# Patient Record
Sex: MALE | Race: WHITE | NOT HISPANIC OR LATINO | Employment: FULL TIME | URBAN - METROPOLITAN AREA
[De-identification: names, ages, dates, MRNs, and addresses within clinical notes are randomized per-mention and may not be internally consistent; named-entity substitution may affect disease eponyms.]

---

## 2017-06-06 ENCOUNTER — TREATMENT (OUTPATIENT)
Dept: PHYSICAL THERAPY | Facility: CLINIC | Age: 27
End: 2017-06-06

## 2017-06-06 DIAGNOSIS — Z02.1 PRE-EMPLOYMENT EXAMINATION: Primary | ICD-10-CM

## 2017-06-06 PROCEDURE — PDS: Performed by: PHYSICAL THERAPIST

## 2018-02-28 ENCOUNTER — LAB REQUISITION (OUTPATIENT)
Dept: LAB | Facility: OTHER | Age: 28
End: 2018-02-28

## 2018-02-28 DIAGNOSIS — Z00.00 ROUTINE GENERAL MEDICAL EXAMINATION AT A HEALTH CARE FACILITY: ICD-10-CM

## 2018-02-28 LAB
ALBUMIN SERPL-MCNC: 4.3 G/DL (ref 3.5–5.2)
ALP SERPL-CCNC: 46 U/L (ref 39–117)
ALT SERPL W P-5'-P-CCNC: 52 U/L (ref 1–41)
AST SERPL-CCNC: 26 U/L (ref 1–40)
BASOPHILS # BLD AUTO: 0.02 10*3/MM3 (ref 0–0.2)
BASOPHILS NFR BLD AUTO: 0.3 % (ref 0–1.5)
BILIRUB CONJ SERPL-MCNC: <0.2 MG/DL (ref 0–0.3)
BILIRUB SERPL-MCNC: 0.5 MG/DL (ref 0.1–1.2)
BILIRUB UR QL STRIP: NEGATIVE
BUN BLD-MCNC: 10 MG/DL (ref 6–20)
CALCIUM SPEC-SCNC: 9.6 MG/DL (ref 8.6–10.5)
CHLORIDE SERPL-SCNC: 102 MMOL/L (ref 98–107)
CHOLEST SERPL-MCNC: 199 MG/DL (ref 0–200)
CLARITY UR: CLEAR
CO2 SERPL-SCNC: 25.9 MMOL/L (ref 22–29)
COLOR UR: YELLOW
CREAT BLD-MCNC: 0.86 MG/DL (ref 0.76–1.27)
DEPRECATED RDW RBC AUTO: 46.3 FL (ref 37–54)
EOSINOPHIL # BLD AUTO: 0.23 10*3/MM3 (ref 0–0.7)
EOSINOPHIL NFR BLD AUTO: 3.3 % (ref 0.3–6.2)
ERYTHROCYTE [DISTWIDTH] IN BLOOD BY AUTOMATED COUNT: 15 % (ref 11.5–14.5)
GFR SERPL CREATININE-BSD FRML MDRD: 107 ML/MIN/1.73
GGT SERPL-CCNC: 22 U/L (ref 8–61)
GLUCOSE BLD-MCNC: 93 MG/DL (ref 65–99)
GLUCOSE UR STRIP-MCNC: NEGATIVE MG/DL
HCT VFR BLD AUTO: 43.3 % (ref 40.4–52.2)
HDLC SERPL-MCNC: 40 MG/DL (ref 40–60)
HGB BLD-MCNC: 14.6 G/DL (ref 13.7–17.6)
HGB UR QL STRIP.AUTO: NEGATIVE
IMM GRANULOCYTES # BLD: 0 10*3/MM3 (ref 0–0.03)
IMM GRANULOCYTES NFR BLD: 0 % (ref 0–0.5)
IRON 24H UR-MRATE: 66 MCG/DL (ref 59–158)
KETONES UR QL STRIP: NEGATIVE
LDH SERPL-CCNC: 229 U/L (ref 135–225)
LDLC SERPL CALC-MCNC: 131 MG/DL (ref 0–100)
LDLC/HDLC SERPL: 3.29 {RATIO}
LEUKOCYTE ESTERASE UR QL STRIP.AUTO: NEGATIVE
LYMPHOCYTES # BLD AUTO: 2.26 10*3/MM3 (ref 0.9–4.8)
LYMPHOCYTES NFR BLD AUTO: 32.4 % (ref 19.6–45.3)
MCH RBC QN AUTO: 28.8 PG (ref 27–32.7)
MCHC RBC AUTO-ENTMCNC: 33.7 G/DL (ref 32.6–36.4)
MCV RBC AUTO: 85.4 FL (ref 79.8–96.2)
MONOCYTES # BLD AUTO: 0.49 10*3/MM3 (ref 0.2–1.2)
MONOCYTES NFR BLD AUTO: 7 % (ref 5–12)
NEUTROPHILS # BLD AUTO: 3.97 10*3/MM3 (ref 1.9–8.1)
NEUTROPHILS NFR BLD AUTO: 57 % (ref 42.7–76)
NITRITE UR QL STRIP: NEGATIVE
PH UR STRIP.AUTO: 6 [PH] (ref 5–8)
PHOSPHATE SERPL-MCNC: 3.6 MG/DL (ref 2.5–4.5)
PLATELET # BLD AUTO: 210 10*3/MM3 (ref 140–500)
PMV BLD AUTO: 12.3 FL (ref 6–12)
POTASSIUM BLD-SCNC: 4.2 MMOL/L (ref 3.5–5.2)
PROT SERPL-MCNC: 7.8 G/DL (ref 6–8.5)
PROT UR QL STRIP: NEGATIVE
RBC # BLD AUTO: 5.07 10*6/MM3 (ref 4.6–6)
SODIUM BLD-SCNC: 140 MMOL/L (ref 136–145)
SP GR UR STRIP: 1.02 (ref 1–1.03)
TRIGL SERPL-MCNC: 138 MG/DL (ref 0–150)
URATE SERPL-MCNC: 9.1 MG/DL (ref 3.4–7)
UROBILINOGEN UR QL STRIP: NORMAL
VLDLC SERPL-MCNC: 27.6 MG/DL (ref 5–40)
WBC NRBC COR # BLD: 6.97 10*3/MM3 (ref 4.5–10.7)

## 2018-02-28 PROCEDURE — 80061 LIPID PANEL: CPT | Performed by: PHYSICAL MEDICINE & REHABILITATION

## 2018-02-28 PROCEDURE — 80053 COMPREHEN METABOLIC PANEL: CPT | Performed by: PHYSICAL MEDICINE & REHABILITATION

## 2018-02-28 PROCEDURE — 86481 TB AG RESPONSE T-CELL SUSP: CPT | Performed by: PHYSICAL MEDICINE & REHABILITATION

## 2018-02-28 PROCEDURE — 81003 URINALYSIS AUTO W/O SCOPE: CPT | Performed by: PHYSICAL MEDICINE & REHABILITATION

## 2018-02-28 PROCEDURE — 85025 COMPLETE CBC W/AUTO DIFF WBC: CPT | Performed by: PHYSICAL MEDICINE & REHABILITATION

## 2018-03-02 LAB
TSPOT INTERPRETATION: NEGATIVE
TSPOT NIL CONTROL: 0
TSPOT PANEL A: 0
TSPOT PANEL B: 0
TSPOT POS CONTROL: 84

## 2018-06-21 ENCOUNTER — LAB REQUISITION (OUTPATIENT)
Dept: LAB | Facility: OTHER | Age: 28
End: 2018-06-21

## 2018-06-21 DIAGNOSIS — Z00.00 ANNUAL PHYSICAL EXAM: ICD-10-CM

## 2018-06-21 LAB
ALBUMIN SERPL-MCNC: 4.4 G/DL (ref 3.5–5.2)
ALP SERPL-CCNC: 46 U/L (ref 39–117)
ALT SERPL W P-5'-P-CCNC: 44 U/L (ref 1–41)
AST SERPL-CCNC: 26 U/L (ref 1–40)
BASOPHILS # BLD AUTO: 0.02 10*3/MM3 (ref 0–0.2)
BASOPHILS NFR BLD AUTO: 0.3 % (ref 0–1.5)
BILIRUB CONJ SERPL-MCNC: <0.2 MG/DL (ref 0–0.3)
BILIRUB SERPL-MCNC: 0.4 MG/DL (ref 0.1–1.2)
BILIRUB UR QL STRIP: NEGATIVE
BUN BLD-MCNC: 15 MG/DL (ref 6–20)
CALCIUM SPEC-SCNC: 10 MG/DL (ref 8.6–10.5)
CHLORIDE SERPL-SCNC: 98 MMOL/L (ref 98–107)
CHOLEST SERPL-MCNC: 179 MG/DL (ref 0–200)
CLARITY UR: CLEAR
CO2 SERPL-SCNC: 26.3 MMOL/L (ref 22–29)
COLOR UR: YELLOW
CREAT BLD-MCNC: 0.89 MG/DL (ref 0.76–1.27)
DEPRECATED RDW RBC AUTO: 48.5 FL (ref 37–54)
EOSINOPHIL # BLD AUTO: 0.19 10*3/MM3 (ref 0–0.7)
EOSINOPHIL NFR BLD AUTO: 2.9 % (ref 0.3–6.2)
ERYTHROCYTE [DISTWIDTH] IN BLOOD BY AUTOMATED COUNT: 15.2 % (ref 11.5–14.5)
GFR SERPL CREATININE-BSD FRML MDRD: 103 ML/MIN/1.73
GGT SERPL-CCNC: 21 U/L (ref 8–61)
GLUCOSE BLD-MCNC: 97 MG/DL (ref 65–99)
GLUCOSE UR STRIP-MCNC: NEGATIVE MG/DL
HBV SURFACE AB SER RIA-ACNC: NORMAL
HCT VFR BLD AUTO: 41.8 % (ref 40.4–52.2)
HDLC SERPL-MCNC: 34 MG/DL (ref 40–60)
HGB BLD-MCNC: 13.7 G/DL (ref 13.7–17.6)
HGB UR QL STRIP.AUTO: NEGATIVE
IMM GRANULOCYTES # BLD: 0.01 10*3/MM3 (ref 0–0.03)
IMM GRANULOCYTES NFR BLD: 0.2 % (ref 0–0.5)
IRON 24H UR-MRATE: 60 MCG/DL (ref 59–158)
KETONES UR QL STRIP: NEGATIVE
LDH SERPL-CCNC: 267 U/L (ref 135–225)
LDLC SERPL CALC-MCNC: 123 MG/DL (ref 0–100)
LDLC/HDLC SERPL: 3.63 {RATIO}
LEUKOCYTE ESTERASE UR QL STRIP.AUTO: NEGATIVE
LYMPHOCYTES # BLD AUTO: 2.11 10*3/MM3 (ref 0.9–4.8)
LYMPHOCYTES NFR BLD AUTO: 31.9 % (ref 19.6–45.3)
MCH RBC QN AUTO: 28.5 PG (ref 27–32.7)
MCHC RBC AUTO-ENTMCNC: 32.8 G/DL (ref 32.6–36.4)
MCV RBC AUTO: 87.1 FL (ref 79.8–96.2)
MONOCYTES # BLD AUTO: 0.65 10*3/MM3 (ref 0.2–1.2)
MONOCYTES NFR BLD AUTO: 9.8 % (ref 5–12)
NEUTROPHILS # BLD AUTO: 3.64 10*3/MM3 (ref 1.9–8.1)
NEUTROPHILS NFR BLD AUTO: 55.1 % (ref 42.7–76)
NITRITE UR QL STRIP: NEGATIVE
PH UR STRIP.AUTO: 5.5 [PH] (ref 5–8)
PHOSPHATE SERPL-MCNC: 3.8 MG/DL (ref 2.5–4.5)
PLATELET # BLD AUTO: 207 10*3/MM3 (ref 140–500)
PMV BLD AUTO: 12.1 FL (ref 6–12)
POTASSIUM BLD-SCNC: 4.3 MMOL/L (ref 3.5–5.2)
PROT SERPL-MCNC: 7.7 G/DL (ref 6–8.5)
PROT UR QL STRIP: NEGATIVE
RBC # BLD AUTO: 4.8 10*6/MM3 (ref 4.6–6)
SODIUM BLD-SCNC: 138 MMOL/L (ref 136–145)
SP GR UR STRIP: 1.03 (ref 1–1.03)
TRIGL SERPL-MCNC: 108 MG/DL (ref 0–150)
URATE SERPL-MCNC: 8.8 MG/DL (ref 3.4–7)
UROBILINOGEN UR QL STRIP: NORMAL
VLDLC SERPL-MCNC: 21.6 MG/DL (ref 5–40)
WBC NRBC COR # BLD: 6.61 10*3/MM3 (ref 4.5–10.7)

## 2018-06-21 PROCEDURE — 85025 COMPLETE CBC W/AUTO DIFF WBC: CPT | Performed by: PHYSICAL MEDICINE & REHABILITATION

## 2018-06-21 PROCEDURE — 86706 HEP B SURFACE ANTIBODY: CPT | Performed by: PHYSICAL MEDICINE & REHABILITATION

## 2018-06-21 PROCEDURE — 80053 COMPREHEN METABOLIC PANEL: CPT | Performed by: PHYSICAL MEDICINE & REHABILITATION

## 2018-06-21 PROCEDURE — 81003 URINALYSIS AUTO W/O SCOPE: CPT | Performed by: PHYSICAL MEDICINE & REHABILITATION

## 2018-06-21 PROCEDURE — 86481 TB AG RESPONSE T-CELL SUSP: CPT | Performed by: PHYSICAL MEDICINE & REHABILITATION

## 2018-06-21 PROCEDURE — 80061 LIPID PANEL: CPT | Performed by: PHYSICAL MEDICINE & REHABILITATION

## 2018-06-23 LAB
TSPOT INTERPRETATION: NEGATIVE
TSPOT NIL CONTROL INTERPRETATION: NORMAL
TSPOT PANEL A: 0
TSPOT PANEL B: 0
TSPOT POS CONTROL INTERPRETATION: NORMAL

## 2019-03-06 ENCOUNTER — TRANSCRIBE ORDERS (OUTPATIENT)
Dept: CARDIOLOGY | Facility: CLINIC | Age: 29
End: 2019-03-06

## 2019-03-06 ENCOUNTER — LAB REQUISITION (OUTPATIENT)
Dept: LAB | Facility: OTHER | Age: 29
End: 2019-03-06

## 2019-03-06 DIAGNOSIS — Z00.00 PHYSICAL EXAM: Primary | ICD-10-CM

## 2019-03-06 DIAGNOSIS — Z00.00 ROUTINE GENERAL MEDICAL EXAMINATION AT A HEALTH CARE FACILITY: ICD-10-CM

## 2019-03-06 LAB
ALBUMIN SERPL-MCNC: 4.3 G/DL (ref 3.5–5.2)
ALP SERPL-CCNC: 43 U/L (ref 39–117)
ALT SERPL W P-5'-P-CCNC: 69 U/L (ref 1–41)
AST SERPL-CCNC: 27 U/L (ref 1–40)
BASOPHILS # BLD AUTO: 0.03 10*3/MM3 (ref 0–0.2)
BASOPHILS NFR BLD AUTO: 0.5 % (ref 0–1.5)
BILIRUB CONJ SERPL-MCNC: <0.2 MG/DL (ref 0–0.3)
BILIRUB SERPL-MCNC: 0.5 MG/DL (ref 0.1–1.2)
BILIRUB UR QL STRIP: NEGATIVE
BUN BLD-MCNC: 13 MG/DL (ref 6–20)
CALCIUM SPEC-SCNC: 9.9 MG/DL (ref 8.6–10.5)
CHLORIDE SERPL-SCNC: 102 MMOL/L (ref 98–107)
CHOLEST SERPL-MCNC: 190 MG/DL (ref 0–200)
CLARITY UR: CLEAR
CO2 SERPL-SCNC: 24.2 MMOL/L (ref 22–29)
COLOR UR: YELLOW
CREAT BLD-MCNC: 0.88 MG/DL (ref 0.76–1.27)
DEPRECATED RDW RBC AUTO: 44.5 FL (ref 37–54)
EOSINOPHIL # BLD AUTO: 0.17 10*3/MM3 (ref 0–0.4)
EOSINOPHIL NFR BLD AUTO: 2.7 % (ref 0.3–6.2)
ERYTHROCYTE [DISTWIDTH] IN BLOOD BY AUTOMATED COUNT: 14.5 % (ref 12.3–15.4)
GFR SERPL CREATININE-BSD FRML MDRD: 103 ML/MIN/1.73
GGT SERPL-CCNC: 26 U/L (ref 8–61)
GLUCOSE BLD-MCNC: 90 MG/DL (ref 65–99)
GLUCOSE UR STRIP-MCNC: NEGATIVE MG/DL
HCT VFR BLD AUTO: 45 % (ref 37.5–51)
HDLC SERPL-MCNC: 37 MG/DL (ref 40–60)
HGB BLD-MCNC: 14.4 G/DL (ref 13–17.7)
HGB UR QL STRIP.AUTO: NEGATIVE
IMM GRANULOCYTES # BLD AUTO: 0.03 10*3/MM3 (ref 0–0.05)
IMM GRANULOCYTES NFR BLD AUTO: 0.5 % (ref 0–0.5)
IRON 24H UR-MRATE: 59 MCG/DL (ref 59–158)
KETONES UR QL STRIP: NEGATIVE
LDH SERPL-CCNC: 245 U/L (ref 135–225)
LDLC SERPL CALC-MCNC: 135 MG/DL (ref 0–100)
LDLC/HDLC SERPL: 3.66 {RATIO}
LEUKOCYTE ESTERASE UR QL STRIP.AUTO: NEGATIVE
LYMPHOCYTES # BLD AUTO: 1.83 10*3/MM3 (ref 0.7–3.1)
LYMPHOCYTES NFR BLD AUTO: 29 % (ref 19.6–45.3)
MCH RBC QN AUTO: 27.3 PG (ref 26.6–33)
MCHC RBC AUTO-ENTMCNC: 32 G/DL (ref 31.5–35.7)
MCV RBC AUTO: 85.2 FL (ref 79–97)
MONOCYTES # BLD AUTO: 0.7 10*3/MM3 (ref 0.1–0.9)
MONOCYTES NFR BLD AUTO: 11.1 % (ref 5–12)
NEUTROPHILS # BLD AUTO: 3.55 10*3/MM3 (ref 1.4–7)
NEUTROPHILS NFR BLD AUTO: 56.2 % (ref 42.7–76)
NITRITE UR QL STRIP: NEGATIVE
NRBC BLD AUTO-RTO: 0 /100 WBC (ref 0–0)
PH UR STRIP.AUTO: 6 [PH] (ref 5–8)
PHOSPHATE SERPL-MCNC: 3.4 MG/DL (ref 2.5–4.5)
PLATELET # BLD AUTO: 194 10*3/MM3 (ref 140–450)
PMV BLD AUTO: 12.2 FL (ref 6–12)
POTASSIUM BLD-SCNC: 4.1 MMOL/L (ref 3.5–5.2)
PROT SERPL-MCNC: 7.1 G/DL (ref 6–8.5)
PROT UR QL STRIP: NEGATIVE
RBC # BLD AUTO: 5.28 10*6/MM3 (ref 4.14–5.8)
SODIUM BLD-SCNC: 139 MMOL/L (ref 136–145)
SP GR UR STRIP: 1.02 (ref 1–1.03)
TRIGL SERPL-MCNC: 88 MG/DL (ref 0–150)
URATE SERPL-MCNC: 9.4 MG/DL (ref 3.4–7)
UROBILINOGEN UR QL STRIP: NORMAL
VLDLC SERPL-MCNC: 17.6 MG/DL (ref 5–40)
WBC NRBC COR # BLD: 6.31 10*3/MM3 (ref 3.4–10.8)

## 2019-03-06 PROCEDURE — 80053 COMPREHEN METABOLIC PANEL: CPT | Performed by: PHYSICAL MEDICINE & REHABILITATION

## 2019-03-06 PROCEDURE — 81003 URINALYSIS AUTO W/O SCOPE: CPT | Performed by: PHYSICAL MEDICINE & REHABILITATION

## 2019-03-06 PROCEDURE — 85025 COMPLETE CBC W/AUTO DIFF WBC: CPT | Performed by: PHYSICAL MEDICINE & REHABILITATION

## 2019-03-06 PROCEDURE — 86481 TB AG RESPONSE T-CELL SUSP: CPT | Performed by: PHYSICAL MEDICINE & REHABILITATION

## 2019-03-06 PROCEDURE — 80061 LIPID PANEL: CPT | Performed by: PHYSICAL MEDICINE & REHABILITATION

## 2019-03-13 ENCOUNTER — HOSPITAL ENCOUNTER (OUTPATIENT)
Dept: CARDIOLOGY | Facility: HOSPITAL | Age: 29
Discharge: HOME | End: 2019-03-13

## 2019-03-13 DIAGNOSIS — Z00.00 PHYSICAL EXAM: ICD-10-CM

## 2019-03-13 LAB
BH CV STRESS BP STAGE 1: NORMAL
BH CV STRESS BP STAGE 2: NORMAL
BH CV STRESS BP STAGE 3: NORMAL
BH CV STRESS DURATION MIN STAGE 1: 3
BH CV STRESS DURATION MIN STAGE 2: 3
BH CV STRESS DURATION MIN STAGE 3: 3
BH CV STRESS DURATION SEC STAGE 1: 0
BH CV STRESS DURATION SEC STAGE 2: 0
BH CV STRESS DURATION SEC STAGE 3: 0
BH CV STRESS GRADE STAGE 1: 10
BH CV STRESS GRADE STAGE 2: 12
BH CV STRESS GRADE STAGE 3: 14
BH CV STRESS HR STAGE 1: 119
BH CV STRESS HR STAGE 2: 158
BH CV STRESS HR STAGE 3: 188
BH CV STRESS METS STAGE 1: 5
BH CV STRESS METS STAGE 2: 7.5
BH CV STRESS METS STAGE 3: 10
BH CV STRESS PROTOCOL 1: NORMAL
BH CV STRESS RECOVERY BP: NORMAL MMHG
BH CV STRESS RECOVERY HR: 98 BPM
BH CV STRESS SPEED STAGE 1: 1.7
BH CV STRESS SPEED STAGE 2: 2.5
BH CV STRESS SPEED STAGE 3: 3.4
BH CV STRESS STAGE 1: 1
BH CV STRESS STAGE 2: 2
BH CV STRESS STAGE 3: 3
MAXIMAL PREDICTED HEART RATE: 192 BPM
PERCENT MAX PREDICTED HR: 97.92 %
STRESS BASELINE BP: NORMAL MMHG
STRESS BASELINE HR: 88 BPM
STRESS PERCENT HR: 115 %
STRESS POST ESTIMATED WORKLOAD: 10 METS
STRESS POST EXERCISE DUR MIN: 9 MIN
STRESS POST EXERCISE DUR SEC: 0 SEC
STRESS POST PEAK BP: NORMAL MMHG
STRESS POST PEAK HR: 188 BPM
STRESS TARGET HR: 163 BPM

## 2019-03-13 PROCEDURE — 93017 CV STRESS TEST TRACING ONLY: CPT

## 2019-03-13 PROCEDURE — 93018 CV STRESS TEST I&R ONLY: CPT | Performed by: INTERNAL MEDICINE

## 2019-03-13 PROCEDURE — 93016 CV STRESS TEST SUPVJ ONLY: CPT | Performed by: INTERNAL MEDICINE

## 2020-02-03 ENCOUNTER — TELEPHONE (OUTPATIENT)
Dept: NEUROSURGERY | Facility: CLINIC | Age: 30
End: 2020-02-03

## 2020-02-03 NOTE — TELEPHONE ENCOUNTER
LM for patient to call back regarding referral for NP appt. This is a work comp injury so we need more information and a written approval from  not just a referral from Dr. Napoles.

## 2020-03-27 ENCOUNTER — LAB REQUISITION (OUTPATIENT)
Dept: LAB | Facility: OTHER | Age: 30
End: 2020-03-27

## 2020-03-27 DIAGNOSIS — Z00.00 ROUTINE GENERAL MEDICAL EXAMINATION AT A HEALTH CARE FACILITY: ICD-10-CM

## 2020-03-27 LAB
ALBUMIN SERPL-MCNC: 4.3 G/DL (ref 3.5–5.2)
ALBUMIN/GLOB SERPL: 1.4 G/DL
ALP SERPL-CCNC: 58 U/L (ref 39–117)
ALT SERPL W P-5'-P-CCNC: 49 U/L (ref 1–41)
AST SERPL-CCNC: 23 U/L (ref 1–40)
BASOPHILS # BLD AUTO: 0.04 10*3/MM3 (ref 0–0.2)
BASOPHILS NFR BLD AUTO: 0.6 % (ref 0–1.5)
BILIRUB CONJ SERPL-MCNC: <0.2 MG/DL (ref 0.2–0.3)
BILIRUB SERPL-MCNC: 0.4 MG/DL (ref 0.2–1.2)
BILIRUB UR QL STRIP: NEGATIVE
BUN BLD-MCNC: 9 MG/DL (ref 6–20)
CALCIUM SPEC-SCNC: 9.4 MG/DL (ref 8.6–10.5)
CHLORIDE SERPL-SCNC: 106 MMOL/L (ref 98–107)
CHOLEST SERPL-MCNC: 184 MG/DL (ref 0–200)
CLARITY UR: CLEAR
CO2 SERPL-SCNC: 24.4 MMOL/L (ref 22–29)
COLOR UR: YELLOW
CREAT BLD-MCNC: 0.89 MG/DL (ref 0.76–1.27)
DEPRECATED RDW RBC AUTO: 43.8 FL (ref 37–54)
EOSINOPHIL # BLD AUTO: 0.25 10*3/MM3 (ref 0–0.4)
EOSINOPHIL NFR BLD AUTO: 3.6 % (ref 0.3–6.2)
ERYTHROCYTE [DISTWIDTH] IN BLOOD BY AUTOMATED COUNT: 14.4 % (ref 12.3–15.4)
GFR SERPL CREATININE-BSD FRML MDRD: 101 ML/MIN/1.73
GGT SERPL-CCNC: 26 U/L (ref 8–61)
GLOBULIN UR ELPH-MCNC: 3 GM/DL
GLUCOSE BLD-MCNC: 96 MG/DL (ref 65–99)
GLUCOSE UR STRIP-MCNC: NEGATIVE MG/DL
HCT VFR BLD AUTO: 42.2 % (ref 37.5–51)
HDLC SERPL-MCNC: 40 MG/DL (ref 40–60)
HGB BLD-MCNC: 14.1 G/DL (ref 13–17.7)
HGB UR QL STRIP.AUTO: NEGATIVE
IMM GRANULOCYTES # BLD AUTO: 0.02 10*3/MM3 (ref 0–0.05)
IMM GRANULOCYTES NFR BLD AUTO: 0.3 % (ref 0–0.5)
IRON 24H UR-MRATE: 70 MCG/DL (ref 59–158)
KETONES UR QL STRIP: NEGATIVE
LDH SERPL-CCNC: 287 U/L (ref 135–225)
LDLC SERPL CALC-MCNC: 127 MG/DL (ref 0–100)
LDLC/HDLC SERPL: 3.19 {RATIO}
LEUKOCYTE ESTERASE UR QL STRIP.AUTO: NEGATIVE
LYMPHOCYTES # BLD AUTO: 2.51 10*3/MM3 (ref 0.7–3.1)
LYMPHOCYTES NFR BLD AUTO: 36 % (ref 19.6–45.3)
MCH RBC QN AUTO: 28.1 PG (ref 26.6–33)
MCHC RBC AUTO-ENTMCNC: 33.4 G/DL (ref 31.5–35.7)
MCV RBC AUTO: 84.1 FL (ref 79–97)
MONOCYTES # BLD AUTO: 0.69 10*3/MM3 (ref 0.1–0.9)
MONOCYTES NFR BLD AUTO: 9.9 % (ref 5–12)
NEUTROPHILS # BLD AUTO: 3.47 10*3/MM3 (ref 1.7–7)
NEUTROPHILS NFR BLD AUTO: 49.6 % (ref 42.7–76)
NITRITE UR QL STRIP: NEGATIVE
NRBC BLD AUTO-RTO: 0 /100 WBC (ref 0–0.2)
PH UR STRIP.AUTO: 5.5 [PH] (ref 5–8)
PHOSPHATE SERPL-MCNC: 3.5 MG/DL (ref 2.5–4.5)
PLATELET # BLD AUTO: 231 10*3/MM3 (ref 140–450)
PMV BLD AUTO: 11.7 FL (ref 6–12)
POTASSIUM BLD-SCNC: 4.6 MMOL/L (ref 3.5–5.2)
PROT SERPL-MCNC: 7.3 G/DL (ref 6–8.5)
PROT UR QL STRIP: NEGATIVE
RBC # BLD AUTO: 5.02 10*6/MM3 (ref 4.14–5.8)
SODIUM BLD-SCNC: 142 MMOL/L (ref 136–145)
SP GR UR STRIP: 1.02 (ref 1–1.03)
TRIGL SERPL-MCNC: 83 MG/DL (ref 0–150)
URATE SERPL-MCNC: 9.6 MG/DL (ref 3.4–7)
UROBILINOGEN UR QL STRIP: NORMAL
VLDLC SERPL-MCNC: 16.6 MG/DL (ref 5–40)
WBC NRBC COR # BLD: 6.98 10*3/MM3 (ref 3.4–10.8)

## 2020-03-27 PROCEDURE — 86481 TB AG RESPONSE T-CELL SUSP: CPT | Performed by: PHYSICAL MEDICINE & REHABILITATION

## 2020-03-27 PROCEDURE — 81003 URINALYSIS AUTO W/O SCOPE: CPT | Performed by: PHYSICAL MEDICINE & REHABILITATION

## 2020-03-27 PROCEDURE — 80061 LIPID PANEL: CPT | Performed by: PHYSICAL MEDICINE & REHABILITATION

## 2020-03-27 PROCEDURE — 85025 COMPLETE CBC W/AUTO DIFF WBC: CPT | Performed by: PHYSICAL MEDICINE & REHABILITATION

## 2020-03-27 PROCEDURE — 80053 COMPREHEN METABOLIC PANEL: CPT | Performed by: PHYSICAL MEDICINE & REHABILITATION

## 2020-07-02 ENCOUNTER — OFFICE VISIT (OUTPATIENT)
Dept: FAMILY MEDICINE CLINIC | Facility: CLINIC | Age: 30
End: 2020-07-02

## 2020-07-02 VITALS
HEIGHT: 75 IN | OXYGEN SATURATION: 96 % | TEMPERATURE: 98.4 F | WEIGHT: 315 LBS | DIASTOLIC BLOOD PRESSURE: 100 MMHG | HEART RATE: 63 BPM | BODY MASS INDEX: 39.17 KG/M2 | SYSTOLIC BLOOD PRESSURE: 148 MMHG

## 2020-07-02 DIAGNOSIS — E66.01 CLASS 3 SEVERE OBESITY DUE TO EXCESS CALORIES WITH SERIOUS COMORBIDITY AND BODY MASS INDEX (BMI) OF 45.0 TO 49.9 IN ADULT (HCC): ICD-10-CM

## 2020-07-02 DIAGNOSIS — Z11.59 NEED FOR HEPATITIS C SCREENING TEST: ICD-10-CM

## 2020-07-02 DIAGNOSIS — I10 ESSENTIAL HYPERTENSION: Primary | ICD-10-CM

## 2020-07-02 PROBLEM — G89.29 CHRONIC FOOT PAIN, LEFT: Status: ACTIVE | Noted: 2017-10-04

## 2020-07-02 PROBLEM — M79.672 CHRONIC FOOT PAIN, LEFT: Status: ACTIVE | Noted: 2017-10-04

## 2020-07-02 PROBLEM — E66.813 CLASS 3 SEVERE OBESITY DUE TO EXCESS CALORIES WITH SERIOUS COMORBIDITY AND BODY MASS INDEX (BMI) OF 45.0 TO 49.9 IN ADULT: Status: ACTIVE | Noted: 2020-07-02

## 2020-07-02 PROBLEM — M67.00 ACHILLES TENDON CONTRACTURE DUE TO NON-NEUROLOGIC CAUSE: Status: ACTIVE | Noted: 2017-10-04

## 2020-07-02 PROCEDURE — 99203 OFFICE O/P NEW LOW 30 MIN: CPT | Performed by: NURSE PRACTITIONER

## 2020-07-02 RX ORDER — LISINOPRIL 10 MG/1
10 TABLET ORAL DAILY
COMMUNITY
End: 2020-09-28

## 2020-07-02 RX ORDER — AMLODIPINE BESYLATE 10 MG/1
10 TABLET ORAL DAILY
Qty: 30 TABLET | Refills: 0 | Status: SHIPPED | OUTPATIENT
Start: 2020-07-02 | End: 2020-08-06

## 2020-07-02 NOTE — PROGRESS NOTES
"Beau Arias is a 29 y.o. male. Patient presents today to establish care and address hypertension.     History of Present Illness   Has been off BP medication for a few months. Last saw Dr. Napoles in Feb. Over a WC back injury. Works with [employer] . 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 On [employer]. Back is better. Still some brief residual tingling and numbness. Lisinopril 10 would keep diastolic in 90s, and systolic in 140s. Never had cough, but had some intimacy issues.   Has tried multiple times to lose weight--has been to weight loss clinic, tried topiramate. Has constant hunger. Lowest 360. Has decreased meal size, cut back cokes, but always hungry. Does snore, has CPAP, has restarted treating in the last week.  The following portions of the patient's history were reviewed and updated as appropriate: allergies, current medications, past family history, past medical history, past social history, past surgical history and problem list.    Review of Systems   Constitutional: Negative for activity change, appetite change, fatigue, unexpected weight gain and unexpected weight loss.   HENT: Negative.    Eyes: Negative.    Respiratory: Negative.  Negative for shortness of breath.    Cardiovascular: Negative.  Negative for chest pain, palpitations and leg swelling.   Gastrointestinal: Negative.    Neurological: Negative for headache.   Hematological: Negative for adenopathy.   Psychiatric/Behavioral: Negative.      /100 (BP Location: Right arm, Patient Position: Sitting, Cuff Size: Adult)   Pulse 63   Temp 98.4 °F (36.9 °C) (Oral)   Ht 190.5 cm (75\")   Wt (!) 175 kg (386 lb)   SpO2 96%   BMI 48.25 kg/m²     Objective   Physical Exam   Constitutional: He is oriented to person, place, and time. He appears well-developed and well-nourished. No distress.   Neck: Normal range of motion. Neck supple. No tracheal deviation present. No thyromegaly present.   Cardiovascular: Normal rate, regular rhythm " and normal heart sounds.   Pulses:       Carotid pulses are 2+ on the right side, and 2+ on the left side.  Pulmonary/Chest: Effort normal and breath sounds normal.   Lymphadenopathy:     He has no cervical adenopathy.   Neurological: He is alert and oriented to person, place, and time.   Skin: Skin is warm and dry.   Psychiatric: He has a normal mood and affect. His behavior is normal. Judgment and thought content normal.   Nursing note and vitals reviewed.    Assessment/Plan   Problems Addressed this Visit        Cardiovascular and Mediastinum    Essential hypertension - Primary    Relevant Medications    lisinopril (PRINIVIL,ZESTRIL) 10 MG tablet    amLODIPine (NORVASC) 10 MG tablet       Other    Class 3 severe obesity due to excess calories with serious comorbidity and body mass index (BMI) of 45.0 to 49.9 in adult (CMS/[facility])    Relevant Orders    TSH      Other Visit Diagnoses     Need for hepatitis C screening test        Relevant Orders    Hepatitis C Antibody        HTN--rotate to amlodipine secondary to sexual side effects goal <135/85  Obesity--recommend consistent carb diet and continued exercise. Consider phentermine if BP better controlled. Is  A  and can check at his work. Would FU 1 month if starts.  Screen for Hep C based on population recommendations  Lipid and glucose screening through work physicals--mild LDL elevation

## 2020-07-03 LAB
HCV AB S/CO SERPL IA: <0.1 S/CO RATIO (ref 0–0.9)
TSH SERPL DL<=0.005 MIU/L-ACNC: 4.93 UIU/ML (ref 0.27–4.2)

## 2020-08-05 ENCOUNTER — TELEPHONE (OUTPATIENT)
Dept: FAMILY MEDICINE CLINIC | Facility: CLINIC | Age: 30
End: 2020-08-05

## 2020-08-05 RX ORDER — HYDROCHLOROTHIAZIDE 25 MG/1
25 TABLET ORAL DAILY
Qty: 30 TABLET | Refills: 0 | Status: SHIPPED | OUTPATIENT
Start: 2020-08-05 | End: 2020-09-21

## 2020-08-05 NOTE — TELEPHONE ENCOUNTER
That is max on amlodipine, would add HCTZ 25 mg daily.#30 0rf--start on off shift day, as urination typically increased for 1-2 days only

## 2020-08-06 RX ORDER — AMLODIPINE BESYLATE 10 MG/1
TABLET ORAL
Qty: 30 TABLET | Refills: 2 | Status: SHIPPED | OUTPATIENT
Start: 2020-08-06 | End: 2020-09-28

## 2020-09-21 RX ORDER — HYDROCHLOROTHIAZIDE 25 MG/1
TABLET ORAL
Qty: 30 TABLET | Refills: 0 | Status: SHIPPED | OUTPATIENT
Start: 2020-09-21 | End: 2020-12-04

## 2020-09-28 ENCOUNTER — OFFICE VISIT (OUTPATIENT)
Dept: FAMILY MEDICINE CLINIC | Facility: CLINIC | Age: 30
End: 2020-09-28

## 2020-09-28 DIAGNOSIS — I10 ESSENTIAL HYPERTENSION: Primary | ICD-10-CM

## 2020-09-28 PROCEDURE — 99442 PR PHYS/QHP TELEPHONE EVALUATION 11-20 MIN: CPT | Performed by: NURSE PRACTITIONER

## 2020-09-28 RX ORDER — METOPROLOL SUCCINATE 50 MG/1
50 TABLET, EXTENDED RELEASE ORAL DAILY
Qty: 30 TABLET | Refills: 3 | Status: SHIPPED | OUTPATIENT
Start: 2020-09-28 | End: 2021-01-05 | Stop reason: SDUPTHER

## 2020-09-28 NOTE — PROGRESS NOTES
Subjective   Roscoe Arias is a 30 y.o. male. FU BP     History of Present Illness   BP still 140/100 and has developed swelling in feet and legs. Hard to get shoes on. Calves do not hurt.   Switched off lisinopril secondary to sexual side effects. HCTZ added. This has not lessened swelling. Has no asthma history, is not a smokier.   The following portions of the patient's history were reviewed and updated as appropriate: allergies, current medications, past family history, past medical history, past social history, past surgical history and problem list.    Review of Systems   Constitutional: Negative for activity change, appetite change, fatigue, unexpected weight gain and unexpected weight loss.   Respiratory: Negative.    Cardiovascular: Positive for leg swelling. Negative for chest pain and palpitations.   Musculoskeletal: Negative.    Hematological: Negative.    Psychiatric/Behavioral: Negative.      There were no vitals taken for this visit.    Objective   Physical Exam  Constitutional:       General: He is not in acute distress.     Appearance: He is well-developed. He is not diaphoretic.   Pulmonary:      Effort: Pulmonary effort is normal.   Musculoskeletal:         General: No tenderness (calves non tender).   Neurological:      Mental Status: He is alert and oriented to person, place, and time.   Psychiatric:         Behavior: Behavior normal.         Thought Content: Thought content normal.         Judgment: Judgment normal.       Assessment/Plan   Problems Addressed this Visit        Cardiovascular and Mediastinum    Essential hypertension - Primary    Relevant Medications    metoprolol succinate XL (Toprol XL) 50 MG 24 hr tablet      Diagnoses       Codes Comments    Essential hypertension    -  Primary ICD-10-CM: I10  ICD-9-CM: 401.9       will continue HCTZ and rotate to metoprolol. FU 1 month if BP not to goal <135/85    You have chosen to receive care through a telephone visit. Do you consent to  use a telephone visit for your medical care today? Yes    11 min EM

## 2020-12-04 RX ORDER — HYDROCHLOROTHIAZIDE 25 MG/1
TABLET ORAL
Qty: 90 TABLET | Refills: 1 | Status: SHIPPED | OUTPATIENT
Start: 2020-12-04 | End: 2021-01-05

## 2020-12-15 ENCOUNTER — LAB REQUISITION (OUTPATIENT)
Dept: LAB | Facility: OTHER | Age: 30
End: 2020-12-15

## 2020-12-15 DIAGNOSIS — Z00.00 ANNUAL PHYSICAL EXAM: ICD-10-CM

## 2020-12-15 LAB
ALBUMIN SERPL-MCNC: 4.1 G/DL (ref 3.5–5.2)
ALBUMIN/GLOB SERPL: 1.6 G/DL
ALP SERPL-CCNC: 59 U/L (ref 39–117)
ALT SERPL W P-5'-P-CCNC: 51 U/L (ref 1–41)
AST SERPL-CCNC: 29 U/L (ref 1–40)
BASOPHILS # BLD AUTO: 0.04 10*3/MM3 (ref 0–0.2)
BASOPHILS NFR BLD AUTO: 0.6 % (ref 0–1.5)
BILIRUB CONJ SERPL-MCNC: <0.2 MG/DL (ref 0–0.3)
BILIRUB SERPL-MCNC: 0.4 MG/DL (ref 0–1.2)
BILIRUB UR QL STRIP: NEGATIVE
BUN SERPL-MCNC: 12 MG/DL (ref 6–20)
CALCIUM SPEC-SCNC: 8.9 MG/DL (ref 8.6–10.5)
CHLORIDE SERPL-SCNC: 105 MMOL/L (ref 98–107)
CHOLEST SERPL-MCNC: 195 MG/DL (ref 0–200)
CLARITY UR: CLEAR
CO2 SERPL-SCNC: 23 MMOL/L (ref 22–29)
COLOR UR: YELLOW
CREAT SERPL-MCNC: 0.84 MG/DL (ref 0.76–1.27)
DEPRECATED RDW RBC AUTO: 45.4 FL (ref 37–54)
EOSINOPHIL # BLD AUTO: 0.24 10*3/MM3 (ref 0–0.4)
EOSINOPHIL NFR BLD AUTO: 3.5 % (ref 0.3–6.2)
ERYTHROCYTE [DISTWIDTH] IN BLOOD BY AUTOMATED COUNT: 15 % (ref 12.3–15.4)
GFR SERPL CREATININE-BSD FRML MDRD: 107 ML/MIN/1.73
GGT SERPL-CCNC: 27 U/L (ref 8–61)
GLOBULIN UR ELPH-MCNC: 2.6 GM/DL
GLUCOSE SERPL-MCNC: 127 MG/DL (ref 65–99)
GLUCOSE UR STRIP-MCNC: NEGATIVE MG/DL
HCT VFR BLD AUTO: 42.4 % (ref 37.5–51)
HDLC SERPL-MCNC: 35 MG/DL (ref 40–60)
HGB BLD-MCNC: 14 G/DL (ref 13–17.7)
HGB UR QL STRIP.AUTO: NEGATIVE
HIV1+2 AB SER QL: NORMAL
IMM GRANULOCYTES # BLD AUTO: 0.02 10*3/MM3 (ref 0–0.05)
IMM GRANULOCYTES NFR BLD AUTO: 0.3 % (ref 0–0.5)
IRON 24H UR-MRATE: 64 MCG/DL (ref 59–158)
KETONES UR QL STRIP: NEGATIVE
LDH SERPL-CCNC: 209 U/L (ref 135–225)
LDLC SERPL CALC-MCNC: 131 MG/DL (ref 0–100)
LDLC/HDLC SERPL: 3.66 {RATIO}
LEUKOCYTE ESTERASE UR QL STRIP.AUTO: NEGATIVE
LYMPHOCYTES # BLD AUTO: 2.38 10*3/MM3 (ref 0.7–3.1)
LYMPHOCYTES NFR BLD AUTO: 34.8 % (ref 19.6–45.3)
MCH RBC QN AUTO: 27.6 PG (ref 26.6–33)
MCHC RBC AUTO-ENTMCNC: 33 G/DL (ref 31.5–35.7)
MCV RBC AUTO: 83.5 FL (ref 79–97)
MEV IGG SER IA-ACNC: NEGATIVE
MONOCYTES # BLD AUTO: 0.61 10*3/MM3 (ref 0.1–0.9)
MONOCYTES NFR BLD AUTO: 8.9 % (ref 5–12)
MUV IGG SER IA-ACNC: POSITIVE
NEUTROPHILS NFR BLD AUTO: 3.55 10*3/MM3 (ref 1.7–7)
NEUTROPHILS NFR BLD AUTO: 51.9 % (ref 42.7–76)
NITRITE UR QL STRIP: NEGATIVE
NRBC BLD AUTO-RTO: 0 /100 WBC (ref 0–0.2)
PH UR STRIP.AUTO: 6.5 [PH] (ref 5–8)
PHOSPHATE SERPL-MCNC: 2.8 MG/DL (ref 2.5–4.5)
PLATELET # BLD AUTO: 199 10*3/MM3 (ref 140–450)
PMV BLD AUTO: 11.6 FL (ref 6–12)
POTASSIUM SERPL-SCNC: 3.9 MMOL/L (ref 3.5–5.2)
PROT SERPL-MCNC: 6.7 G/DL (ref 6–8.5)
PROT UR QL STRIP: NEGATIVE
RBC # BLD AUTO: 5.08 10*6/MM3 (ref 4.14–5.8)
RUBV IGG SERPL IA-ACNC: POSITIVE
SODIUM SERPL-SCNC: 137 MMOL/L (ref 136–145)
SP GR UR STRIP: 1.02 (ref 1–1.03)
TRIGL SERPL-MCNC: 160 MG/DL (ref 0–150)
URATE SERPL-MCNC: 9.1 MG/DL (ref 3.4–7)
UROBILINOGEN UR QL STRIP: NORMAL
VLDLC SERPL-MCNC: 29 MG/DL (ref 5–40)
VZV IGG SER IA-ACNC: POSITIVE
WBC # BLD AUTO: 6.84 10*3/MM3 (ref 3.4–10.8)

## 2020-12-15 PROCEDURE — 86735 MUMPS ANTIBODY: CPT | Performed by: PHYSICAL MEDICINE & REHABILITATION

## 2020-12-15 PROCEDURE — 80053 COMPREHEN METABOLIC PANEL: CPT | Performed by: PHYSICAL MEDICINE & REHABILITATION

## 2020-12-15 PROCEDURE — 85025 COMPLETE CBC W/AUTO DIFF WBC: CPT | Performed by: PHYSICAL MEDICINE & REHABILITATION

## 2020-12-15 PROCEDURE — 86481 TB AG RESPONSE T-CELL SUSP: CPT | Performed by: PHYSICAL MEDICINE & REHABILITATION

## 2020-12-15 PROCEDURE — G0432 EIA HIV-1/HIV-2 SCREEN: HCPCS | Performed by: PHYSICAL MEDICINE & REHABILITATION

## 2020-12-15 PROCEDURE — 86787 VARICELLA-ZOSTER ANTIBODY: CPT | Performed by: PHYSICAL MEDICINE & REHABILITATION

## 2020-12-15 PROCEDURE — 80061 LIPID PANEL: CPT | Performed by: PHYSICAL MEDICINE & REHABILITATION

## 2020-12-15 PROCEDURE — 86762 RUBELLA ANTIBODY: CPT | Performed by: PHYSICAL MEDICINE & REHABILITATION

## 2020-12-15 PROCEDURE — 86765 RUBEOLA ANTIBODY: CPT | Performed by: PHYSICAL MEDICINE & REHABILITATION

## 2020-12-15 PROCEDURE — 81003 URINALYSIS AUTO W/O SCOPE: CPT | Performed by: PHYSICAL MEDICINE & REHABILITATION

## 2020-12-16 LAB
TSPOT INTERPRETATION: NEGATIVE
TSPOT NIL CONTROL INTERPRETATION: NORMAL
TSPOT PANEL A: 1
TSPOT PANEL B: 0
TSPOT POS CONTROL INTERPRETATION: NORMAL

## 2021-01-05 ENCOUNTER — OFFICE VISIT (OUTPATIENT)
Dept: FAMILY MEDICINE CLINIC | Facility: CLINIC | Age: 31
End: 2021-01-05

## 2021-01-05 VITALS
DIASTOLIC BLOOD PRESSURE: 90 MMHG | HEART RATE: 83 BPM | SYSTOLIC BLOOD PRESSURE: 144 MMHG | TEMPERATURE: 96.9 F | HEIGHT: 75 IN | OXYGEN SATURATION: 98 % | BODY MASS INDEX: 48.25 KG/M2

## 2021-01-05 DIAGNOSIS — E66.01 MORBIDLY OBESE (HCC): ICD-10-CM

## 2021-01-05 DIAGNOSIS — G89.29 CHRONIC PAIN OF LEFT KNEE: Primary | ICD-10-CM

## 2021-01-05 DIAGNOSIS — M25.562 CHRONIC PAIN OF LEFT KNEE: Primary | ICD-10-CM

## 2021-01-05 DIAGNOSIS — I10 ESSENTIAL HYPERTENSION: ICD-10-CM

## 2021-01-05 PROCEDURE — 99214 OFFICE O/P EST MOD 30 MIN: CPT | Performed by: NURSE PRACTITIONER

## 2021-01-05 RX ORDER — METOPROLOL SUCCINATE 50 MG/1
50 TABLET, EXTENDED RELEASE ORAL DAILY
Qty: 90 TABLET | Refills: 0 | Status: SHIPPED | OUTPATIENT
Start: 2021-01-05 | End: 2021-04-07 | Stop reason: SDUPTHER

## 2021-01-05 RX ORDER — CHLORTHALIDONE 25 MG/1
25 TABLET ORAL DAILY
Qty: 90 TABLET | Refills: 0 | Status: SHIPPED | OUTPATIENT
Start: 2021-01-05 | End: 2022-04-04 | Stop reason: HOSPADM

## 2021-01-05 NOTE — PROGRESS NOTES
"Subjective   Roscoe Arias is a 30 y.o. male who presents for a follow up on hypertension.     History of Present Illness   Blood pressure 140-150/100 at the station as well. Discussed not quite to goal. No symptoms  Gout flare up yesterday--1st in 7-8 months--taking copious water for flares  The following portions of the patient's history were reviewed and updated as appropriate: allergies, current medications, past family history, past medical history, past social history, past surgical history and problem list.    Review of Systems   Constitutional: Positive for appetite change, fatigue and unexpected weight change. Negative for activity change and fever.   HENT: Negative.    Eyes: Negative.  Negative for visual disturbance.   Respiratory: Negative.    Cardiovascular: Negative.  Negative for chest pain.   Gastrointestinal: Negative.  Negative for constipation and diarrhea.   Endocrine: Negative.    Genitourinary: Negative for difficulty urinating and frequency.   Musculoskeletal: Negative.    Skin: Negative.    Neurological: Negative for weakness and headaches.   Hematological: Negative.    Psychiatric/Behavioral: Negative.  Negative for dysphoric mood.     /90   Pulse 83   Temp 96.9 °F (36.1 °C) (Infrared)   Ht 190.5 cm (75\")   SpO2 98%   BMI 48.25 kg/m²     Objective   Physical Exam  Vitals signs and nursing note reviewed.   Constitutional:       Appearance: He is well-developed. He is not diaphoretic.   HENT:      Head: Normocephalic and atraumatic.   Neck:      Musculoskeletal: Normal range of motion and neck supple.      Thyroid: No thyromegaly.   Cardiovascular:      Rate and Rhythm: Normal rate and regular rhythm.      Pulses:           Carotid pulses are 2+ on the right side and 2+ on the left side.     Heart sounds: Normal heart sounds.   Pulmonary:      Effort: Pulmonary effort is normal.      Breath sounds: Normal breath sounds.   Lymphadenopathy:      Cervical: No cervical adenopathy. "   Psychiatric:         Behavior: Behavior normal.         Thought Content: Thought content normal.         Judgment: Judgment normal.       Assessment/Plan   Problems Addressed this Visit        Cardiac and Vasculature    Essential hypertension    Relevant Medications    chlorthalidone (HYGROTON) 25 MG tablet    metoprolol succinate XL (Toprol XL) 50 MG 24 hr tablet      Other Visit Diagnoses     Chronic pain of left knee    -  Primary    Morbidly obese (CMS/[facility])          Diagnoses       Codes Comments    Chronic pain of left knee    -  Primary ICD-10-CM: M25.562, G89.29  ICD-9-CM: 719.46, 338.29     Essential hypertension     ICD-10-CM: I10  ICD-9-CM: 401.9     Morbidly obese (CMS/[facility])     ICD-10-CM: E66.01  ICD-9-CM: 278.01         Knee pain--extensive crepitus--not overly tender. Would hold additional treatment for now and focus on alignment  HTN--rotate HCTZ to more potent chlorthalidone. Monitor for number of gout flares  Gout--recent uric acid 9.1 at physical--has not formally treated and voices if gets off feet, not overly bothersome  Obese--has gained weight during pandemic. If not reversing trend consider sleep study

## 2021-03-18 RX ORDER — METHYLPREDNISOLONE 4 MG/1
TABLET ORAL
Qty: 21 TABLET | Refills: 0 | Status: SHIPPED | OUTPATIENT
Start: 2021-03-18 | End: 2021-04-07

## 2021-04-07 ENCOUNTER — OFFICE VISIT (OUTPATIENT)
Dept: FAMILY MEDICINE CLINIC | Facility: CLINIC | Age: 31
End: 2021-04-07

## 2021-04-07 VITALS
HEIGHT: 75 IN | SYSTOLIC BLOOD PRESSURE: 138 MMHG | OXYGEN SATURATION: 98 % | TEMPERATURE: 97.5 F | HEART RATE: 74 BPM | BODY MASS INDEX: 39.17 KG/M2 | DIASTOLIC BLOOD PRESSURE: 88 MMHG | WEIGHT: 315 LBS

## 2021-04-07 DIAGNOSIS — I10 ESSENTIAL HYPERTENSION: ICD-10-CM

## 2021-04-07 DIAGNOSIS — Z23 IMMUNIZATION DUE: ICD-10-CM

## 2021-04-07 DIAGNOSIS — E66.01 MORBIDLY OBESE (HCC): Primary | ICD-10-CM

## 2021-04-07 DIAGNOSIS — K30 INDIGESTION: ICD-10-CM

## 2021-04-07 PROCEDURE — 90471 IMMUNIZATION ADMIN: CPT | Performed by: NURSE PRACTITIONER

## 2021-04-07 PROCEDURE — 90715 TDAP VACCINE 7 YRS/> IM: CPT | Performed by: NURSE PRACTITIONER

## 2021-04-07 PROCEDURE — 99214 OFFICE O/P EST MOD 30 MIN: CPT | Performed by: NURSE PRACTITIONER

## 2021-04-07 RX ORDER — METHYLPREDNISOLONE 4 MG/1
TABLET ORAL
Qty: 21 TABLET | Refills: 0 | Status: SHIPPED | OUTPATIENT
Start: 2021-04-07 | End: 2021-09-29 | Stop reason: SDUPTHER

## 2021-04-07 RX ORDER — TOPIRAMATE 50 MG/1
50 CAPSULE, EXTENDED RELEASE ORAL NIGHTLY
Qty: 30 CAPSULE | Refills: 5 | Status: SHIPPED | OUTPATIENT
Start: 2021-04-07 | End: 2021-12-28

## 2021-04-07 RX ORDER — FAMOTIDINE 20 MG/1
20 TABLET, FILM COATED ORAL 2 TIMES DAILY
Qty: 90 TABLET | Refills: 0 | Status: SHIPPED | OUTPATIENT
Start: 2021-04-07

## 2021-04-07 RX ORDER — METOPROLOL SUCCINATE 100 MG/1
100 TABLET, EXTENDED RELEASE ORAL DAILY
Qty: 90 TABLET | Refills: 0 | Status: SHIPPED | OUTPATIENT
Start: 2021-04-07
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
## 2021-04-16 ENCOUNTER — BULK ORDERING (OUTPATIENT)
Dept: CASE MANAGEMENT | Facility: OTHER | Age: 31
End: 2021-04-16

## 2021-04-16 DIAGNOSIS — Z23 IMMUNIZATION DUE: ICD-10-CM

## 2021-06-01 PROBLEM — M10.9 GOUT: Status: ACTIVE | Noted: 2021-06-01

## 2021-06-01 PROBLEM — G47.30 SLEEP APNEA: Status: ACTIVE | Noted: 2021-06-01

## 2021-09-29 RX ORDER — METHYLPREDNISOLONE 4 MG/1
TABLET ORAL
Qty: 21 TABLET | Refills: 0 | Status: SHIPPED | OUTPATIENT
Start: 2021-09-29 | End: 2021-12-28

## 2021-09-29 NOTE — TELEPHONE ENCOUNTER
Gout flare up in right foot that started 5 days ago. Pain was severe enough this morning he had to leave work.    skin suture

## 2021-09-29 NOTE — TELEPHONE ENCOUNTER
Caller: Roscoe Arias    Relationship: Self      Medication requested (name and dosage): methylPREDNISolone (Medrol) 4 MG dose pack    Pharmacy where request should be sent: [facility] [address] [address] [place] [address] - [phone]  - [phone]        Additional details provided by patient: PATIENT CALLING DOES NOT HAVE ANY ON HAND. PLEASE REFILL ON THIS PRESCRIPTION.    Best call back number: [phone] (H)    Does the patient have less than a 3 day supply:  [x] Yes  [] No    Anahi Myers Rep   09/29/21 08:54 EDT

## 2021-12-28 ENCOUNTER — OFFICE VISIT (OUTPATIENT)
Dept: FAMILY MEDICINE CLINIC | Facility: CLINIC | Age: 31
End: 2021-12-28

## 2021-12-28 VITALS
BODY MASS INDEX: 39.17 KG/M2 | DIASTOLIC BLOOD PRESSURE: 88 MMHG | HEART RATE: 71 BPM | SYSTOLIC BLOOD PRESSURE: 138 MMHG | WEIGHT: 315 LBS | TEMPERATURE: 97.1 F | OXYGEN SATURATION: 98 % | HEIGHT: 75 IN

## 2021-12-28 DIAGNOSIS — E66.01 MORBIDLY OBESE: Primary | ICD-10-CM

## 2021-12-28 DIAGNOSIS — G47.33 OBSTRUCTIVE SLEEP APNEA SYNDROME: ICD-10-CM

## 2021-12-28 DIAGNOSIS — Z90.3 S/P GASTRIC SLEEVE PROCEDURE: ICD-10-CM

## 2021-12-28 PROCEDURE — 99214 OFFICE O/P EST MOD 30 MIN: CPT | Performed by: NURSE PRACTITIONER

## 2021-12-30 LAB
25(OH)D3+25(OH)D2 SERPL-MCNC: 11.6 NG/ML (ref 30–100)
REQUEST PROBLEM: NORMAL
VIT B1 BLD-SCNC: NORMAL NMOL/L
VIT B12 SERPL-MCNC: 412 PG/ML (ref 232–1245)

## 2022-01-03 ENCOUNTER — CLINICAL SUPPORT (OUTPATIENT)
Dept: FAMILY MEDICINE CLINIC | Facility: CLINIC | Age: 32
End: 2022-01-03

## 2022-01-03 DIAGNOSIS — R05.9 COUGH: Primary | ICD-10-CM

## 2022-01-05 LAB
LABCORP SARS-COV-2, NAA 2 DAY TAT: NORMAL
SARS-COV-2 RNA RESP QL NAA+PROBE: DETECTED

## 2022-01-13 ENCOUNTER — TELEPHONE (OUTPATIENT)
Dept: FAMILY MEDICINE CLINIC | Facility: CLINIC | Age: 32
End: 2022-01-13

## 2022-01-13 NOTE — TELEPHONE ENCOUNTER
PATIENT STATES HE IS REQUESTING A RX BE SENT THE [facility] ON [address] FOR A B12 SHOT AND VITAMIN D INJECTION. PLEASE ADVISE THANK YOU!

## 2022-01-13 NOTE — TELEPHONE ENCOUNTER
The pharmacy wants him to get a script for the vit d 5000iu instead of getting OTC. He was also under the impression that he should take monthly b12 shots, but the note I see says daily oral b12. Please clarify.

## 2022-04-03 ENCOUNTER — APPOINTMENT (OUTPATIENT)
Dept: CT IMAGING | Facility: HOSPITAL | Age: 32
End: 2022-04-03

## 2022-04-03 ENCOUNTER — ANESTHESIA EVENT (OUTPATIENT)
Dept: PERIOP | Facility: HOSPITAL | Age: 32
End: 2022-04-03

## 2022-04-03 ENCOUNTER — APPOINTMENT (OUTPATIENT)
Dept: ULTRASOUND IMAGING | Facility: HOSPITAL | Age: 32
End: 2022-04-03

## 2022-04-03 ENCOUNTER — APPOINTMENT (OUTPATIENT)
Dept: GENERAL RADIOLOGY | Facility: HOSPITAL | Age: 32
End: 2022-04-03

## 2022-04-03 ENCOUNTER — ANESTHESIA (OUTPATIENT)
Dept: PERIOP | Facility: HOSPITAL | Age: 32
End: 2022-04-03

## 2022-04-03 ENCOUNTER — HOSPITAL ENCOUNTER (OUTPATIENT)
Facility: HOSPITAL | Age: 32
Discharge: HOME | End: 2022-04-04
Attending: EMERGENCY MEDICINE | Admitting: INTERNAL MEDICINE

## 2022-04-03 DIAGNOSIS — K80.43 CHOLEDOCHOLITHIASIS WITH ACUTE CHOLECYSTITIS WITH OBSTRUCTION: ICD-10-CM

## 2022-04-03 DIAGNOSIS — K81.9 CHOLECYSTITIS: ICD-10-CM

## 2022-04-03 DIAGNOSIS — K80.00 ACUTE CALCULOUS CHOLECYSTITIS: Primary | ICD-10-CM

## 2022-04-03 LAB
ALBUMIN SERPL-MCNC: 4.5 G/DL (ref 3.5–5.2)
ALBUMIN/GLOB SERPL: 1.5 G/DL
ALP SERPL-CCNC: 87 U/L (ref 39–117)
ALT SERPL W P-5'-P-CCNC: 106 U/L (ref 1–41)
ANION GAP SERPL CALCULATED.3IONS-SCNC: 12.9 MMOL/L (ref 5–15)
AST SERPL-CCNC: 164 U/L (ref 1–40)
BACTERIA UR QL AUTO: NORMAL /HPF
BASOPHILS # BLD AUTO: 0.06 10*3/MM3 (ref 0–0.2)
BASOPHILS NFR BLD AUTO: 0.8 % (ref 0–1.5)
BILIRUB SERPL-MCNC: 1.7 MG/DL (ref 0–1.2)
BILIRUB UR QL STRIP: NEGATIVE
BUN SERPL-MCNC: 6 MG/DL (ref 6–20)
BUN/CREAT SERPL: 7.1 (ref 7–25)
CALCIUM SPEC-SCNC: 9.9 MG/DL (ref 8.6–10.5)
CHLORIDE SERPL-SCNC: 102 MMOL/L (ref 98–107)
CLARITY UR: CLEAR
CO2 SERPL-SCNC: 26.1 MMOL/L (ref 22–29)
COLOR UR: ABNORMAL
CREAT SERPL-MCNC: 0.85 MG/DL (ref 0.76–1.27)
DEPRECATED RDW RBC AUTO: 46.9 FL (ref 37–54)
EGFRCR SERPLBLD CKD-EPI 2021: 119.1 ML/MIN/1.73
EOSINOPHIL # BLD AUTO: 0.19 10*3/MM3 (ref 0–0.4)
EOSINOPHIL NFR BLD AUTO: 2.6 % (ref 0.3–6.2)
ERYTHROCYTE [DISTWIDTH] IN BLOOD BY AUTOMATED COUNT: 15.4 % (ref 12.3–15.4)
GLOBULIN UR ELPH-MCNC: 3.1 GM/DL
GLUCOSE SERPL-MCNC: 111 MG/DL (ref 65–99)
GLUCOSE UR STRIP-MCNC: NEGATIVE MG/DL
HCT VFR BLD AUTO: 45.4 % (ref 37.5–51)
HGB BLD-MCNC: 16.3 G/DL (ref 13–17.7)
HGB UR QL STRIP.AUTO: NEGATIVE
HYALINE CASTS UR QL AUTO: NORMAL /LPF
IMM GRANULOCYTES # BLD AUTO: 0.02 10*3/MM3 (ref 0–0.05)
IMM GRANULOCYTES NFR BLD AUTO: 0.3 % (ref 0–0.5)
KETONES UR QL STRIP: ABNORMAL
LEUKOCYTE ESTERASE UR QL STRIP.AUTO: ABNORMAL
LIPASE SERPL-CCNC: 22 U/L (ref 13–60)
LYMPHOCYTES # BLD AUTO: 2.05 10*3/MM3 (ref 0.7–3.1)
LYMPHOCYTES NFR BLD AUTO: 27.7 % (ref 19.6–45.3)
MCH RBC QN AUTO: 30.5 PG (ref 26.6–33)
MCHC RBC AUTO-ENTMCNC: 35.9 G/DL (ref 31.5–35.7)
MCV RBC AUTO: 84.9 FL (ref 79–97)
MONOCYTES # BLD AUTO: 0.77 10*3/MM3 (ref 0.1–0.9)
MONOCYTES NFR BLD AUTO: 10.4 % (ref 5–12)
NEUTROPHILS NFR BLD AUTO: 4.31 10*3/MM3 (ref 1.7–7)
NEUTROPHILS NFR BLD AUTO: 58.2 % (ref 42.7–76)
NITRITE UR QL STRIP: NEGATIVE
NRBC BLD AUTO-RTO: 0 /100 WBC (ref 0–0.2)
PH UR STRIP.AUTO: 5.5 [PH] (ref 5–8)
PLATELET # BLD AUTO: 202 10*3/MM3 (ref 140–450)
PMV BLD AUTO: 12.2 FL (ref 6–12)
POTASSIUM SERPL-SCNC: 3.7 MMOL/L (ref 3.5–5.2)
PROT SERPL-MCNC: 7.6 G/DL (ref 6–8.5)
PROT UR QL STRIP: ABNORMAL
QT INTERVAL: 391 MS
RBC # BLD AUTO: 5.35 10*6/MM3 (ref 4.14–5.8)
RBC # UR STRIP: NORMAL /HPF
REF LAB TEST METHOD: NORMAL
SARS-COV-2 RNA PNL SPEC NAA+PROBE: NOT DETECTED
SODIUM SERPL-SCNC: 141 MMOL/L (ref 136–145)
SP GR UR STRIP: 1.02 (ref 1–1.03)
SQUAMOUS #/AREA URNS HPF: NORMAL /HPF
TROPONIN T SERPL-MCNC: <0.01 NG/ML (ref 0–0.03)
UROBILINOGEN UR QL STRIP: ABNORMAL
WBC # UR STRIP: NORMAL /HPF
WBC NRBC COR # BLD: 7.4 10*3/MM3 (ref 3.4–10.8)

## 2022-04-03 PROCEDURE — 76705 ECHO EXAM OF ABDOMEN: CPT

## 2022-04-03 PROCEDURE — C9803 HOPD COVID-19 SPEC COLLECT: HCPCS

## 2022-04-03 PROCEDURE — 93010 ELECTROCARDIOGRAM REPORT: CPT | Performed by: INTERNAL MEDICINE

## 2022-04-03 PROCEDURE — 74177 CT ABD & PELVIS W/CONTRAST: CPT

## 2022-04-03 PROCEDURE — 25010000002 MORPHINE PER 10 MG: Performed by: EMERGENCY MEDICINE

## 2022-04-03 PROCEDURE — 83690 ASSAY OF LIPASE: CPT

## 2022-04-03 PROCEDURE — 99219 PR INITIAL OBSERVATION CARE/DAY 50 MINUTES: CPT | Performed by: SURGERY

## 2022-04-03 PROCEDURE — 25010000002 ONDANSETRON PER 1 MG: Performed by: INTERNAL MEDICINE

## 2022-04-03 PROCEDURE — 74300 X-RAY BILE DUCTS/PANCREAS: CPT

## 2022-04-03 PROCEDURE — 93005 ELECTROCARDIOGRAM TRACING: CPT | Performed by: EMERGENCY MEDICINE

## 2022-04-03 PROCEDURE — 47563 LAPARO CHOLECYSTECTOMY/GRAPH: CPT

## 2022-04-03 PROCEDURE — G0378 HOSPITAL OBSERVATION PER HR: HCPCS

## 2022-04-03 PROCEDURE — 25010000002 IOPAMIDOL 61 % SOLUTION: Performed by: EMERGENCY MEDICINE

## 2022-04-03 PROCEDURE — 25010000002 KETOROLAC TROMETHAMINE PER 15 MG: Performed by: NURSE ANESTHETIST, CERTIFIED REGISTERED

## 2022-04-03 PROCEDURE — 25010000002 PROPOFOL 10 MG/ML EMULSION: Performed by: NURSE ANESTHETIST, CERTIFIED REGISTERED

## 2022-04-03 PROCEDURE — 25010000002 ONDANSETRON PER 1 MG: Performed by: NURSE ANESTHETIST, CERTIFIED REGISTERED

## 2022-04-03 PROCEDURE — 93005 ELECTROCARDIOGRAM TRACING: CPT | Performed by: ANESTHESIOLOGY

## 2022-04-03 PROCEDURE — 96375 TX/PRO/DX INJ NEW DRUG ADDON: CPT

## 2022-04-03 PROCEDURE — C1889 IMPLANT/INSERT DEVICE, NOC: HCPCS | Performed by: SURGERY

## 2022-04-03 PROCEDURE — 25010000002 ONDANSETRON PER 1 MG: Performed by: EMERGENCY MEDICINE

## 2022-04-03 PROCEDURE — 25010000002 ONDANSETRON PER 1 MG: Performed by: SURGERY

## 2022-04-03 PROCEDURE — 25010000002 DEXAMETHASONE PER 1 MG: Performed by: NURSE ANESTHETIST, CERTIFIED REGISTERED

## 2022-04-03 PROCEDURE — 80053 COMPREHEN METABOLIC PANEL: CPT

## 2022-04-03 PROCEDURE — 96374 THER/PROPH/DIAG INJ IV PUSH: CPT

## 2022-04-03 PROCEDURE — 25010000002 MIDAZOLAM PER 1 MG: Performed by: ANESTHESIOLOGY

## 2022-04-03 PROCEDURE — 25010000002 FENTANYL CITRATE (PF) 50 MCG/ML SOLUTION: Performed by: NURSE ANESTHETIST, CERTIFIED REGISTERED

## 2022-04-03 PROCEDURE — 84484 ASSAY OF TROPONIN QUANT: CPT | Performed by: EMERGENCY MEDICINE

## 2022-04-03 PROCEDURE — 25010000002 PIPERACILLIN SOD-TAZOBACTAM PER 1 G: Performed by: SURGERY

## 2022-04-03 PROCEDURE — 25010000002 PIPERACILLIN SOD-TAZOBACTAM PER 1 G: Performed by: EMERGENCY MEDICINE

## 2022-04-03 PROCEDURE — 88304 TISSUE EXAM BY PATHOLOGIST: CPT | Performed by: SURGERY

## 2022-04-03 PROCEDURE — 47563 LAPARO CHOLECYSTECTOMY/GRAPH: CPT | Performed by: SURGERY

## 2022-04-03 PROCEDURE — 25010000002 NEOSTIGMINE 5 MG/10ML SOLUTION: Performed by: NURSE ANESTHETIST, CERTIFIED REGISTERED

## 2022-04-03 PROCEDURE — 99284 EMERGENCY DEPT VISIT MOD MDM: CPT

## 2022-04-03 PROCEDURE — 25010000002 PIPERACILLIN SOD-TAZOBACTAM PER 1 G: Performed by: INTERNAL MEDICINE

## 2022-04-03 PROCEDURE — 25010000002 HYDROMORPHONE PER 4 MG: Performed by: NURSE ANESTHETIST, CERTIFIED REGISTERED

## 2022-04-03 PROCEDURE — 81001 URINALYSIS AUTO W/SCOPE: CPT

## 2022-04-03 PROCEDURE — 85025 COMPLETE CBC W/AUTO DIFF WBC: CPT

## 2022-04-03 PROCEDURE — 87635 SARS-COV-2 COVID-19 AMP PRB: CPT | Performed by: EMERGENCY MEDICINE

## 2022-04-03 PROCEDURE — 0 IOTHALAMATE 60 % SOLUTION: Performed by: SURGERY

## 2022-04-03 PROCEDURE — 25010000002 HYDRALAZINE PER 20 MG: Performed by: NURSE ANESTHETIST, CERTIFIED REGISTERED

## 2022-04-03 DEVICE — LIGAMAX 5 MM ENDOSCOPIC MULTIPLE CLIP APPLIER
Type: IMPLANTABLE DEVICE | Site: ABDOMEN | Status: FUNCTIONAL
Brand: LIGAMAX

## 2022-04-03 RX ORDER — METOPROLOL SUCCINATE 100 MG/1
100 TABLET, EXTENDED RELEASE ORAL DAILY
Status: DISCONTINUED | OUTPATIENT
Start: 2022-04-03 | End: 2022-04-04 | Stop reason: HOSPADM

## 2022-04-03 RX ORDER — MORPHINE SULFATE 2 MG/ML
4 INJECTION, SOLUTION INTRAMUSCULAR; INTRAVENOUS ONCE
Status: COMPLETED | OUTPATIENT
Start: 2022-04-03 | End: 2022-04-03

## 2022-04-03 RX ORDER — PROMETHAZINE HYDROCHLORIDE 25 MG/1
25 SUPPOSITORY RECTAL ONCE AS NEEDED
Status: DISCONTINUED | OUTPATIENT
Start: 2022-04-03 | End: 2022-04-03 | Stop reason: HOSPADM

## 2022-04-03 RX ORDER — BUPIVACAINE HYDROCHLORIDE AND EPINEPHRINE 5; 5 MG/ML; UG/ML
INJECTION, SOLUTION EPIDURAL; INTRACAUDAL; PERINEURAL AS NEEDED
Status: DISCONTINUED | OUTPATIENT
Start: 2022-04-03 | End: 2022-04-03 | Stop reason: HOSPADM

## 2022-04-03 RX ORDER — ONDANSETRON 2 MG/ML
INJECTION INTRAMUSCULAR; INTRAVENOUS AS NEEDED
Status: DISCONTINUED | OUTPATIENT
Start: 2022-04-03 | End: 2022-04-03 | Stop reason: SURG

## 2022-04-03 RX ORDER — PROMETHAZINE HYDROCHLORIDE 25 MG/1
25 TABLET ORAL ONCE AS NEEDED
Status: DISCONTINUED | OUTPATIENT
Start: 2022-04-03 | End: 2022-04-03 | Stop reason: HOSPADM

## 2022-04-03 RX ORDER — HYDROMORPHONE HCL 110MG/55ML
PATIENT CONTROLLED ANALGESIA SYRINGE INTRAVENOUS AS NEEDED
Status: DISCONTINUED | OUTPATIENT
Start: 2022-04-03 | End: 2022-04-03 | Stop reason: SURG

## 2022-04-03 RX ORDER — SODIUM CHLORIDE 0.9 % (FLUSH) 0.9 %
10 SYRINGE (ML) INJECTION AS NEEDED
Status: DISCONTINUED | OUTPATIENT
Start: 2022-04-03 | End: 2022-04-04 | Stop reason: HOSPADM

## 2022-04-03 RX ORDER — NALOXONE HCL 0.4 MG/ML
0.4 VIAL (ML) INJECTION
Status: DISCONTINUED | OUTPATIENT
Start: 2022-04-03 | End: 2022-04-04

## 2022-04-03 RX ORDER — FENTANYL CITRATE 50 UG/ML
INJECTION, SOLUTION INTRAMUSCULAR; INTRAVENOUS AS NEEDED
Status: DISCONTINUED | OUTPATIENT
Start: 2022-04-03 | End: 2022-04-03 | Stop reason: SURG

## 2022-04-03 RX ORDER — LABETALOL HYDROCHLORIDE 5 MG/ML
5 INJECTION, SOLUTION INTRAVENOUS
Status: DISCONTINUED | OUTPATIENT
Start: 2022-04-03 | End: 2022-04-03 | Stop reason: HOSPADM

## 2022-04-03 RX ORDER — EPHEDRINE SULFATE 50 MG/ML
5 INJECTION, SOLUTION INTRAVENOUS ONCE AS NEEDED
Status: DISCONTINUED | OUTPATIENT
Start: 2022-04-03 | End: 2022-04-03 | Stop reason: HOSPADM

## 2022-04-03 RX ORDER — FLUMAZENIL 0.1 MG/ML
0.2 INJECTION INTRAVENOUS AS NEEDED
Status: DISCONTINUED | OUTPATIENT
Start: 2022-04-03 | End: 2022-04-03 | Stop reason: HOSPADM

## 2022-04-03 RX ORDER — FAMOTIDINE 10 MG/ML
20 INJECTION, SOLUTION INTRAVENOUS ONCE
Status: COMPLETED | OUTPATIENT
Start: 2022-04-03 | End: 2022-04-03

## 2022-04-03 RX ORDER — SODIUM CHLORIDE 0.9 % (FLUSH) 0.9 %
3-10 SYRINGE (ML) INJECTION AS NEEDED
Status: DISCONTINUED | OUTPATIENT
Start: 2022-04-03 | End: 2022-04-03 | Stop reason: HOSPADM

## 2022-04-03 RX ORDER — SODIUM CHLORIDE 0.9 % (FLUSH) 0.9 %
3 SYRINGE (ML) INJECTION EVERY 12 HOURS SCHEDULED
Status: DISCONTINUED | OUTPATIENT
Start: 2022-04-03 | End: 2022-04-03 | Stop reason: HOSPADM

## 2022-04-03 RX ORDER — MAGNESIUM HYDROXIDE 1200 MG/15ML
LIQUID ORAL AS NEEDED
Status: DISCONTINUED | OUTPATIENT
Start: 2022-04-03 | End: 2022-04-03 | Stop reason: HOSPADM

## 2022-04-03 RX ORDER — FENTANYL CITRATE 50 UG/ML
50 INJECTION, SOLUTION INTRAMUSCULAR; INTRAVENOUS
Status: DISCONTINUED | OUTPATIENT
Start: 2022-04-03 | End: 2022-04-03 | Stop reason: HOSPADM

## 2022-04-03 RX ORDER — LIDOCAINE HYDROCHLORIDE 20 MG/ML
INJECTION, SOLUTION INFILTRATION; PERINEURAL AS NEEDED
Status: DISCONTINUED | OUTPATIENT
Start: 2022-04-03 | End: 2022-04-03 | Stop reason: SURG

## 2022-04-03 RX ORDER — HYDROMORPHONE HYDROCHLORIDE 1 MG/ML
0.5 INJECTION, SOLUTION INTRAMUSCULAR; INTRAVENOUS; SUBCUTANEOUS
Status: DISCONTINUED | OUTPATIENT
Start: 2022-04-03 | End: 2022-04-04

## 2022-04-03 RX ORDER — DEXAMETHASONE SODIUM PHOSPHATE 10 MG/ML
INJECTION INTRAMUSCULAR; INTRAVENOUS AS NEEDED
Status: DISCONTINUED | OUTPATIENT
Start: 2022-04-03 | End: 2022-04-03 | Stop reason: SURG

## 2022-04-03 RX ORDER — PROPOFOL 10 MG/ML
VIAL (ML) INTRAVENOUS AS NEEDED
Status: DISCONTINUED | OUTPATIENT
Start: 2022-04-03 | End: 2022-04-03 | Stop reason: SURG

## 2022-04-03 RX ORDER — HYDROMORPHONE HYDROCHLORIDE 1 MG/ML
0.5 INJECTION, SOLUTION INTRAMUSCULAR; INTRAVENOUS; SUBCUTANEOUS
Status: DISCONTINUED | OUTPATIENT
Start: 2022-04-03 | End: 2022-04-03 | Stop reason: HOSPADM

## 2022-04-03 RX ORDER — DIPHENHYDRAMINE HCL 25 MG
25 CAPSULE ORAL
Status: DISCONTINUED | OUTPATIENT
Start: 2022-04-03 | End: 2022-04-03 | Stop reason: HOSPADM

## 2022-04-03 RX ORDER — OXYCODONE HYDROCHLORIDE AND ACETAMINOPHEN 5; 325 MG/1; MG/1
1 TABLET ORAL EVERY 4 HOURS PRN
Status: DISCONTINUED | OUTPATIENT
Start: 2022-04-03 | End: 2022-04-04 | Stop reason: HOSPADM

## 2022-04-03 RX ORDER — KETOROLAC TROMETHAMINE 30 MG/ML
INJECTION, SOLUTION INTRAMUSCULAR; INTRAVENOUS AS NEEDED
Status: DISCONTINUED | OUTPATIENT
Start: 2022-04-03 | End: 2022-04-03 | Stop reason: SURG

## 2022-04-03 RX ORDER — FAMOTIDINE 20 MG/1
20 TABLET, FILM COATED ORAL 2 TIMES DAILY
Status: DISCONTINUED | OUTPATIENT
Start: 2022-04-03 | End: 2022-04-04 | Stop reason: HOSPADM

## 2022-04-03 RX ORDER — DIPHENHYDRAMINE HYDROCHLORIDE 50 MG/ML
12.5 INJECTION INTRAMUSCULAR; INTRAVENOUS
Status: DISCONTINUED | OUTPATIENT
Start: 2022-04-03 | End: 2022-04-03 | Stop reason: HOSPADM

## 2022-04-03 RX ORDER — NALOXONE HCL 0.4 MG/ML
0.2 VIAL (ML) INJECTION AS NEEDED
Status: DISCONTINUED | OUTPATIENT
Start: 2022-04-03 | End: 2022-04-03 | Stop reason: HOSPADM

## 2022-04-03 RX ORDER — ONDANSETRON 2 MG/ML
4 INJECTION INTRAMUSCULAR; INTRAVENOUS EVERY 6 HOURS PRN
Status: DISCONTINUED | OUTPATIENT
Start: 2022-04-03 | End: 2022-04-04 | Stop reason: HOSPADM

## 2022-04-03 RX ORDER — DEXTROSE, SODIUM CHLORIDE, AND POTASSIUM CHLORIDE 5; .45; .15 G/100ML; G/100ML; G/100ML
100 INJECTION INTRAVENOUS CONTINUOUS
Status: DISCONTINUED | OUTPATIENT
Start: 2022-04-03 | End: 2022-04-04

## 2022-04-03 RX ORDER — HYDROCODONE BITARTRATE AND ACETAMINOPHEN 7.5; 325 MG/1; MG/1
1 TABLET ORAL ONCE AS NEEDED
Status: DISCONTINUED | OUTPATIENT
Start: 2022-04-03 | End: 2022-04-03 | Stop reason: HOSPADM

## 2022-04-03 RX ORDER — LIDOCAINE HYDROCHLORIDE 10 MG/ML
0.5 INJECTION, SOLUTION EPIDURAL; INFILTRATION; INTRACAUDAL; PERINEURAL ONCE AS NEEDED
Status: DISCONTINUED | OUTPATIENT
Start: 2022-04-03 | End: 2022-04-03 | Stop reason: HOSPADM

## 2022-04-03 RX ORDER — ONDANSETRON 2 MG/ML
4 INJECTION INTRAMUSCULAR; INTRAVENOUS ONCE AS NEEDED
Status: DISCONTINUED | OUTPATIENT
Start: 2022-04-03 | End: 2022-04-03 | Stop reason: HOSPADM

## 2022-04-03 RX ORDER — SODIUM CHLORIDE 0.9 % (FLUSH) 0.9 %
10 SYRINGE (ML) INJECTION EVERY 12 HOURS SCHEDULED
Status: DISCONTINUED | OUTPATIENT
Start: 2022-04-03 | End: 2022-04-04 | Stop reason: HOSPADM

## 2022-04-03 RX ORDER — ONDANSETRON 2 MG/ML
4 INJECTION INTRAMUSCULAR; INTRAVENOUS ONCE
Status: COMPLETED | OUTPATIENT
Start: 2022-04-03 | End: 2022-04-03

## 2022-04-03 RX ORDER — SODIUM CHLORIDE 9 MG/ML
INJECTION, SOLUTION INTRAVENOUS AS NEEDED
Status: DISCONTINUED | OUTPATIENT
Start: 2022-04-03 | End: 2022-04-03 | Stop reason: HOSPADM

## 2022-04-03 RX ORDER — ROCURONIUM BROMIDE 10 MG/ML
INJECTION, SOLUTION INTRAVENOUS AS NEEDED
Status: DISCONTINUED | OUTPATIENT
Start: 2022-04-03 | End: 2022-04-03 | Stop reason: SURG

## 2022-04-03 RX ORDER — MIDAZOLAM HYDROCHLORIDE 1 MG/ML
1 INJECTION INTRAMUSCULAR; INTRAVENOUS
Status: COMPLETED | OUTPATIENT
Start: 2022-04-03 | End: 2022-04-03

## 2022-04-03 RX ORDER — GLYCOPYRROLATE 0.2 MG/ML
INJECTION INTRAMUSCULAR; INTRAVENOUS AS NEEDED
Status: DISCONTINUED | OUTPATIENT
Start: 2022-04-03 | End: 2022-04-03 | Stop reason: SURG

## 2022-04-03 RX ORDER — SODIUM CHLORIDE, SODIUM LACTATE, POTASSIUM CHLORIDE, CALCIUM CHLORIDE 600; 310; 30; 20 MG/100ML; MG/100ML; MG/100ML; MG/100ML
9 INJECTION, SOLUTION INTRAVENOUS CONTINUOUS
Status: DISCONTINUED | OUTPATIENT
Start: 2022-04-03 | End: 2022-04-03

## 2022-04-03 RX ORDER — IBUPROFEN 600 MG/1
600 TABLET ORAL ONCE AS NEEDED
Status: DISCONTINUED | OUTPATIENT
Start: 2022-04-03 | End: 2022-04-03 | Stop reason: HOSPADM

## 2022-04-03 RX ORDER — CHLORTHALIDONE 25 MG/1
25 TABLET ORAL DAILY
Status: DISCONTINUED | OUTPATIENT
Start: 2022-04-03 | End: 2022-04-04

## 2022-04-03 RX ORDER — HYDRALAZINE HYDROCHLORIDE 20 MG/ML
5 INJECTION INTRAMUSCULAR; INTRAVENOUS
Status: DISCONTINUED | OUTPATIENT
Start: 2022-04-03 | End: 2022-04-03 | Stop reason: HOSPADM

## 2022-04-03 RX ORDER — NEOSTIGMINE METHYLSULFATE 0.5 MG/ML
INJECTION, SOLUTION INTRAVENOUS AS NEEDED
Status: DISCONTINUED | OUTPATIENT
Start: 2022-04-03 | End: 2022-04-03 | Stop reason: SURG

## 2022-04-03 RX ORDER — OXYCODONE AND ACETAMINOPHEN 7.5; 325 MG/1; MG/1
1 TABLET ORAL EVERY 4 HOURS PRN
Status: DISCONTINUED | OUTPATIENT
Start: 2022-04-03 | End: 2022-04-03 | Stop reason: HOSPADM

## 2022-04-03 RX ADMIN — Medication 10 ML: at 09:11

## 2022-04-03 RX ADMIN — GLYCOPYRROLATE 0.3 MG: 0.2 INJECTION INTRAMUSCULAR; INTRAVENOUS at 14:12

## 2022-04-03 RX ADMIN — NEOSTIGMINE METHYLSULFATE 2 MG: 0.5 INJECTION INTRAVENOUS at 15:17

## 2022-04-03 RX ADMIN — ROCURONIUM BROMIDE 50 MG: 50 INJECTION INTRAVENOUS at 14:13

## 2022-04-03 RX ADMIN — MIDAZOLAM 1 MG: 1 INJECTION INTRAMUSCULAR; INTRAVENOUS at 13:37

## 2022-04-03 RX ADMIN — POTASSIUM CHLORIDE, DEXTROSE MONOHYDRATE AND SODIUM CHLORIDE 100 ML/HR: 150; 5; 450 INJECTION, SOLUTION INTRAVENOUS at 09:09

## 2022-04-03 RX ADMIN — KETOROLAC TROMETHAMINE 30 MG: 30 INJECTION, SOLUTION INTRAMUSCULAR at 15:01

## 2022-04-03 RX ADMIN — GLYCOPYRROLATE 0.4 MG: 0.2 INJECTION INTRAMUSCULAR; INTRAVENOUS at 15:08

## 2022-04-03 RX ADMIN — MIDAZOLAM 1 MG: 1 INJECTION INTRAMUSCULAR; INTRAVENOUS at 13:26

## 2022-04-03 RX ADMIN — NEOSTIGMINE METHYLSULFATE 3 MG: 0.5 INJECTION INTRAVENOUS at 15:08

## 2022-04-03 RX ADMIN — SODIUM CHLORIDE, POTASSIUM CHLORIDE, SODIUM LACTATE AND CALCIUM CHLORIDE 9 ML/HR: 600; 310; 30; 20 INJECTION, SOLUTION INTRAVENOUS at 13:29

## 2022-04-03 RX ADMIN — TAZOBACTAM SODIUM AND PIPERACILLIN SODIUM 3.38 G: 375; 3 INJECTION, SOLUTION INTRAVENOUS at 17:09

## 2022-04-03 RX ADMIN — HYDROMORPHONE HYDROCHLORIDE 1 MG: 2 INJECTION, SOLUTION INTRAMUSCULAR; INTRAVENOUS; SUBCUTANEOUS at 14:48

## 2022-04-03 RX ADMIN — METOPROLOL SUCCINATE 100 MG: 100 TABLET, EXTENDED RELEASE ORAL at 11:26

## 2022-04-03 RX ADMIN — ONDANSETRON 4 MG: 2 INJECTION INTRAMUSCULAR; INTRAVENOUS at 04:16

## 2022-04-03 RX ADMIN — FAMOTIDINE 20 MG: 10 INJECTION INTRAVENOUS at 13:26

## 2022-04-03 RX ADMIN — ONDANSETRON 4 MG: 2 INJECTION INTRAMUSCULAR; INTRAVENOUS at 18:12

## 2022-04-03 RX ADMIN — GLYCOPYRROLATE 0.4 MG: 0.2 INJECTION INTRAMUSCULAR; INTRAVENOUS at 15:17

## 2022-04-03 RX ADMIN — FENTANYL CITRATE 100 MCG: 0.05 INJECTION, SOLUTION INTRAMUSCULAR; INTRAVENOUS at 14:12

## 2022-04-03 RX ADMIN — IOPAMIDOL 100 ML: 612 INJECTION, SOLUTION INTRAVENOUS at 05:14

## 2022-04-03 RX ADMIN — MORPHINE SULFATE 4 MG: 2 INJECTION, SOLUTION INTRAMUSCULAR; INTRAVENOUS at 04:17

## 2022-04-03 RX ADMIN — DEXAMETHASONE SODIUM PHOSPHATE 8 MG: 10 INJECTION INTRAMUSCULAR; INTRAVENOUS at 14:20

## 2022-04-03 RX ADMIN — CHLORTHALIDONE 25 MG: 25 TABLET ORAL at 11:26

## 2022-04-03 RX ADMIN — FAMOTIDINE 20 MG: 20 TABLET ORAL at 20:53

## 2022-04-03 RX ADMIN — TAZOBACTAM SODIUM AND PIPERACILLIN SODIUM 3.38 G: 375; 3 INJECTION, SOLUTION INTRAVENOUS at 06:28

## 2022-04-03 RX ADMIN — PROPOFOL 300 MG: 10 INJECTION, EMULSION INTRAVENOUS at 14:13

## 2022-04-03 RX ADMIN — FAMOTIDINE 20 MG: 20 TABLET ORAL at 09:09

## 2022-04-03 RX ADMIN — LIDOCAINE HYDROCHLORIDE 100 MG: 20 INJECTION, SOLUTION INFILTRATION; PERINEURAL at 14:13

## 2022-04-03 RX ADMIN — HYDRALAZINE HYDROCHLORIDE 5 MG: 20 INJECTION INTRAMUSCULAR; INTRAVENOUS at 17:03

## 2022-04-03 RX ADMIN — ONDANSETRON 4 MG: 2 INJECTION INTRAMUSCULAR; INTRAVENOUS at 15:01

## 2022-04-03 RX ADMIN — TAZOBACTAM SODIUM AND PIPERACILLIN SODIUM 3.38 G: 375; 3 INJECTION, SOLUTION INTRAVENOUS at 09:09

## 2022-04-03 NOTE — ANESTHESIA PROCEDURE NOTES
Airway  Urgency: elective    Date/Time: 4/3/2022 2:16 PM  Airway not difficult    General Information and Staff    Patient location during procedure: OR  Anesthesiologist: Carl Vela MD  CRNA: Mae Max CRNA    Indications and Patient Condition  Indications for airway management: airway protection    Preoxygenated: yes  MILS not maintained throughout  Mask difficulty assessment: 1 - vent by mask    Final Airway Details  Final airway type: endotracheal airway      Successful airway: ETT  Cuffed: yes   Successful intubation technique: direct laryngoscopy  Endotracheal tube insertion site: oral  Blade: Sukhi  Blade size: 4  ETT size (mm): 8.0  Cormack-Lehane Classification: grade I - full view of glottis  Placement verified by: chest auscultation and capnometry   Cuff volume (mL): 8  Measured from: lips  ETT/EBT  to lips (cm): 22  Number of attempts at approach: 1  Assessment: lips, teeth, and gum same as pre-op and atraumatic intubation    Additional Comments  Pt preoxygenated prior to induction, easy mask airway, atraumatic intubation,+ ETCO2, + bs bilat,  ETT secured and connected to ventilator.

## 2022-04-03 NOTE — ED PROVIDER NOTES
EMERGENCY DEPARTMENT ENCOUNTER    Room Number:  12/12  Date of encounter:  4/3/2022  PCP: Tanya Burton APRN  Historian: Patient      HPI:  Chief Complaint: Abdominal pain  A complete HPI/ROS/PMH/PSH/SH/FH are unobtainable due to: None    Context: Roscoe Arias is a 31 y.o. male who presents to the ED c/o upper abdominal pain started after eating some Mexican food.  Patient has prior history of gallbladder attacks.  Patient is status post gastric sleeve as of November 2021.  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      FAMILY HISTORY  Family History   Problem Relation Age of Onset   • Obesity Mother    • Hypertension Mother    • Sleep apnea Mother    • Obesity Sister    • Hypertension Sister    • Diabetes Maternal Grandmother    • Hypertension Maternal Grandmother    • Obesity Maternal Grandfather    • Hypertension Maternal Grandfather    • Diabetes Paternal Grandmother    • Hypertension Paternal Grandmother    • Cancer Paternal Grandfather          SOCIAL HISTORY  Social History     Socioeconomic History   • Marital status: Significant Other   Tobacco Use   • Smoking status: Former Smoker     Years: 2.00     Quit date: 6/1/2011     Years since quitting: 10.8   • Smokeless tobacco: Former User     Types: Chew   Substance and Sexual Activity   • Alcohol use: Yes     Comment: 2-3X WEEKLY    • Drug use: Never         ALLERGIES  Patient has no known allergies.        REVIEW OF SYSTEMS  Review of Systems     All systems reviewed and negative except for those discussed in HPI.       PHYSICAL EXAM    I have reviewed the triage vital signs and nursing notes.    ED Triage Vitals [04/03/22 0351]   Temp Heart Rate Resp BP SpO2   97.4 °F (36.3 °C) 98 20 -- 99 %      Temp src Heart Rate Source Patient Position BP Location FiO2 (%)   Tympanic Monitor -- -- --       Physical Exam  GENERAL: Mild distressed  HENT: nares patent  EYES: no scleral icterus  CV: regular rhythm, regular rate  RESPIRATORY: normal effort  ABDOMEN: soft, right upper quadrant tenderness there is no rebound or guarding   MUSCULOSKELETAL: no deformity  NEURO: alert, moves all extremities, follows commands  SKIN: warm, dry        LAB RESULTS  Recent Results (from the past 24 hour(s))   Comprehensive Metabolic Panel    Collection Time: 04/03/22  4:08 AM    Specimen: Arm, Right; Blood   Result Value Ref Range    Glucose 111 (H) 65 - 99 mg/dL    BUN 6 6 - 20 mg/dL    Creatinine 0.85 0.76 - 1.27 mg/dL    Sodium 141 136 - 145 mmol/L    Potassium 3.7 3.5 - 5.2 mmol/L    Chloride 102 98 - 107 mmol/L    CO2 26.1 22.0 - 29.0 mmol/L    Calcium 9.9 8.6 - 10.5 mg/dL    Total Protein 7.6 6.0 - 8.5 g/dL    Albumin 4.50 3.50 - 5.20 g/dL    ALT (SGPT) 106 (H) 1 - 41 U/L    AST (SGOT) 164 (H) 1 - 40 U/L    Alkaline Phosphatase 87 39 - 117 U/L    Total Bilirubin 1.7 (H) 0.0 - 1.2 mg/dL    Globulin 3.1 gm/dL    A/G Ratio 1.5 g/dL    BUN/Creatinine Ratio 7.1 7.0 - 25.0    Anion Gap 12.9 5.0 - 15.0 mmol/L    eGFR 119.1 >60.0 mL/min/1.73   Lipase    Collection Time: 04/03/22  4:08 AM    Specimen: Arm, Right; Blood   Result Value Ref Range    Lipase 22 13 - 60 U/L   CBC Auto Differential    Collection Time: 04/03/22  4:08 AM    Specimen: Arm, Right; Blood   Result Value Ref Range    WBC 7.40 3.40 - 10.80 10*3/mm3    RBC 5.35 4.14 - 5.80 10*6/mm3    Hemoglobin 16.3  13.0 - 17.7 g/dL    Hematocrit 45.4 37.5 - 51.0 %    MCV 84.9 79.0 - 97.0 fL    MCH 30.5 26.6 - 33.0 pg    MCHC 35.9 (H) 31.5 - 35.7 g/dL    RDW 15.4 12.3 - 15.4 %    RDW-SD 46.9 37.0 - 54.0 fl    MPV 12.2 (H) 6.0 - 12.0 fL    Platelets 202 140 - 450 10*3/mm3    Neutrophil % 58.2 42.7 - 76.0 %    Lymphocyte % 27.7 19.6 - 45.3 %    Monocyte % 10.4 5.0 - 12.0 %    Eosinophil % 2.6 0.3 - 6.2 %    Basophil % 0.8 0.0 - 1.5 %    Immature Grans % 0.3 0.0 - 0.5 %    Neutrophils, Absolute 4.31 1.70 - 7.00 10*3/mm3    Lymphocytes, Absolute 2.05 0.70 - 3.10 10*3/mm3    Monocytes, Absolute 0.77 0.10 - 0.90 10*3/mm3    Eosinophils, Absolute 0.19 0.00 - 0.40 10*3/mm3    Basophils, Absolute 0.06 0.00 - 0.20 10*3/mm3    Immature Grans, Absolute 0.02 0.00 - 0.05 10*3/mm3    nRBC 0.0 0.0 - 0.2 /100 WBC   Troponin    Collection Time: 04/03/22  4:08 AM    Specimen: Arm, Right; Blood   Result Value Ref Range    Troponin T <0.010 0.000 - 0.030 ng/mL   Urinalysis With Microscopic If Indicated (No Culture) - Urine, Clean Catch    Collection Time: 04/03/22  4:20 AM    Specimen: Urine, Clean Catch   Result Value Ref Range    Color, UA Dark Yellow (A) Yellow, Straw    Appearance, UA Clear Clear    pH, UA 5.5 5.0 - 8.0    Specific Gravity, UA 1.019 1.005 - 1.030    Glucose, UA Negative Negative    Ketones, UA Trace (A) Negative    Bilirubin, UA Negative Negative    Blood, UA Negative Negative    Protein, UA 30 mg/dL (1+) (A) Negative    Leuk Esterase, UA Trace (A) Negative    Nitrite, UA Negative Negative    Urobilinogen, UA 1.0 E.U./dL 0.2 - 1.0 E.U./dL   Urinalysis, Microscopic Only - Urine, Clean Catch    Collection Time: 04/03/22  4:20 AM    Specimen: Urine, Clean Catch   Result Value Ref Range    RBC, UA 0-2 None Seen, 0-2 /HPF    WBC, UA 0-2 None Seen, 0-2 /HPF    Bacteria, UA None Seen None Seen /HPF    Squamous Epithelial Cells, UA 0-2 None Seen, 0-2 /HPF    Hyaline Casts, UA 7-12 None Seen /LPF    Methodology Automated Microscopy     ECG 12 Lead    Collection Time: 04/03/22  4:21 AM   Result Value Ref Range    QT Interval 391 ms       Ordered the above labs and independently reviewed the results.        RADIOLOGY  CT Abdomen Pelvis With Contrast    Result Date: 4/3/2022  CT OF THE ABDOMEN AND PELVIS WITH CONTRAST  HISTORY: Upper abdominal pain  COMPARISON: None available.  TECHNIQUE: Axial CT imaging was obtained through the abdomen and pelvis. IV contrast was administered.  FINDINGS: Images through the lung bases demonstrate some dependent atelectasis. No suspicious hepatic lesions are seen. There is mild splenomegaly, with the spleen measuring 13.9 cm in AP dimensions. There are changes of prior gastric sleeve procedure. Duodenum, adrenal glands, and pancreas are within normal limits. There is some relative hyperemia around the gallbladder fossa, and there is also some stranding within the gallbladder fossa, as well as potentially some gallbladder wall edema, best seen on the coronal series. Appearance is concerning for acute cholecystitis. Ultrasound would be confirmatory. There is no intra or extrahepatic biliary dilatation. The kidneys enhance symmetrically. There is no hydronephrosis. There is a 5 mm nonobstructing stone within the right kidney, as well as a possible 2 mm stone within the superior pole of the right kidney. No distal ureteral or bladder stones are seen. Prostate gland is within normal limits. There is mild diverticulosis. The appendix is normal. There is a tiny fat-containing umbilical hernia. No acute osseous abnormalities are identified.       1. There is some stranding around the patient's gallbladder, as well as a suggestion of some gallbladder wall edema. Appearance is concerning for acute cholecystitis. Ultrasound would be confirmatory.  Radiation dose reduction techniques were utilized, including automated exposure control and exposure modulation based on body size.  This report was finalized on 4/3/2022 5:41 AM by  Dr. Mine Abreu M.D.      US Gallbladder    Result Date: 4/3/2022  GALLBLADDER ULTRASOUND  HISTORY: Pain  COMPARISON: 04/03/2022  TECHNIQUE: Grayscale and color Doppler sonographic images were obtained through the right upper quadrant.  FINDINGS: Pancreas is not well-seen due to overlying bowel gas. However, the patient's pancreas was normal in appearance on earlier CT. Liver is enlarged, measuring up to 18.6 cm in craniocaudal dimensions. It also appears to be steatotic, and is difficult to penetrate. There is no intra or extrahepatic biliary dilatation. Common bile duct measures up to 5 mm. Stones and sludge are seen within the gallbladder. There is some mild gallbladder wall thickening, which measures up to 4 mm. No sonographic Tan's sign was elicited, although the patient was medicated prior to the examination. Right kidney measures 12.1 x 5.7 x 6.3 cm. Renal echotexture is normal. There is a prominent extra renal pelvis.       1. Stones and sludge are seen within the gallbladder, and there is mild gallbladder wall thickening, measuring up to 4 mm. Prior study suggested some gallbladder wall edema as well as some pericholecystic stranding. This is not well seen on the current study, but I think findings remain concerning for acute cholecystitis. If clinical doubt persists, consideration for HIDA scan is recommended. 2. Hepatomegaly and diffuse hepatic steatosis.  This report was finalized on 4/3/2022 5:51 AM by Dr. Mine Abreu M.D.        I ordered the above noted radiological studies. Reviewed by me and discussed with radiologist.  See dictation for official radiology interpretation.      PROCEDURES    Procedures      MEDICATIONS GIVEN IN ER    Medications   sodium chloride 0.9 % flush 10 mL (has no administration in time range)   piperacillin-tazobactam (ZOSYN) 3.375 g in iso-osmotic dextrose 50 ml (premix) (3.375 g Intravenous New Bag 4/3/22 0628)   morphine injection 4 mg (4 mg Intravenous  Given 4/3/22 0417)   ondansetron (ZOFRAN) injection 4 mg (4 mg Intravenous Given 4/3/22 6236)   iopamidol (ISOVUE-300) 61 % injection 100 mL (100 mL Intravenous Given 4/3/22 2714)         PROGRESS, DATA ANALYSIS, CONSULTS, AND MEDICAL DECISION MAKING    All labs have been independently reviewed by me.  All radiology studies have been reviewed by me and discussed with radiologist dictating the report.   EKG's independently viewed and interpreted by me.  Discussion below represents my analysis of pertinent findings related to patient's condition, differential diagnosis, treatment plan and final disposition.        ED Course as of 04/03/22 0635   Sun Apr 03, 2022 0423 EKG          EKG time: 0421  Rhythm/Rate: Sinus rhythm rate 63  P waves and CT: Normal  QRS, axis: Narrow regular  ST and T waves: Normal    Interpreted Contemporaneously by me, independently viewed [TJ]      ED Course User Index  [TJ] Carlito Carranza MD           PPE: The patient wore a surgical mask throughout the entire patient encounter. I wore an N95.    AS OF 06:35 EDT VITALS:    BP - 148/94  HR - 59  TEMP - 97.4 °F (36.3 °C) (Tympanic)  O2 SATS - 96%        DIAGNOSIS  Final diagnoses:   Cholecystitis         DISPOSITION  Admit           Carlito Carranza MD  04/03/22 0635

## 2022-04-03 NOTE — ED NOTES
"Nursing report ED to floor  Roscoe Arias  31 y.o.  male    HPI :   Chief Complaint   Patient presents with   • Abdominal Pain       Admitting doctor:   Maddi Peter MD    Admitting diagnosis:   The primary encounter diagnosis was Acute calculous cholecystitis. A diagnosis of Cholecystitis was also pertinent to this visit.    Code status:   Current Code Status     Date Active Code Status Order ID Comments User Context       4/3/2022 0626 CPR (Attempt to Resuscitate) [id number]  Maddi Peter MD ED     Advance Care Planning Activity      Questions for Current Code Status     Question Answer    Code Status (Patient has no pulse and is not breathing) CPR (Attempt to Resuscitate)    Medical Interventions (Patient has pulse or is breathing) Full Support    Level Of Support Discussed With Patient          Allergies:   Patient has no known allergies.    Intake and Output  No intake or output data in the 24 hours ending 04/03/22 0651    Weight:       04/03/22  0403   Weight: (!) 138 kg (305 lb)       Most recent vitals:   Vitals:    04/03/22 0351 04/03/22 0403 04/03/22 0501 04/03/22 0532   BP:   (!) 143/103 148/94   Pulse: 98  60 59   Resp: 20      Temp: 97.4 °F (36.3 °C)      TempSrc: Tympanic      SpO2: 99%  94% 96%   Weight:  (!) 138 kg (305 lb)     Height: 190.5 cm (75\") 190.5 cm (75\")         Active LDAs/IV Access:   Lines, Drains & Airways     Active LDAs     Name Placement date Placement time Site Days    Peripheral IV 04/03/22 0400 Right;Distal Antecubital 04/03/22  0400  Antecubital  less than 1                Labs (abnormal labs have a star):   Labs Reviewed   COMPREHENSIVE METABOLIC PANEL - Abnormal; Notable for the following components:       Result Value    Glucose 111 (*)     ALT (SGPT) 106 (*)     AST (SGOT) 164 (*)     Total Bilirubin 1.7 (*)     All other components within normal limits    Narrative:     GFR Normal >60  Chronic Kidney Disease <60  Kidney Failure <15     URINALYSIS W/ MICROSCOPIC " IF INDICATED (NO CULTURE) - Abnormal; Notable for the following components:    Color, UA Dark Yellow (*)     Ketones, UA Trace (*)     Protein, UA 30 mg/dL (1+) (*)     Leuk Esterase, UA Trace (*)     All other components within normal limits   CBC WITH AUTO DIFFERENTIAL - Abnormal; Notable for the following components:    MCHC 35.9 (*)     MPV 12.2 (*)     All other components within normal limits   LIPASE - Normal   TROPONIN (IN-HOUSE) - Normal    Narrative:     Troponin T Reference Range:  <= 0.03 ng/mL-   Negative for AMI  >0.03 ng/mL-     Abnormal for myocardial necrosis.  Clinicians would have to utilize clinical acumen, EKG, Troponin and serial changes to determine if it is an Acute Myocardial Infarction or myocardial injury due to an underlying chronic condition.       Results may be falsely decreased if patient taking Biotin.     COVID PRE-OP / PRE-PROCEDURE SCREENING ORDER (NO ISOLATION)    Narrative:     The following orders were created for panel order COVID PRE-OP / PRE-PROCEDURE SCREENING ORDER (NO ISOLATION) - Swab, Nasopharynx.  Procedure                               Abnormality         Status                     ---------                               -----------         ------                     COVID-19, EVE IN-HOUSE...[[id number]]                      In process                   Please view results for these tests on the individual orders.   COVID-19, EVE IN-HOUSE CEPHEID/LOR, NP SWAB IN TRANSPORT MEDIA 8-12 HR TAT   URINALYSIS, MICROSCOPIC ONLY   CBC AND DIFFERENTIAL    Narrative:     The following orders were created for panel order CBC & Differential.  Procedure                               Abnormality         Status                     ---------                               -----------         ------                     CBC Auto Differential[[id number]]        Abnormal            Final result                 Please view results for these tests on the individual orders.       EKG:    ECG 12 Lead   Preliminary Result   HEART RATE= 63  bpm   RR Interval= 960  ms   SC Interval= 184  ms   P Horizontal Axis=   deg   P Front Axis= 37  deg   QRSD Interval= 90  ms   QT Interval= 391  ms   QRS Axis= 5  deg   T Wave Axis= 28  deg   - NORMAL ECG -   Sinus rhythm   Electronically Signed By:    Date and Time of Study: 2022-04-03 04:21:14          Meds given in ED:   Medications   sodium chloride 0.9 % flush 10 mL (has no administration in time range)   piperacillin-tazobactam (ZOSYN) 3.375 g in iso-osmotic dextrose 50 ml (premix) (3.375 g Intravenous New Bag 4/3/22 0628)   morphine injection 4 mg (4 mg Intravenous Given 4/3/22 2917)   ondansetron (ZOFRAN) injection 4 mg (4 mg Intravenous Given 4/3/22 4776)   iopamidol (ISOVUE-300) 61 % injection 100 mL (100 mL Intravenous Given 4/3/22 8992)       Imaging results:  CT Abdomen Pelvis With Contrast    Result Date: 4/3/2022   1. There is some stranding around the patient's gallbladder, as well as a suggestion of some gallbladder wall edema. Appearance is concerning for acute cholecystitis. Ultrasound would be confirmatory.  Radiation dose reduction techniques were utilized, including automated exposure control and exposure modulation based on body size.  This report was finalized on 4/3/2022 5:41 AM by Dr. Mine Abreu M.D.      US Gallbladder    Result Date: 4/3/2022   1. Stones and sludge are seen within the gallbladder, and there is mild gallbladder wall thickening, measuring up to 4 mm. Prior study suggested some gallbladder wall edema as well as some pericholecystic stranding. This is not well seen on the current study, but I think findings remain concerning for acute cholecystitis. If clinical doubt persists, consideration for HIDA scan is recommended. 2. Hepatomegaly and diffuse hepatic steatosis.  This report was finalized on 4/3/2022 5:51 AM by Dr. Mine Abreu M.D.        Ambulatory status:   - ad barbara    Social issues:   Social History      Socioeconomic History   • Marital status: Significant Other   Tobacco Use   • Smoking status: Former Smoker     Years: 2.00     Quit date: 6/1/2011     Years since quitting: 10.8   • Smokeless tobacco: Former User     Types: Chew   Substance and Sexual Activity   • Alcohol use: Yes     Comment: 2-3X WEEKLY   • Drug use: Never       NIH Stroke Scale:        Nursing report ED to floor:

## 2022-04-03 NOTE — H&P
General Surgery H&P    CC: Abdominal pain    HPI:   The patient is a very pleasant 31 y.o. male that presented to the hospital with acute onset right upper quadrant abdominal pain with radiation into the back and right shoulder that started around 2 AM.  He ate Mexican food for dinner last night.  He has a history of a previous gallbladder attack in late November around Thanksgiving, a couple of weeks after he underwent a laparoscopic gastric sleeve procedure in [place].  He says this episode felt similar but more intense.  He denies associated fever, chills, jaundice, or scleral icterus.  He had a single episode of severe nausea with dry heaving last night.  He came to the emergency room after the pain was constant, progressively worsening in severity, and showed no improvement with rest.  In the ER a CT abdomen/pelvis demonstrated what appeared to be acute cholecystitis.  A gallbladder ultrasound was performed which confirmed stones and sludge in the gallbladder with mild gallbladder wall thickening suggestive of acute calculus cholecystitis.    Past Medical History:   Hypertension    Past Surgical History:   Laparoscopic gastric sleeve (done in [place])    Medications:  Medications Prior to Admission   Medication Sig Dispense Refill Last Dose   • chlorthalidone (HYGROTON) 25 MG tablet Take 1 tablet by mouth Daily. 90 tablet 0    • famotidine (Pepcid) 20 MG tablet Take 1 tablet by mouth 2 (Two) Times a Day. 90 tablet 0    • metoprolol succinate XL (Toprol XL) 100 MG 24 hr tablet Take 1 tablet by mouth Daily. 90 tablet 0    • vitamin D3 (Vitamin D) 125 MCG (5000 UT) capsule capsule Take 1 capsule by mouth Daily. 90 capsule 3        Allergies: No known drug allergies    Social History: Single but in a long-term relationship, former smoker but quit in 2011, social alcohol use    Family History: Mother with history of obesity and sleep apnea, sister with history of obesity and hypertension    Review of Systems:  A  comprehensive review of systems was negative except for the following positives: Abdominal pain, nausea, and vomiting    Physical Exam:   Vitals:    04/03/22 0700   BP: (!) 161/102   Pulse: 60   Resp: 17   Temp: 97 °F (36.1 °C)   SpO2: 97%     Height: 190 cm  Weight: 138 kg  BMI: 38.12  GENERAL: no acute distress, awake and alert  HEENT: normocephalic, atraumatic, no scleral icterus, moist mucous membranes  NECK: Supple, there is no thyromegaly or lymphadenopathy  RESPIRATORY: clear to auscultation bilaterally, no wheezes  CARDIOVASCULAR: regular rate and rhythm   GASTROINTESTINAL: Soft, nondistended, nontender, well-healed surgical scars from previous laparoscopic gastric sleeve procedure  MUSCULOSKELETAL: no cyanosis, clubbing, or edema  NEUROLOGIC: alert and oriented, normal speech, cranial nerves 2-12 grossly intact, no focal deficits  SKIN: Moist, warm, no rashes, no jaundice    Diagnostic workup:     Pertinent labs:   Results from last 7 days   Lab Units 04/03/22  0408   WBC 10*3/mm3 7.40   HEMOGLOBIN g/dL 16.3   HEMATOCRIT % 45.4   PLATELETS 10*3/mm3 202     Results from last 7 days   Lab Units 04/03/22  0408   SODIUM mmol/L 141   POTASSIUM mmol/L 3.7   CHLORIDE mmol/L 102   CO2 mmol/L 26.1   BUN mg/dL 6   CREATININE mg/dL 0.85   CALCIUM mg/dL 9.9   BILIRUBIN mg/dL 1.7*   ALK PHOS U/L 87   ALT (SGPT) U/L 106*   AST (SGOT) U/L 164*   GLUCOSE mg/dL 111*       IMAGING:  CT ABD/PELVIS:  IMPRESSION:  There is some stranding around the patient's gallbladder, as well as a suggestion of some gallbladder wall edema. Appearance is concerning for acute cholecystitis. Ultrasound would be confirmatory.    GALLBLADDER ULTRASOUND:  IMPRESSION:  1. Stones and sludge are seen within the gallbladder, and there is mild gallbladder wall thickening, measuring up to 4 mm. Prior study suggested some gallbladder wall edema as well as some pericholecystic stranding. This is not well seen on the current study, but I think findings  remain concerning for acute cholecystitis. If clinical doubt persists, consideration for HIDA scan is recommended.  2. Hepatomegaly and diffuse hepatic steatosis.    Assessment and plan:     The patient is a 31 y.o. male with acute calculus cholecystitis and transaminitis/hyperbilirubinemia    I reviewed the blood work and CT/gallbladder ultrasound done in the ER and discussed the findings with him this morning.  He has what appears to be acute calculus cholecystitis, and I am also concerned he may have choledocholithiasis given his elevated LFTs and bilirubin.  I will plan for laparoscopic cholecystectomy with intraoperative cholangiogram today and have started him on empiric Zosyn.  He should remain strict n.p.o.  We discussed the risks of the procedure to include bleeding, possible common bile duct injury, and possible postoperative bile leak.  Despite these risks he has consented to proceed and all questions were answered.    Maddi Peter MD  General, Robotic, and Endoscopic Surgery  [facility]    [address], [address]  [place] [address]  P: [phone]  F: [phone]

## 2022-04-03 NOTE — ANESTHESIA PREPROCEDURE EVALUATION
Anesthesia Evaluation     Patient summary reviewed and Nursing notes reviewed   no history of anesthetic complications:  NPO Solid Status: > 8 hours  NPO Liquid Status: > 2 hours           Airway   Mallampati: II  TM distance: >3 FB  Neck ROM: full  no difficulty expected  Dental - normal exam     Pulmonary - normal exam   (+) a smoker Former, sleep apnea on CPAP,   (-) COPD, asthma, lung cancer  Cardiovascular - normal exam  Exercise tolerance: excellent (>7 METS)    ECG reviewed  Patient on routine beta blocker and Beta blocker given within 24 hours of surgery  Rhythm: regular  Rate: normal    (+) hypertension well controlled less than 2 medications,   (-) valvular problems/murmurs, past MI, CAD, dysrhythmias, angina, CHF, orthopnea, cardiac stents, CABG      Neuro/Psych- negative ROS  (-) seizures, TIA, CVA  GI/Hepatic/Renal/Endo    (+) obesity,     (-) hiatal hernia, GERD, PUD, hepatitis, liver disease, no renal disease, diabetes, GI bleed, no thyroid disorder    Musculoskeletal (-) negative ROS    Abdominal  - normal exam   Substance History - negative use     OB/GYN negative ob/gyn ROS         Other - negative ROS                       Anesthesia Plan    ASA 2     general     intravenous induction     Anesthetic plan, all risks, benefits, and alternatives have been provided, discussed and informed consent has been obtained with: patient.    Plan discussed with CRNA.        CODE STATUS:    Level Of Support Discussed With: Patient  Code Status (Patient has no pulse and is not breathing): CPR (Attempt to Resuscitate)  Medical Interventions (Patient has pulse or is breathing): Full Support

## 2022-04-03 NOTE — ED NOTES
Pt ambulatory to triage from home with c/o upper abdominal pain, last meal 5-6 hours prior, ate mexican food.  Pt history of gall bladder attacks.  Pt had gastric sleeve on Nov 2021. Pt wearing mask in triage. Triage personnel wore appropriate PPE

## 2022-04-03 NOTE — ANESTHESIA POSTPROCEDURE EVALUATION
"Patient: Roscoe Arias    Procedure Summary     Date: 04/03/22 Room / Location: [facility] OR [address] [place] [address] MAIN OR    Anesthesia Start: 1403 Anesthesia Stop: 1527    Procedure: Laparoscopic cholecystectomy with cholangiogram (N/A Abdomen) Diagnosis:       Acute calculous cholecystitis      (Acute calculous cholecystitis [K80.00])    Surgeons: Maddi Peter MD Provider: Carl Vela MD    Anesthesia Type: general ASA Status: 2          Anesthesia Type: general    Vitals  Vitals Value Taken Time   /98 04/03/22 1616   Temp 36.8 °C (98.3 °F) 04/03/22 1524   Pulse 55 04/03/22 1625   Resp 20 04/03/22 1615   SpO2 98 % 04/03/22 1625   Vitals shown include unvalidated device data.        Post Anesthesia Care and Evaluation    Patient location during evaluation: bedside  Patient participation: complete - patient participated  Level of consciousness: awake and alert  Pain score: 0  Pain management: adequate  Airway patency: patent  Anesthetic complications: No anesthetic complications    Cardiovascular status: acceptable  Respiratory status: acceptable  Hydration status: acceptable    Comments: /98   Pulse (!) 46   Temp 36.8 °C (98.3 °F) (Oral)   Resp 20   Ht 190.5 cm (75\")   Wt (!) 138 kg (305 lb)   SpO2 99%   BMI 38.12 kg/m²       "

## 2022-04-03 NOTE — OP NOTE
Operative Note :  Maddi Peter MD    Roscoe Arias  1990    Procedure Date: 04/03/22    Pre-op Diagnosis:  Acute calculous cholecystitis [K80.00]    Post-op Diagnosis:  Acute calculous cholecystitis [K80.00]  Choledocholithiasis with biliary obstruction    Procedure:   Laparoscopic cholecystectomy with cholangiogram    Surgeon: Maddi Peter MD    Assistant: Maddi Moreno PA-C (Maddi was responsible for laparoscopic manipulation of the gallbladder during critical portions of the dissection, handling of the laparoscopic camera, closure of all surgical incisions, and application of topical sterile dressings)    Anesthesia:  General (general endotracheal tube)    Estimated Blood Loss: minimal    Specimens:  Gallbladder    Complications: None    Indications: The patient is a 31-year-old gentleman who was admitted through the emergency room early this morning with acute onset right upper quadrant abdominal pain that started at 2 AM.  He was found to have what appears to be acute calculus cholecystitis with elevated LFTs concerning for potential choledocholithiasis.  I have recommended proceeding to the operating today for laparoscopic cholecystectomy with cholangiogram.  He understands the risks of the procedure include bleeding, possible common bile duct injury, possible postoperative bile leak, and possible need for an ERCP if the cholangiogram demonstrates any evidence of choledocholithiasis.  Despite all these risks, he has consented to proceed.    Findings: Distal obstruction of the common bile duct from presumed choledocholithiasis    Description of procedure:  The patient was brought to the operating room and placed on the OR table in supine position.  An endotracheal tube was inserted, and general anesthesia was induced.  The anterior abdominal wall was shaved, prepped, and draped in a sterile fashion.  A surgical timeout was then completed.  A curvilinear infraumbilical skin incision was  made after instillation of 0.5% Marcaine with epinephrine.  The umbilical stalk was grasped and elevated, and a longitudinal fasciotomy made.  A Bacon trocar was inserted and secured with 2 separate 0 Vicryl stay sutures.  The abdomen was then insufflated.  Under direct visualization, 3 additional 5 mm trocars were then placed within the subxiphoid and subcostal space on the right.  The gallbladder was retracted cephalad and infundibulum retracted inferiorly. The cystic duct and cystic artery were slowly dissected out circumferentially until a firm critical view was obtained.  A single Hemoclip was placed across the cystic duct at its junction with the gallbladder infundibulum and 3 hemoclips were placed across the cystic artery.  A small hole was created on the anterior wall of the cystic duct, and a cholangiogram catheter inserted through a right upper quadrant angiocatheter.  The catheter was secured within the duct with an additional Hemoclip and a cholangiogram was then obtained.  The cholangiogram showed normal filling of the cystic duct and common bile duct, retrograde filling of the intrahepatic ducts, but there was no spillage into the duodenum.  As I injected more contrast, I met significant resistance due to distal common bile duct obstruction from presumed choledocholithiasis.  The catheter was then withdrawn and 2 additional hemoclips placed across the cystic duct.  The cystic duct and artery were then transected, leaving 2 clips behind on both of the stumps.  The gallbladder was then slowly dissected off of the undersurface of the liver using electrocautery and it was removed from the peritoneal cavity within an Endo Catch bag at the umbilical trocar site.  The gallbladder was passed off to pathology.  The abdomen was then reinsufflated and right upper quadrant inspected. Warm normal saline was irrigated and suctioned dry.  The gallbladder fossa appeared hemostatic.  All trocars were then removed  under direct visualization and the abdomen desufflated.  The umbilical fascial incision was closed using a new 0 Vicryl figure-of-eight suture, all skin incisions closed with 4-0 Vicryl subcuticular stitches, and then each was dressed with Exofin glue.  The patient was then extubated and transferred to PACU in stable condition.  All counts were correct per nursing.    Recommendations:  He will require ERCP for common bile duct stone clearance.  I will consult Dr. Godwin for this and he should remain n.p.o. after midnight tonight.    Maddi Peter MD  General, Robotic, and Endoscopic Surgery  [facility]    [address], [address]  [place] [address]  P: [phone]  F: [phone]

## 2022-04-03 NOTE — ED NOTES
"Pt states \"Having upper abdominal pain, feels similar to when I had gallbladder trouble before. I don't feel nauseated, but have had some spontaneous vomiting.\"      Patient was placed in face mask during first look triage.  Patient was wearing a face mask throughout encounter.  I wore personal protective equipment throughout the encounter.  Hand hygiene was performed before and after patient encounter.    "

## 2022-04-04 ENCOUNTER — READMISSION MANAGEMENT (OUTPATIENT)
Dept: CALL CENTER | Facility: HOSPITAL | Age: 32
End: 2022-04-04

## 2022-04-04 ENCOUNTER — ANESTHESIA EVENT (OUTPATIENT)
Dept: GASTROENTEROLOGY | Facility: HOSPITAL | Age: 32
End: 2022-04-04

## 2022-04-04 ENCOUNTER — APPOINTMENT (OUTPATIENT)
Dept: GENERAL RADIOLOGY | Facility: HOSPITAL | Age: 32
End: 2022-04-04

## 2022-04-04 ENCOUNTER — ANESTHESIA (OUTPATIENT)
Dept: GASTROENTEROLOGY | Facility: HOSPITAL | Age: 32
End: 2022-04-04

## 2022-04-04 VITALS
OXYGEN SATURATION: 98 % | DIASTOLIC BLOOD PRESSURE: 95 MMHG | BODY MASS INDEX: 37.92 KG/M2 | RESPIRATION RATE: 16 BRPM | HEART RATE: 61 BPM | WEIGHT: 305 LBS | SYSTOLIC BLOOD PRESSURE: 150 MMHG | TEMPERATURE: 97.3 F | HEIGHT: 75 IN

## 2022-04-04 PROBLEM — K81.9 CHOLECYSTITIS: Status: ACTIVE | Noted: 2022-04-04

## 2022-04-04 LAB
ALBUMIN SERPL-MCNC: 4.3 G/DL (ref 3.5–5.2)
ALBUMIN/GLOB SERPL: 1.5 G/DL
ALP SERPL-CCNC: 107 U/L (ref 39–117)
ALT SERPL W P-5'-P-CCNC: 330 U/L (ref 1–41)
ANION GAP SERPL CALCULATED.3IONS-SCNC: 10 MMOL/L (ref 5–15)
AST SERPL-CCNC: 279 U/L (ref 1–40)
BASOPHILS # BLD AUTO: 0.02 10*3/MM3 (ref 0–0.2)
BASOPHILS NFR BLD AUTO: 0.2 % (ref 0–1.5)
BILIRUB SERPL-MCNC: 1.7 MG/DL (ref 0–1.2)
BUN SERPL-MCNC: 6 MG/DL (ref 6–20)
BUN/CREAT SERPL: 6 (ref 7–25)
CALCIUM SPEC-SCNC: 9.8 MG/DL (ref 8.6–10.5)
CHLORIDE SERPL-SCNC: 102 MMOL/L (ref 98–107)
CO2 SERPL-SCNC: 30 MMOL/L (ref 22–29)
CREAT SERPL-MCNC: 1 MG/DL (ref 0.76–1.27)
DEPRECATED RDW RBC AUTO: 46.5 FL (ref 37–54)
EGFRCR SERPLBLD CKD-EPI 2021: 103.2 ML/MIN/1.73
EOSINOPHIL # BLD AUTO: 0.02 10*3/MM3 (ref 0–0.4)
EOSINOPHIL NFR BLD AUTO: 0.2 % (ref 0.3–6.2)
ERYTHROCYTE [DISTWIDTH] IN BLOOD BY AUTOMATED COUNT: 15.6 % (ref 12.3–15.4)
GLOBULIN UR ELPH-MCNC: 2.8 GM/DL
GLUCOSE SERPL-MCNC: 107 MG/DL (ref 65–99)
HCT VFR BLD AUTO: 47.7 % (ref 37.5–51)
HGB BLD-MCNC: 16.9 G/DL (ref 13–17.7)
IMM GRANULOCYTES # BLD AUTO: 0.03 10*3/MM3 (ref 0–0.05)
IMM GRANULOCYTES NFR BLD AUTO: 0.3 % (ref 0–0.5)
LYMPHOCYTES # BLD AUTO: 1.47 10*3/MM3 (ref 0.7–3.1)
LYMPHOCYTES NFR BLD AUTO: 14.6 % (ref 19.6–45.3)
MCH RBC QN AUTO: 29.9 PG (ref 26.6–33)
MCHC RBC AUTO-ENTMCNC: 35.4 G/DL (ref 31.5–35.7)
MCV RBC AUTO: 84.3 FL (ref 79–97)
MONOCYTES # BLD AUTO: 0.85 10*3/MM3 (ref 0.1–0.9)
MONOCYTES NFR BLD AUTO: 8.4 % (ref 5–12)
NEUTROPHILS NFR BLD AUTO: 7.69 10*3/MM3 (ref 1.7–7)
NEUTROPHILS NFR BLD AUTO: 76.3 % (ref 42.7–76)
NRBC BLD AUTO-RTO: 0 /100 WBC (ref 0–0.2)
PLATELET # BLD AUTO: 183 10*3/MM3 (ref 140–450)
PMV BLD AUTO: 11.8 FL (ref 6–12)
POTASSIUM SERPL-SCNC: 4.1 MMOL/L (ref 3.5–5.2)
PROT SERPL-MCNC: 7.1 G/DL (ref 6–8.5)
QT INTERVAL: 454 MS
RBC # BLD AUTO: 5.66 10*6/MM3 (ref 4.14–5.8)
SODIUM SERPL-SCNC: 142 MMOL/L (ref 136–145)
WBC NRBC COR # BLD: 10.08 10*3/MM3 (ref 3.4–10.8)

## 2022-04-04 PROCEDURE — 43262 ENDO CHOLANGIOPANCREATOGRAPH: CPT | Performed by: INTERNAL MEDICINE

## 2022-04-04 PROCEDURE — 85025 COMPLETE CBC W/AUTO DIFF WBC: CPT | Performed by: SURGERY

## 2022-04-04 PROCEDURE — 25010000002 HYDRALAZINE PER 20 MG: Performed by: NURSE ANESTHETIST, CERTIFIED REGISTERED

## 2022-04-04 PROCEDURE — G0378 HOSPITAL OBSERVATION PER HR: HCPCS

## 2022-04-04 PROCEDURE — 0 IOPAMIDOL PER 1 ML: Performed by: INTERNAL MEDICINE

## 2022-04-04 PROCEDURE — 74328 X-RAY BILE DUCT ENDOSCOPY: CPT

## 2022-04-04 PROCEDURE — 25010000002 PIPERACILLIN SOD-TAZOBACTAM PER 1 G: Performed by: SURGERY

## 2022-04-04 PROCEDURE — 43264 ERCP REMOVE DUCT CALCULI: CPT | Performed by: INTERNAL MEDICINE

## 2022-04-04 PROCEDURE — 99243 OFF/OP CNSLTJ NEW/EST LOW 30: CPT | Performed by: INTERNAL MEDICINE

## 2022-04-04 PROCEDURE — 25010000002 PIPERACILLIN SOD-TAZOBACTAM PER 1 G: Performed by: INTERNAL MEDICINE

## 2022-04-04 PROCEDURE — 25010000002 PROPOFOL 10 MG/ML EMULSION: Performed by: NURSE ANESTHETIST, CERTIFIED REGISTERED

## 2022-04-04 PROCEDURE — 99024 POSTOP FOLLOW-UP VISIT: CPT | Performed by: SURGERY

## 2022-04-04 PROCEDURE — 80053 COMPREHEN METABOLIC PANEL: CPT | Performed by: INTERNAL MEDICINE

## 2022-04-04 PROCEDURE — C1769 GUIDE WIRE: HCPCS | Performed by: INTERNAL MEDICINE

## 2022-04-04 RX ORDER — SODIUM CHLORIDE 9 MG/ML
30 INJECTION, SOLUTION INTRAVENOUS CONTINUOUS PRN
Status: DISCONTINUED | OUTPATIENT
Start: 2022-04-04 | End: 2022-04-04 | Stop reason: HOSPADM

## 2022-04-04 RX ORDER — HYDRALAZINE HYDROCHLORIDE 20 MG/ML
INJECTION INTRAMUSCULAR; INTRAVENOUS AS NEEDED
Status: DISCONTINUED | OUTPATIENT
Start: 2022-04-04 | End: 2022-04-04 | Stop reason: SURG

## 2022-04-04 RX ORDER — OXYCODONE HYDROCHLORIDE AND ACETAMINOPHEN 5; 325 MG/1; MG/1
1 TABLET ORAL EVERY 4 HOURS PRN
Qty: 15 TABLET | Refills: 0 | Status: SHIPPED | OUTPATIENT
Start: 2022-04-04 | End: 2022-04-15

## 2022-04-04 RX ORDER — PROPOFOL 10 MG/ML
VIAL (ML) INTRAVENOUS AS NEEDED
Status: DISCONTINUED | OUTPATIENT
Start: 2022-04-04 | End: 2022-04-04 | Stop reason: SURG

## 2022-04-04 RX ORDER — PROPOFOL 10 MG/ML
VIAL (ML) INTRAVENOUS CONTINUOUS PRN
Status: DISCONTINUED | OUTPATIENT
Start: 2022-04-04 | End: 2022-04-04 | Stop reason: SURG

## 2022-04-04 RX ADMIN — HYDRALAZINE HYDROCHLORIDE 5 MG: 20 INJECTION, SOLUTION INTRAMUSCULAR; INTRAVENOUS at 12:14

## 2022-04-04 RX ADMIN — Medication 200 MCG/KG/MIN: at 12:05

## 2022-04-04 RX ADMIN — OXYCODONE AND ACETAMINOPHEN 1 TABLET: 5; 325 TABLET ORAL at 00:40

## 2022-04-04 RX ADMIN — TAZOBACTAM SODIUM AND PIPERACILLIN SODIUM 3.38 G: 375; 3 INJECTION, SOLUTION INTRAVENOUS at 03:41

## 2022-04-04 RX ADMIN — SODIUM CHLORIDE 30 ML/HR: 9 INJECTION, SOLUTION INTRAVENOUS at 11:10

## 2022-04-04 RX ADMIN — PROPOFOL 150 MG: 10 INJECTION, EMULSION INTRAVENOUS at 12:04

## 2022-04-04 RX ADMIN — POTASSIUM CHLORIDE, DEXTROSE MONOHYDRATE AND SODIUM CHLORIDE 100 ML/HR: 150; 5; 450 INJECTION, SOLUTION INTRAVENOUS at 06:31

## 2022-04-04 RX ADMIN — FAMOTIDINE 20 MG: 20 TABLET ORAL at 08:15

## 2022-04-04 NOTE — PROGRESS NOTES
Case Management Discharge Note      Final Note: Home         Selected Continued Care - Admitted Since 4/3/2022     Destination    No services have been selected for the patient.              Durable Medical Equipment    No services have been selected for the patient.              Dialysis/Infusion    No services have been selected for the patient.              Home Medical Care    No services have been selected for the patient.              Therapy    No services have been selected for the patient.              Community Resources    No services have been selected for the patient.              Community & DME    No services have been selected for the patient.                  Transportation Services  Private: Car    Final Discharge Disposition Code: 01 - home or self-care

## 2022-04-04 NOTE — OUTREACH NOTE
Prep Survey    Flowsheet Row Responses   [facility] facility patient discharged from? [facility]   Is LACE score < 7 ? No   Emergency Room discharge w/ pulse ox? No   Eligibility [facility]   Date of Admission 04/03/22   Date of Discharge 04/04/22   Discharge Disposition Home or Self Care   Discharge diagnosis Lap kvng, ERCP & balloon sweep   Does the patient have one of the following disease processes/diagnoses(primary or secondary)? General Surgery   Does the patient have Home health ordered? No   Is there a DME ordered? No   Prep survey completed? Yes          RIYA SARMIENTO - Registered Nurse

## 2022-04-04 NOTE — BRIEF OP NOTE
ENDOSCOPIC RETROGRADE CHOLANGIOPANCREATOGRAPHY  Progress Note    Roscoe Arias  4/4/2022    Pre-op Diagnosis:   Choledocholithiasis with acute cholecystitis with obstruction [K80.43]       Post-Op Diagnosis Codes:     * Choledocholithiasis with acute cholecystitis with obstruction [K80.43]    Procedure/CPT® Codes:        Procedure(s):  ENDOSCOPIC RETROGRADE CHOLANGIOPANCREATOGRAPHY WITH SPHINCTEROTOMY AND BALLOON SWEEP    Surgeon(s):  Colin Godwin MD    Anesthesia: Monitored Anesthesia Care    Staff:   Endo Technician: Joellen Grubbs; Vesna Pedro  Endo Nurse: Swati Zuniga RN; Riddhi Canela RN         Estimated Blood Loss: minimal    Urine Voided: * No values recorded between 4/4/2022 11:58 AM and 4/4/2022 12:14 PM *    Specimens:                None          Drains: * No LDAs found *    Findings: ERCP with sphincterotomy balloon stone extraction performed revealed sludge in the distal duct with no distinct stones noted.  Biliary tree passed with 12 mm balloon after sphincterotomy performed.  No further stones were noted and no extravasation of contrast were seen.    Complications: None          Colin Godwin MD     Date: 4/4/2022  Time: 12:18 EDT

## 2022-04-04 NOTE — PROGRESS NOTES
Discharge Planning Assessment  [facility]     Patient Name: Roscoe Arias  MRN: [id number]  Today's Date: 4/4/2022    Admit Date: 4/3/2022     Discharge Needs Assessment     Row Name 04/04/22 1524       Living Environment    People in Home significant other    Name(s) of People in Home Winnie JENSEN    Current Living Arrangements home    Primary Care Provided by self    Provides Primary Care For no one    Family Caregiver if Needed parent(s)    Family Caregiver Names mother, Stephanie    Quality of Family Relationships helpful;involved;supportive    Able to Return to Prior Arrangements yes       Resource/Environmental Concerns    Resource/Environmental Concerns none       Transition Planning    Patient/Family Anticipates Transition to home with family    Patient/Family Anticipated Services at Transition none    Transportation Anticipated family or friend will provide       Discharge Needs Assessment    Equipment Currently Used at Home none    Concerns to be Addressed no discharge needs identified;denies needs/concerns at this time    Discharge Coordination/Progress Home               Discharge Plan     Row Name 04/04/22 1526       Plan    Plan Home with SO    Patient/Family in Agreement with Plan yes    Plan Comments CCP met with pt at bedside to verify information and discuss d/c planning. Pt resides with his SO, uses no DME, and denies past HH/SNF. Pt reports [employer] insurance via his employer, CCP requested copy to verify and apply to his inpatient bills. Pt denies needs and is enrolled in Meds to Beds for d/c. Dolly Reynolds LCSW              Continued Care and Services - Admitted Since 4/3/2022    Coordination has not been started for this encounter.       Expected Discharge Date and Time     Expected Discharge Date Expected Discharge Time    Apr 4, 2022          Demographic Summary     Row Name 04/04/22 1524       General Information    Admission Type inpatient    Arrived From home    Referral Source admission list     Reason for Consult discharge planning    Preferred Language English               Functional Status     Row Name 04/04/22 1524       Functional Status    Usual Activity Tolerance good    Current Activity Tolerance good       Functional Status, IADL    Medications independent    Meal Preparation independent    Housekeeping independent    Laundry independent    Shopping independent       Mental Status Summary    Recent Changes in Mental Status/Cognitive Functioning no changes               Psychosocial    No documentation.                Abuse/Neglect    No documentation.                Legal    No documentation.                Substance Abuse    No documentation.                Patient Forms    No documentation.                   Lisa Reynolds LCSW

## 2022-04-04 NOTE — CONSULTS
[facility]  Initial Inpatient Consult Note    Referring Provider: Dr. Maddi Peter    Reason for Consultation: Common bile duct stones    Subjective     History of present illness:    31 y.o. male with history of hypertension who presented with right upper quadrant pain rating to the back and shoulder.  Patient reported after eating Mexican food with a history of previous gallbladder attack in November.  Emergency room patient with right upper quadrant pain CT with changes of acute cholecystitis ultrasound confirmed stones and sludge in the gallbladder.  LFTs showed mild elevation in transaminase and bilirubin patient went for cholecystectomy.  Intra-Op cholangiogram revealed obstruction of the distal CBD with no injection into the duodenum consistent with stones.  Patient referred now for evaluation for ERCP.  Patient currently with only incisional discomfort otherwise feeling well.    Past Medical History:  Past Medical History:   Diagnosis Date   • Hypertension      Past Surgical History:  Past Surgical History:   Procedure Laterality Date   • GASTRIC SLEEVE LAPAROSCOPIC  11/08/2021    in [place]      Social History:   Social History     Tobacco Use   • Smoking status: Former Smoker     Years: 2.00     Quit date: 6/1/2011     Years since quitting: 10.8   • Smokeless tobacco: Former User     Types: Chew   Substance Use Topics   • Alcohol use: Yes     Comment: 2-3X WEEKLY      Family History:  Family History   Problem Relation Age of Onset   • Obesity Mother    • Hypertension Mother    • Sleep apnea Mother    • Obesity Sister    • Hypertension Sister    • Diabetes Maternal Grandmother    • Hypertension Maternal Grandmother    • Obesity Maternal Grandfather    • Hypertension Maternal Grandfather    • Diabetes Paternal Grandmother    • Hypertension Paternal Grandmother    • Cancer Paternal Grandfather    • Malig Hyperthermia Neg Hx        Home Meds:  Medications Prior to Admission    Medication Sig Dispense Refill Last Dose   • metoprolol succinate XL (Toprol XL) 100 MG 24 hr tablet Take 1 tablet by mouth Daily. 90 tablet 0 4/2/2022 at Unknown time   • vitamin D3 (Vitamin D) 125 MCG (5000 UT) capsule capsule Take 1 capsule by mouth Daily. 90 capsule 3 4/2/2022 at Unknown time   • chlorthalidone (HYGROTON) 25 MG tablet Take 1 tablet by mouth Daily. 90 tablet 0 More than a month at Unknown time   • famotidine (Pepcid) 20 MG tablet Take 1 tablet by mouth 2 (Two) Times a Day. 90 tablet 0 More than a month at Unknown time     Current Meds:   famotidine, 20 mg, Oral, BID  metoprolol succinate XL, 100 mg, Oral, Daily  piperacillin-tazobactam, 3.375 g, Intravenous, Q8H  sodium chloride, 10 mL, Intravenous, Q12H      Allergies:  No Known Allergies  Review of Systems  All systems were reviewed and negative except for:  Gastrointestinal: positive for  pain     Objective     Vital Signs  Temp:  [96.7 °F (35.9 °C)-98.3 °F (36.8 °C)] 97.4 °F (36.3 °C)  Heart Rate:  [42-75] 48  Resp:  [18-20] 18  BP: (123-167)/() 150/102  Physical Exam:  General Appearance:    Alert, cooperative, in no acute distress   Head:    Normocephalic, without obvious abnormality, atraumatic   Eyes:          conjunctivae and sclerae normal, no   icterus   Throat:   no thrush, oral mucosa moist   Neck:   Supple, no adenopathy   Lungs:     Clear to auscultation bilaterally    Heart:    Regular rhythm and normal rate    Chest Wall:    No abnormalities observed   Abdomen:     Soft, nondistended, nontender; normal bowel sounds   Extremities:   no edema, no redness   Skin:   No bruising or rash   Psychiatric:  normal mood and insight     Results Review:   I reviewed the patient's new clinical results.  I reviewed the patient's new imaging results and agree with the interpretation.    Results from last 7 days   Lab Units 04/03/22  0408   WBC 10*3/mm3 7.40   HEMOGLOBIN g/dL 16.3   HEMATOCRIT % 45.4   PLATELETS 10*3/mm3 202     Results  from last 7 days   Lab Units 04/03/22  0408   SODIUM mmol/L 141   POTASSIUM mmol/L 3.7   CHLORIDE mmol/L 102   CO2 mmol/L 26.1   BUN mg/dL 6   CREATININE mg/dL 0.85   CALCIUM mg/dL 9.9   BILIRUBIN mg/dL 1.7*   ALK PHOS U/L 87   ALT (SGPT) U/L 106*   AST (SGOT) U/L 164*   GLUCOSE mg/dL 111*         Lab Results   Lab Value Date/Time    LIPASE 22 04/03/2022 0408       Radiology:  [place] Cholangiogram Operative   Final Result   Distal common duct obstruction, most likely due to   choledocholithiasis, though further evaluation recommended. Mild   extravasation of contrast at the injection site.       FLUOROSCOPY TIME: 24.6 seconds, 156 images.       This report was finalized on 4/3/2022 3:25 PM by Dr. Darrell Clemons M.D.          US Gallbladder   Final Result       1. Stones and sludge are seen within the gallbladder, and there is mild   gallbladder wall thickening, measuring up to 4 mm. Prior study suggested   some gallbladder wall edema as well as some pericholecystic stranding.   This is not well seen on the current study, but I think findings remain   concerning for acute cholecystitis. If clinical doubt persists,   consideration for HIDA scan is recommended.   2. Hepatomegaly and diffuse hepatic steatosis.       This report was finalized on 4/3/2022 5:51 AM by Dr. Mine Abreu M.D.          CT Abdomen Pelvis With Contrast   Final Result       1. There is some stranding around the patient's gallbladder, as well as   a suggestion of some gallbladder wall edema. Appearance is concerning   for acute cholecystitis. Ultrasound would be confirmatory.       Radiation dose reduction techniques were utilized, including automated   exposure control and exposure modulation based on body size.       This report was finalized on 4/3/2022 5:41 AM by Dr. Mine Abreu M.D.          FL ercp biliary duct only    (Results Pending)       Assessment/Plan   Patient Active Problem List   Diagnosis   • Chronic foot pain, left    • Achilles tendon contracture due to non-neurologic cause   • Essential hypertension   • Class 3 severe obesity due to excess calories with serious comorbidity and body mass index (BMI) of 45.0 to 49.9 in adult (HCC)   • Sleep apnea   • Gout   • Acute calculous cholecystitis   • Choledocholithiasis with acute cholecystitis with obstruction       Assessment:  1. Abnormal Intra-Op cholangiogram  2. Common bile duct stones  3. Hypertension  4. Obstructive sleep apnea    Plan:  · Agree with need for ERCP to clear the biliary tree.  Risks and benefits discussed with the patient who agreed to proceed.  All questions were answered.      I discussed the patients findings and my recommendations with patient.    Colin Godwin MD

## 2022-04-04 NOTE — PLAN OF CARE
Problem: Adult Inpatient Plan of Care  Goal: Plan of Care Review  Outcome: Met  Goal: Patient-Specific Goal (Individualized)  Outcome: Met  Goal: Absence of Hospital-Acquired Illness or Injury  Outcome: Met  Intervention: Identify and Manage Fall Risk  Recent Flowsheet Documentation  Taken 4/4/2022 1425 by Kayla Fay RN  Safety Promotion/Fall Prevention: safety round/check completed  Taken 4/4/2022 1320 by Kayla Fay RN  Safety Promotion/Fall Prevention: safety round/check completed  Taken 4/4/2022 1217 by Kayla Fay RN  Safety Promotion/Fall Prevention: patient off unit  Taken 4/4/2022 1200 by Kayla Fay RN  Safety Promotion/Fall Prevention: patient off unit  Taken 4/4/2022 0956 by Kayla Fay RN  Safety Promotion/Fall Prevention: safety round/check completed  Taken 4/4/2022 0815 by Kayla Fay RN  Safety Promotion/Fall Prevention: safety round/check completed  Intervention: Prevent Skin Injury  Recent Flowsheet Documentation  Taken 4/4/2022 0815 by Kayla Fay RN  Body Position: position changed independently  Intervention: Prevent and Manage VTE (Venous Thromboembolism) Risk  Recent Flowsheet Documentation  Taken 4/4/2022 0815 by Kayla Fay RN  Activity Management: activity adjusted per tolerance  VTE Prevention/Management:   bilateral   sequential compression devices on  Goal: Optimal Comfort and Wellbeing  Outcome: Met  Intervention: Provide Person-Centered Care  Recent Flowsheet Documentation  Taken 4/4/2022 0815 by Kayla Fay RN  Trust Relationship/Rapport: care explained  Goal: Readiness for Transition of Care  Outcome: Met   Goal Outcome Evaluation:

## 2022-04-04 NOTE — PROGRESS NOTES
"General Surgery  Progress Note    CC: Follow-up acute calculus cholecystitis    POD#1 laparoscopic cholecystectomy with cholangiogram    S: He feels well today following cholecystectomy yesterday and denies any nausea, vomiting, or significant abdominal pain.  He is resting comfortably in bed.    O:/75 (BP Location: Left arm, Patient Position: Lying)   Pulse (!) 49   Temp 97.4 °F (36.3 °C) (Oral)   Resp 18   Ht 190.5 cm (75\")   Wt (!) 138 kg (305 lb)   SpO2 98%   BMI 38.12 kg/m²     GENERAL: alert, well appearing, and in no distress  HEENT: normocephalic, atraumatic, moist mucous membranes, clear sclerae   CHEST: clear to auscultation, no wheezes, rales or rhonchi, symmetric air entry  CARDIAC: regular rate and rhythm    ABDOMEN: Soft, nondistended, incisions clean/dry/intact with glue  EXTREMITIES: no cyanosis, clubbing, or edema   SKIN: Warm and moist, no rashes    LABS  Results from last 7 days   Lab Units 04/03/22  0408   WBC 10*3/mm3 7.40   HEMOGLOBIN g/dL 16.3   HEMATOCRIT % 45.4   PLATELETS 10*3/mm3 202     Results from last 7 days   Lab Units 04/03/22  0408   SODIUM mmol/L 141   POTASSIUM mmol/L 3.7   CHLORIDE mmol/L 102   CO2 mmol/L 26.1   BUN mg/dL 6   CREATININE mg/dL 0.85   CALCIUM mg/dL 9.9   BILIRUBIN mg/dL 1.7*   ALK PHOS U/L 87   ALT (SGPT) U/L 106*   AST (SGOT) U/L 164*   GLUCOSE mg/dL 111*             A/P: 31 y.o. male POD#1 laparoscopic cholecystectomy with cholangiogram    Plan for ERCP today for evacuation of his common bile duct stones.  From my standpoint, he can likely be discharged home later this evening if he is able to tolerate a regular diet following his ERCP.    Maddi Peter MD  General, Robotic, and Endoscopic Surgery  [facility]    [address], [address]  [place] [address]  P: [phone]  F: [phone]     "

## 2022-04-04 NOTE — PLAN OF CARE
Goal Outcome Evaluation:  Plan of Care Reviewed With: patient        Progress: no change  Outcome Evaluation: Denies acute pain or nausea. He describes pain as soreness at incision sites. Denies need for pain meds for this discomfort but also c/o H/A and took percocet 5mg for that. He appeared to rest well after medicated. NPO after MN. Dr Godwin to see in consult for possible ERCP. No consent ordered at this time. Pt hasen't been seen yet by Dr Godwin

## 2022-04-04 NOTE — ANESTHESIA PREPROCEDURE EVALUATION
Anesthesia Evaluation     Patient summary reviewed   NPO Solid Status: > 8 hours  NPO Liquid Status: > 2 hours           Airway   Mallampati: III  TM distance: >3 FB  Neck ROM: full  Large neck circumference and Possible difficult intubation  Dental      Pulmonary     breath sounds clear to auscultation  (+) a smoker Former, sleep apnea on CPAP,   (-) shortness of breath  Cardiovascular   Exercise tolerance: good (4-7 METS)    Rhythm: regular  Rate: normal    (+) hypertension,   (-) angina, SARAVIA      Neuro/Psych  GI/Hepatic/Renal/Endo    (+) obesity,       Musculoskeletal     Abdominal    Substance History      OB/GYN          Other                      Anesthesia Plan    ASA 3     MAC   (MAC anesthesia discussed with patient and/or patient representative. Risks (including but not limited to intra-op awareness), benefits, and alternatives were discussed. Understanding was voiced with an agreement to proceed with a MAC technique and General as a backup option. I have explained other risks of anesthesia including but not limited to dental damage, corneal abrasion, nerve injury, MI, stroke, and death. All questions asked and answered.   )  intravenous induction     Anesthetic plan, all risks, benefits, and alternatives have been provided, discussed and informed consent has been obtained with: patient.        CODE STATUS:    Level Of Support Discussed With: Patient  Code Status (Patient has no pulse and is not breathing): CPR (Attempt to Resuscitate)  Medical Interventions (Patient has pulse or is breathing): Full Support

## 2022-04-04 NOTE — ANESTHESIA POSTPROCEDURE EVALUATION
"Patient: Roscoe Arias    Procedure Summary     Date: 04/04/22 Room / Location:  [facility]    Anesthesia Start: 1158 Anesthesia Stop: 1229    Procedure: ENDOSCOPIC RETROGRADE CHOLANGIOPANCREATOGRAPHY WITH SPHINCTEROTOMY AND BALLOON SWEEP (N/A ) Diagnosis:       Choledocholithiasis with acute cholecystitis with obstruction      (Choledocholithiasis with acute cholecystitis with obstruction [K80.43])    Surgeons: Colin Godwin MD Provider: Eleazar Bradshaw MD    Anesthesia Type: MAC ASA Status: 3          Anesthesia Type: MAC    Vitals  Vitals Value Taken Time   /100 04/04/22 1244   Temp     Pulse 58 04/04/22 1244   Resp 16 04/04/22 1244   SpO2 98 % 04/04/22 1244           Post Anesthesia Care and Evaluation    Patient location during evaluation: bedside  Patient participation: complete - patient participated  Level of consciousness: awake and alert  Pain management: adequate  Airway patency: patent  Anesthetic complications: No anesthetic complications    Cardiovascular status: acceptable  Respiratory status: acceptable  Hydration status: acceptable    Comments: /100 (BP Location: Left arm, Patient Position: Sitting)   Pulse 58   Temp 36.3 °C (97.4 °F) (Oral)   Resp 16   Ht 190.5 cm (75\")   Wt (!) 138 kg (305 lb)   SpO2 98%   BMI 38.12 kg/m²       "

## 2022-04-05 ENCOUNTER — TRANSITIONAL CARE MANAGEMENT TELEPHONE ENCOUNTER (OUTPATIENT)
Dept: CALL CENTER | Facility: HOSPITAL | Age: 32
End: 2022-04-05

## 2022-04-05 LAB
LAB AP CASE REPORT: NORMAL
PATH REPORT.FINAL DX SPEC: NORMAL
PATH REPORT.GROSS SPEC: NORMAL

## 2022-04-05 NOTE — DISCHARGE SUMMARY
Discharge Summary    Patient name: Roscoe Arias    Medical record number: [id number]    Admission date: 4/3/2022  Discharge date:  4/4/2022    Attending physician: Maddi Peter MD    Primary care physician: Tanya Burton APRN    Consulting physician(s): Dr. Albin Godwin (gastroenterology for ERCP)    Condition on discharge: improving    Admitting diagnosis: Acute calculus cholecystitis    Final diagnosis: Acute calculus cholecystitis with choledocholithiasis and common bile duct obstruction    Procedures:   Laparoscopic cholecystectomy with cholangiogram (4/3/2022)  ERCP with common bile duct stone/sludge extraction (4/4/2022    History of present illness: The patient is a  31 y.o. male that was admitted to the hospital with acute onset right upper quadrant abdominal pain radiating to his back and shoulder blade.  He came to the emergency room where a CT abdomen/pelvis and gallbladder ultrasound confirmed what appeared to be acute calculus cholecystitis.  His LFTs and bilirubin level were also elevated, suggestive of potential choledocholithiasis although there was no imaging suggestion of this.  He was admitted to [facility] and started on empiric antibiotics.    Hospital course: He was evaluated in the emergency room and diagnosed with acute calculus cholecystitis.  He was tested empirically for COVID-19 which returned negative.  He was admitted to [facility] for observation and started on Zosyn.  I recommended proceeding to the operating room for laparoscopic cholecystectomy with cholangiogram.  This was achieved without any difficulty, and the cholangiogram showed distal common bile duct obstruction from impacted sludge.  Dr. Albin Godwin with the gastroenterology service was then consulted and performed an ERCP the next day with clearance of the common bile duct sludge.  Following the ERCP, his diet was advanced which he tolerated well.  He is being discharged home with instructions to  follow-up with me in 2 weeks.    Discharge medications:      Discharge Medications      New Medications      Instructions Start Date   oxyCODONE-acetaminophen 5-325 MG per tablet  Commonly known as: PERCOCET   1 tablet, Oral, Every 4 Hours PRN         Continue These Medications      Instructions Start Date   famotidine 20 MG tablet  Commonly known as: Pepcid   20 mg, Oral, 2 Times Daily      metoprolol succinate  MG 24 hr tablet  Commonly known as: Toprol XL   100 mg, Oral, Daily      vitamin D3 125 MCG (5000 UT) capsule capsule   5,000 Units, Oral, Daily         Stop These Medications    chlorthalidone 25 MG tablet  Commonly known as: HYGROTON            Discharge instructions:    - Resume regular diet as tolerated.  - No specific activity restrictions post-op. You may resume all activities as tolerated once your pain level allows.  - No driving while taking narcotic pain medications.  - You may resume showering, no tub bathing for two weeks while your incisions heal.  - Wash your incisions with soap and water daily in the shower, pat dry, and leave open to air.    Follow-up appointment: Follow up with Maddi Peter MD in the office in 2 weeks. Call for appointment at [phone].  No follow-up with Dr. Godwin will be necessary.    CODE STATUS: Full code

## 2022-04-05 NOTE — OUTREACH NOTE
Call Center TCM Note    Flowsheet Row Responses   [facility] patient discharged from? [place]   Does the patient have one of the following disease processes/diagnoses(primary or secondary)? General Surgery   TCM attempt successful? Yes   Call start time 1435   Call end time 1443   Discharge diagnosis Lap kvng, ERCP & balloon sweep   Meds reviewed with patient/caregiver? Yes   Is the patient having any side effects they believe may be caused by any medication additions or changes? No   Does the patient have all medications related to this admission filled (includes all antibiotics, pain medications, etc.) Yes   Is the patient taking all medications as directed (includes completed medication regime)? Yes   Does the patient have a follow up appointment scheduled with their surgeon? No   What is preventing the patient from scheduling follow up appointments? Haven't had time   Nursing Interventions Advised patient to make appointment   Urgent appointment interventions Facilitated patient appointment   Has the patient kept scheduled appointments due by today? N/A   Comments Appt made with Tanya THORPE for 4/12 @ 1:30   Psychosocial issues? No   Did the patient receive a copy of their discharge instructions? Yes   Nursing interventions Reviewed instructions with patient   What is the patient's perception of their health status since discharge? Improving   Nursing interventions Nurse provided patient education   Is the patient /caregiver able to teach back basic post-op care? Practice 'cough and deep breath', Drive as instructed by MD in discharge instructions, Keep incision areas clean,dry and protected, Do not remove steri-strips, Lifting as instructed by MD in discharge instructions   Is the patient/caregiver able to teach back signs and symptoms of incisional infection? Increased redness, swelling or pain at the incisonal site, Increased drainage or bleeding, Incisional warmth, Pus or odor from  incision, Fever   Is the patient/caregiver able to teach back steps to recovery at home? Set small, achievable goals for return to baseline health, Rest and rebuild strength, gradually increase activity, Eat a well-balance diet, Make a list of questions for surgeon's appointment   If the patient is a current smoker, are they able to teach back resources for cessation? Not a smoker   Is the patient/caregiver able to teach back the hierarchy of who to call/visit for symptoms/problems? PCP, Specialist, Home health nurse, Urgent Care, ED, 911 Yes   Additional teach back comments Staets he is doing well.  He still needs to make follow up with surgeon and is going to call today.   TCM call completed? Yes   Wrap up additional comments Denies questions or needs at this time.            Stephanie Azar LPN    4/5/2022, 14:45 EDT

## 2022-04-15 ENCOUNTER — OFFICE VISIT (OUTPATIENT)
Dept: SURGERY | Facility: CLINIC | Age: 32
End: 2022-04-15

## 2022-04-15 ENCOUNTER — OFFICE VISIT (OUTPATIENT)
Dept: FAMILY MEDICINE CLINIC | Facility: CLINIC | Age: 32
End: 2022-04-15

## 2022-04-15 VITALS
WEIGHT: 310 LBS | SYSTOLIC BLOOD PRESSURE: 134 MMHG | OXYGEN SATURATION: 98 % | HEIGHT: 75 IN | BODY MASS INDEX: 38.54 KG/M2 | TEMPERATURE: 97.3 F | HEART RATE: 80 BPM | DIASTOLIC BLOOD PRESSURE: 86 MMHG

## 2022-04-15 DIAGNOSIS — K80.43 CHOLEDOCHOLITHIASIS WITH ACUTE CHOLECYSTITIS WITH OBSTRUCTION: Primary | ICD-10-CM

## 2022-04-15 DIAGNOSIS — R79.89 ELEVATED LFTS: ICD-10-CM

## 2022-04-15 DIAGNOSIS — K80.00 ACUTE CALCULOUS CHOLECYSTITIS: ICD-10-CM

## 2022-04-15 DIAGNOSIS — K80.43 CHOLEDOCHOLITHIASIS WITH ACUTE CHOLECYSTITIS WITH OBSTRUCTION: ICD-10-CM

## 2022-04-15 DIAGNOSIS — Z09 SURGICAL FOLLOWUP: Primary | ICD-10-CM

## 2022-04-15 PROCEDURE — 99024 POSTOP FOLLOW-UP VISIT: CPT | Performed by: SURGERY

## 2022-04-15 PROCEDURE — 99213 OFFICE O/P EST LOW 20 MIN: CPT | Performed by: NURSE PRACTITIONER

## 2022-04-15 NOTE — PROGRESS NOTES
"Beau Arias is a 31 y.o. male. Patient here for hospital follow up from gall bladder removed.    History of Present Illness   Incision sites a little tender, but otherwise doing much better. Did have similar attack at [facility]. Episode occurred on a Saturday night.  Surgeon release to work this morning, he had already been back to work. No post gallbladder diarrhea  The following portions of the patient's history were reviewed and updated as appropriate: allergies, current medications, past family history, past medical history, past social history, past surgical history and problem list.    Review of Systems   Constitutional: Negative for chills, fever, unexpected weight gain and unexpected weight loss.   HENT: Negative.    Respiratory: Negative.    Gastrointestinal: Negative for abdominal pain, diarrhea, nausea and vomiting.   Endocrine: Negative.    Musculoskeletal: Negative.    Skin: Positive for wound (incisions healing mild tender to touch).   Allergic/Immunologic: Negative.    Neurological: Negative for weakness.   Hematological: Negative.    Psychiatric/Behavioral: Negative for self-injury, suicidal ideas, depressed mood and stress. The patient is not nervous/anxious.      /86   Pulse 80   Temp 97.3 °F (36.3 °C) (Infrared)   Ht 190.5 cm (75\")   Wt (!) 141 kg (310 lb)   SpO2 98%   BMI 38.75 kg/m²     Objective   Physical Exam  Vitals and nursing note reviewed.   Constitutional:       General: He is not in acute distress.     Appearance: He is well-developed. He is obese. He is not ill-appearing or diaphoretic.   Neck:      Thyroid: No thyromegaly.   Cardiovascular:      Rate and Rhythm: Normal rate and regular rhythm.      Heart sounds: Normal heart sounds.   Pulmonary:      Effort: Pulmonary effort is normal.      Breath sounds: Normal breath sounds.   Abdominal:      General: There is no distension.   Musculoskeletal:      Cervical back: Normal range of motion and neck supple. "   Neurological:      Mental Status: He is alert.   Psychiatric:         Behavior: Behavior normal.         Thought Content: Thought content normal.         Judgment: Judgment normal.       Assessment/Plan   Problems Addressed this Visit        Gastrointestinal Abdominal     Choledocholithiasis with acute cholecystitis with obstruction - Primary    Relevant Orders    Comprehensive Metabolic Panel      Other Visit Diagnoses     Elevated LFTs          Diagnoses       Codes Comments    Choledocholithiasis with acute cholecystitis with obstruction    -  Primary ICD-10-CM: K80.43  ICD-9-CM: 574.31     Elevated LFTs     ICD-10-CM: R79.89  ICD-9-CM: 790.6         If develops post cholecystectomy diarrhea to call  Elevated LFTs--likely secondary to obstruction, recheck CMP    This document is intended for medical expert use only. Reading of this document by patients and/or patient's family without participating medical staff guidance may result in misinterpretation and unintended morbidity.  Any interpretation of such data is the responsibility of the patient and/or family member responsible for the patient in concert with their primary or specialist providers, not to be left for sources of online searches such as [employer], [employer] or similar queries. Relying on these approaches to knowledge may result in misinterpretation, misguided goals of care and even death should patients or family members try recommendations outside of the realm of professional medical care in a supervised way.    Please allow 3-5 business days for recommendations based on new results    Go to the ER for any possible lifethreatening symptoms such as chest pain or shortness of air.

## 2022-04-15 NOTE — PROGRESS NOTES
CHIEF COMPLAINT:   Chief Complaint   Patient presents with   • Post-op       HISTORY OF PRESENT ILLNESS:  This is a 31 y.o. male who presents for a post-operative visit after undergoing laparoscopic cholecystectomy with cholangiogram on 4/3/2022.  He required ERCP the next day for common bile duct sludge removal.  He was discharged home shortly thereafter.  He has been doing very well since surgery and only took pain medication for 1 to 2 days after his discharge home.  He has been back to work full-time as a .  He denies any fever, chills, jaundice, scleral icterus, or diarrhea.    Pathology:    Gallbladder, Cholecystectomy:  Benign gallbladder with                A. Cholelithiasis  B. Cholesterolosis.  C. Mild chronic cholecystitis.    PHYSICAL EXAM:  Lungs: Clear  Heart: RRR  ABD: Incisions are healing well without any erythema or signs of infection.  Ext: no significant edema, calves nontender    A/P:  This is a 31 y.o. male patient who is S/P laparoscopic cholecystectomy with cholangiogram on 4/3/2022 followed by ERCP the next day    He is healing beautifully.  I discussed the benign pathology findings with him.  He can follow-up with me on an as-needed basis, and has no restrictions on activity, diet, bathing, etc. moving forward.    Maddi Peter MD  General, Robotic, and Endoscopic Surgery  [facility]    [address], [address]  [place] [address]  P: [phone]  F: [phone]

## 2022-04-16 LAB
ALBUMIN SERPL-MCNC: 4.2 G/DL (ref 4–5)
ALBUMIN/GLOB SERPL: 1.7 {RATIO} (ref 1.2–2.2)
ALP SERPL-CCNC: 80 IU/L (ref 44–121)
ALT SERPL-CCNC: 51 IU/L (ref 0–44)
AST SERPL-CCNC: 30 IU/L (ref 0–40)
BILIRUB SERPL-MCNC: 0.7 MG/DL (ref 0–1.2)
BUN SERPL-MCNC: 6 MG/DL (ref 6–20)
BUN/CREAT SERPL: 8 (ref 9–20)
CALCIUM SERPL-MCNC: 9.7 MG/DL (ref 8.7–10.2)
CHLORIDE SERPL-SCNC: 103 MMOL/L (ref 96–106)
CO2 SERPL-SCNC: 25 MMOL/L (ref 20–29)
CREAT SERPL-MCNC: 0.78 MG/DL (ref 0.76–1.27)
EGFRCR SERPLBLD CKD-EPI 2021: 122 ML/MIN/1.73
GLOBULIN SER CALC-MCNC: 2.5 G/DL (ref 1.5–4.5)
GLUCOSE SERPL-MCNC: 79 MG/DL (ref 65–99)
POTASSIUM SERPL-SCNC: 4.4 MMOL/L (ref 3.5–5.2)
PROT SERPL-MCNC: 6.7 G/DL (ref 6–8.5)
SODIUM SERPL-SCNC: 142 MMOL/L (ref 134–144)

## 2022-05-02 ENCOUNTER — TELEPHONE (OUTPATIENT)
Dept: FAMILY MEDICINE CLINIC | Facility: CLINIC | Age: 32
End: 2022-05-02

## 2022-05-02 RX ORDER — INDOMETHACIN 50 MG/1
50 CAPSULE ORAL 3 TIMES DAILY PRN
Qty: 30 CAPSULE | Refills: 0 | Status: SHIPPED | OUTPATIENT
Start: 2022-05-02 | End: 2022-10-19

## 2022-05-02 NOTE — TELEPHONE ENCOUNTER
Caller: Roscoe Arias    Relationship: Self    Best call back number: [phone]    What medication are you requesting: GOUT MEDICINE    What are your current symptoms: GOUT FLARE UP    How long have you been experiencing symptoms: ABOUT A WEEK    Have you had these symptoms before:    [x] Yes  [] No    Have you been treated for these symptoms before:   [x] Yes  [] No    If a prescription is needed, what is your preferred pharmacy and phone number[facility] [address] [address] [facility] [phone] [facility] [phone] [facility]     Additional notes: PATIENT STATES HE USUALLY GETS PRESCRIBED A MEDROL DOSE PACK BUT HE AND VEDA DISCUSSED GETTING ON A DAILY MEDICATION IF THE FLARES CONTINUED.    PLEASE ADVISE

## 2022-10-19 ENCOUNTER — APPOINTMENT (OUTPATIENT)
Dept: CT IMAGING | Facility: HOSPITAL | Age: 32
End: 2022-10-19

## 2022-10-19 ENCOUNTER — APPOINTMENT (OUTPATIENT)
Dept: GENERAL RADIOLOGY | Facility: HOSPITAL | Age: 32
End: 2022-10-19

## 2022-10-19 ENCOUNTER — HOSPITAL ENCOUNTER (EMERGENCY)
Facility: HOSPITAL | Age: 32
Discharge: HOME | End: 2022-10-19
Attending: EMERGENCY MEDICINE | Admitting: EMERGENCY MEDICINE

## 2022-10-19 VITALS
HEIGHT: 75 IN | RESPIRATION RATE: 13 BRPM | TEMPERATURE: 97.6 F | SYSTOLIC BLOOD PRESSURE: 141 MMHG | HEART RATE: 85 BPM | OXYGEN SATURATION: 97 % | BODY MASS INDEX: 38.75 KG/M2 | DIASTOLIC BLOOD PRESSURE: 91 MMHG

## 2022-10-19 DIAGNOSIS — M79.10 MYALGIA: ICD-10-CM

## 2022-10-19 DIAGNOSIS — R51.9 ACUTE NONINTRACTABLE HEADACHE, UNSPECIFIED HEADACHE TYPE: ICD-10-CM

## 2022-10-19 DIAGNOSIS — B34.8 RHINOVIRUS: Primary | ICD-10-CM

## 2022-10-19 LAB
ALBUMIN SERPL-MCNC: 4.3 G/DL (ref 3.5–5.2)
ALBUMIN/GLOB SERPL: 1.4 G/DL
ALP SERPL-CCNC: 68 U/L (ref 39–117)
ALT SERPL W P-5'-P-CCNC: 16 U/L (ref 1–41)
ANION GAP SERPL CALCULATED.3IONS-SCNC: 16.9 MMOL/L (ref 5–15)
APTT PPP: 29.3 SECONDS (ref 22.7–35.4)
AST SERPL-CCNC: 21 U/L (ref 1–40)
B PARAPERT DNA SPEC QL NAA+PROBE: NOT DETECTED
B PERT DNA SPEC QL NAA+PROBE: NOT DETECTED
BASOPHILS # BLD AUTO: 0.03 10*3/MM3 (ref 0–0.2)
BASOPHILS NFR BLD AUTO: 0.4 % (ref 0–1.5)
BILIRUB SERPL-MCNC: 1.3 MG/DL (ref 0–1.2)
BILIRUB UR QL STRIP: NEGATIVE
BUN SERPL-MCNC: 9 MG/DL (ref 6–20)
BUN/CREAT SERPL: 11.7 (ref 7–25)
C PNEUM DNA NPH QL NAA+NON-PROBE: NOT DETECTED
CALCIUM SPEC-SCNC: 9.4 MG/DL (ref 8.6–10.5)
CHLORIDE SERPL-SCNC: 101 MMOL/L (ref 98–107)
CLARITY UR: CLEAR
CO2 SERPL-SCNC: 20.1 MMOL/L (ref 22–29)
COLOR UR: YELLOW
CREAT SERPL-MCNC: 0.77 MG/DL (ref 0.76–1.27)
D DIMER PPP FEU-MCNC: 0.51 MCGFEU/ML (ref 0–0.49)
D-LACTATE SERPL-SCNC: 1.7 MMOL/L (ref 0.5–2)
DEPRECATED RDW RBC AUTO: 40 FL (ref 37–54)
EGFRCR SERPLBLD CKD-EPI 2021: 122 ML/MIN/1.73
EOSINOPHIL # BLD AUTO: 0.12 10*3/MM3 (ref 0–0.4)
EOSINOPHIL NFR BLD AUTO: 1.5 % (ref 0.3–6.2)
ERYTHROCYTE [DISTWIDTH] IN BLOOD BY AUTOMATED COUNT: 13.1 % (ref 12.3–15.4)
FLUAV SUBTYP SPEC NAA+PROBE: NOT DETECTED
FLUBV RNA ISLT QL NAA+PROBE: NOT DETECTED
GLOBULIN UR ELPH-MCNC: 3 GM/DL
GLUCOSE BLDC GLUCOMTR-MCNC: 122 MG/DL (ref 70–130)
GLUCOSE SERPL-MCNC: 118 MG/DL (ref 65–99)
GLUCOSE UR STRIP-MCNC: NEGATIVE MG/DL
HADV DNA SPEC NAA+PROBE: NOT DETECTED
HCOV 229E RNA SPEC QL NAA+PROBE: NOT DETECTED
HCOV HKU1 RNA SPEC QL NAA+PROBE: NOT DETECTED
HCOV NL63 RNA SPEC QL NAA+PROBE: NOT DETECTED
HCOV OC43 RNA SPEC QL NAA+PROBE: NOT DETECTED
HCT VFR BLD AUTO: 45.5 % (ref 37.5–51)
HETEROPH AB SER QL LA: NEGATIVE
HGB BLD-MCNC: 15.5 G/DL (ref 13–17.7)
HGB UR QL STRIP.AUTO: NEGATIVE
HMPV RNA NPH QL NAA+NON-PROBE: NOT DETECTED
HOLD SPECIMEN: NORMAL
HOLD SPECIMEN: NORMAL
HPIV1 RNA ISLT QL NAA+PROBE: NOT DETECTED
HPIV2 RNA SPEC QL NAA+PROBE: NOT DETECTED
HPIV3 RNA NPH QL NAA+PROBE: NOT DETECTED
HPIV4 P GENE NPH QL NAA+PROBE: NOT DETECTED
IMM GRANULOCYTES # BLD AUTO: 0.01 10*3/MM3 (ref 0–0.05)
IMM GRANULOCYTES NFR BLD AUTO: 0.1 % (ref 0–0.5)
INR PPP: 1.01 (ref 0.9–1.1)
KETONES UR QL STRIP: ABNORMAL
LEUKOCYTE ESTERASE UR QL STRIP.AUTO: NEGATIVE
LIPASE SERPL-CCNC: 14 U/L (ref 13–60)
LYMPHOCYTES # BLD AUTO: 0.83 10*3/MM3 (ref 0.7–3.1)
LYMPHOCYTES NFR BLD AUTO: 10.1 % (ref 19.6–45.3)
M PNEUMO IGG SER IA-ACNC: NOT DETECTED
MCH RBC QN AUTO: 28.8 PG (ref 26.6–33)
MCHC RBC AUTO-ENTMCNC: 34.1 G/DL (ref 31.5–35.7)
MCV RBC AUTO: 84.6 FL (ref 79–97)
MONOCYTES # BLD AUTO: 0.81 10*3/MM3 (ref 0.1–0.9)
MONOCYTES NFR BLD AUTO: 9.9 % (ref 5–12)
NEUTROPHILS NFR BLD AUTO: 6.39 10*3/MM3 (ref 1.7–7)
NEUTROPHILS NFR BLD AUTO: 78 % (ref 42.7–76)
NITRITE UR QL STRIP: NEGATIVE
NRBC BLD AUTO-RTO: 0 /100 WBC (ref 0–0.2)
NT-PROBNP SERPL-MCNC: 31.5 PG/ML (ref 0–450)
PH UR STRIP.AUTO: 7.5 [PH] (ref 5–8)
PLATELET # BLD AUTO: 200 10*3/MM3 (ref 140–450)
PMV BLD AUTO: 11.8 FL (ref 6–12)
POTASSIUM SERPL-SCNC: 3.7 MMOL/L (ref 3.5–5.2)
PROCALCITONIN SERPL-MCNC: 0.06 NG/ML (ref 0–0.25)
PROT SERPL-MCNC: 7.3 G/DL (ref 6–8.5)
PROT UR QL STRIP: NEGATIVE
PROTHROMBIN TIME: 13.4 SECONDS (ref 11.7–14.2)
QT INTERVAL: 347 MS
RBC # BLD AUTO: 5.38 10*6/MM3 (ref 4.14–5.8)
RHINOVIRUS RNA SPEC NAA+PROBE: DETECTED
RSV RNA NPH QL NAA+NON-PROBE: NOT DETECTED
SARS-COV-2 RNA NPH QL NAA+NON-PROBE: NOT DETECTED
SODIUM SERPL-SCNC: 138 MMOL/L (ref 136–145)
SP GR UR STRIP: 1.02 (ref 1–1.03)
TROPONIN T SERPL-MCNC: <0.01 NG/ML (ref 0–0.03)
TROPONIN T SERPL-MCNC: <0.01 NG/ML (ref 0–0.03)
UROBILINOGEN UR QL STRIP: ABNORMAL
WBC NRBC COR # BLD: 8.19 10*3/MM3 (ref 3.4–10.8)
WHOLE BLOOD HOLD COAG: NORMAL
WHOLE BLOOD HOLD SPECIMEN: NORMAL

## 2022-10-19 PROCEDURE — 82962 GLUCOSE BLOOD TEST: CPT

## 2022-10-19 PROCEDURE — 87040 BLOOD CULTURE FOR BACTERIA: CPT | Performed by: EMERGENCY MEDICINE

## 2022-10-19 PROCEDURE — 81003 URINALYSIS AUTO W/O SCOPE: CPT | Performed by: EMERGENCY MEDICINE

## 2022-10-19 PROCEDURE — 83880 ASSAY OF NATRIURETIC PEPTIDE: CPT | Performed by: EMERGENCY MEDICINE

## 2022-10-19 PROCEDURE — 93005 ELECTROCARDIOGRAM TRACING: CPT | Performed by: EMERGENCY MEDICINE

## 2022-10-19 PROCEDURE — 0 IOPAMIDOL PER 1 ML: Performed by: EMERGENCY MEDICINE

## 2022-10-19 PROCEDURE — 85610 PROTHROMBIN TIME: CPT | Performed by: EMERGENCY MEDICINE

## 2022-10-19 PROCEDURE — 25010000002 KETOROLAC TROMETHAMINE PER 15 MG: Performed by: EMERGENCY MEDICINE

## 2022-10-19 PROCEDURE — 25010000002 ONDANSETRON PER 1 MG: Performed by: EMERGENCY MEDICINE

## 2022-10-19 PROCEDURE — 74176 CT ABD & PELVIS W/O CONTRAST: CPT

## 2022-10-19 PROCEDURE — 71045 X-RAY EXAM CHEST 1 VIEW: CPT

## 2022-10-19 PROCEDURE — 85025 COMPLETE CBC W/AUTO DIFF WBC: CPT | Performed by: EMERGENCY MEDICINE

## 2022-10-19 PROCEDURE — 36415 COLL VENOUS BLD VENIPUNCTURE: CPT

## 2022-10-19 PROCEDURE — 84484 ASSAY OF TROPONIN QUANT: CPT | Performed by: EMERGENCY MEDICINE

## 2022-10-19 PROCEDURE — 71275 CT ANGIOGRAPHY CHEST: CPT

## 2022-10-19 PROCEDURE — 25010000002 MORPHINE PER 10 MG: Performed by: EMERGENCY MEDICINE

## 2022-10-19 PROCEDURE — 85379 FIBRIN DEGRADATION QUANT: CPT | Performed by: EMERGENCY MEDICINE

## 2022-10-19 PROCEDURE — 84145 PROCALCITONIN (PCT): CPT | Performed by: EMERGENCY MEDICINE

## 2022-10-19 PROCEDURE — 80053 COMPREHEN METABOLIC PANEL: CPT | Performed by: EMERGENCY MEDICINE

## 2022-10-19 PROCEDURE — 83690 ASSAY OF LIPASE: CPT | Performed by: EMERGENCY MEDICINE

## 2022-10-19 PROCEDURE — 96375 TX/PRO/DX INJ NEW DRUG ADDON: CPT

## 2022-10-19 PROCEDURE — 93010 ELECTROCARDIOGRAM REPORT: CPT | Performed by: INTERNAL MEDICINE

## 2022-10-19 PROCEDURE — 96374 THER/PROPH/DIAG INJ IV PUSH: CPT

## 2022-10-19 PROCEDURE — 83605 ASSAY OF LACTIC ACID: CPT | Performed by: EMERGENCY MEDICINE

## 2022-10-19 PROCEDURE — 0202U NFCT DS 22 TRGT SARS-COV-2: CPT | Performed by: EMERGENCY MEDICINE

## 2022-10-19 PROCEDURE — 85730 THROMBOPLASTIN TIME PARTIAL: CPT | Performed by: EMERGENCY MEDICINE

## 2022-10-19 PROCEDURE — 99284 EMERGENCY DEPT VISIT MOD MDM: CPT

## 2022-10-19 PROCEDURE — 86308 HETEROPHILE ANTIBODY SCREEN: CPT | Performed by: EMERGENCY MEDICINE

## 2022-10-19 PROCEDURE — 25010000002 METOCLOPRAMIDE PER 10 MG: Performed by: EMERGENCY MEDICINE

## 2022-10-19 PROCEDURE — 25010000002 DIPHENHYDRAMINE PER 50 MG: Performed by: EMERGENCY MEDICINE

## 2022-10-19 RX ORDER — DIPHENHYDRAMINE HYDROCHLORIDE 50 MG/ML
25 INJECTION INTRAMUSCULAR; INTRAVENOUS ONCE
Status: COMPLETED | OUTPATIENT
Start: 2022-10-19 | End: 2022-10-19

## 2022-10-19 RX ORDER — KETOROLAC TROMETHAMINE 15 MG/ML
15 INJECTION, SOLUTION INTRAMUSCULAR; INTRAVENOUS ONCE
Status: COMPLETED | OUTPATIENT
Start: 2022-10-19 | End: 2022-10-19

## 2022-10-19 RX ORDER — NAPROXEN 500 MG/1
500 TABLET ORAL 2 TIMES DAILY PRN
Qty: 20 TABLET | Refills: 0 | Status: SHIPPED | OUTPATIENT
Start: 2022-10-19

## 2022-10-19 RX ORDER — MORPHINE SULFATE 2 MG/ML
2 INJECTION, SOLUTION INTRAMUSCULAR; INTRAVENOUS ONCE
Status: COMPLETED | OUTPATIENT
Start: 2022-10-19 | End: 2022-10-19

## 2022-10-19 RX ORDER — ONDANSETRON 2 MG/ML
4 INJECTION INTRAMUSCULAR; INTRAVENOUS ONCE
Status: COMPLETED | OUTPATIENT
Start: 2022-10-19 | End: 2022-10-19

## 2022-10-19 RX ORDER — METOCLOPRAMIDE HYDROCHLORIDE 5 MG/ML
10 INJECTION INTRAMUSCULAR; INTRAVENOUS ONCE
Status: COMPLETED | OUTPATIENT
Start: 2022-10-19 | End: 2022-10-19

## 2022-10-19 RX ORDER — ONDANSETRON 4 MG/1
4 TABLET, ORALLY DISINTEGRATING ORAL EVERY 8 HOURS PRN
Qty: 9 TABLET | Refills: 0 | Status: SHIPPED | OUTPATIENT
Start: 2022-10-19

## 2022-10-19 RX ORDER — SODIUM CHLORIDE 0.9 % (FLUSH) 0.9 %
10 SYRINGE (ML) INJECTION AS NEEDED
Status: DISCONTINUED | OUTPATIENT
Start: 2022-10-19 | End: 2022-10-19 | Stop reason: HOSPADM

## 2022-10-19 RX ADMIN — DIPHENHYDRAMINE HYDROCHLORIDE 25 MG: 50 INJECTION, SOLUTION INTRAMUSCULAR; INTRAVENOUS at 08:31

## 2022-10-19 RX ADMIN — MORPHINE SULFATE 2 MG: 2 INJECTION, SOLUTION INTRAMUSCULAR; INTRAVENOUS at 03:24

## 2022-10-19 RX ADMIN — ONDANSETRON 4 MG: 2 INJECTION INTRAMUSCULAR; INTRAVENOUS at 03:24

## 2022-10-19 RX ADMIN — SODIUM CHLORIDE 1000 ML: 9 INJECTION, SOLUTION INTRAVENOUS at 03:24

## 2022-10-19 RX ADMIN — IOPAMIDOL 95 ML: 755 INJECTION, SOLUTION INTRAVENOUS at 08:01

## 2022-10-19 RX ADMIN — KETOROLAC TROMETHAMINE 15 MG: 15 INJECTION, SOLUTION INTRAMUSCULAR; INTRAVENOUS at 08:30

## 2022-10-19 RX ADMIN — METOCLOPRAMIDE 10 MG: 5 INJECTION, SOLUTION INTRAMUSCULAR; INTRAVENOUS at 08:30

## 2022-10-19 NOTE — ED TRIAGE NOTES
Patient presents via EMS to ED from [employer] where he works with report of chest pain radiating around into his back. He reports shortness of breath that started with the chest pain around 1am. He reports exposure to a covid+ patient over the weekend.     Patient placed in mask by EMS, triage staff wearing appropriate PPE.

## 2022-10-19 NOTE — ED PROVIDER NOTES
EMERGENCY DEPARTMENT ENCOUNTER    Room Number:  14/14  Date of encounter:  10/19/2022  PCP: Provider, No Known  Historian: Patient      HPI:  Chief Complaint: Nausea vomiting, generalized myalgias  A complete HPI/ROS/PMH/PSH/SH/FH are unobtainable due to: None    Context: Roscoe Arias is a 32 y.o. male who presents to the ED c/o nausea vomiting generalized myalgia all day.  States that he has had subjective fevers and chills today.  States that he developed pain around his chest from his back that started around 1 AM.  Feels like he has difficulty getting a deep breath in.  Denies any cough.      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 10/04/2017   • Essential hypertension 07/02/2020   • Class 3 severe obesity due to excess calories with serious comorbidity and body mass index (BMI) of 45.0 to 49.9 in adult (HCC) 07/02/2020   • Sleep apnea 06/01/2021   • Gout 06/01/2021   • Acute calculous cholecystitis 04/03/2022   • Choledocholithiasis with acute cholecystitis with obstruction 04/03/2022   • Cholecystitis 04/04/2022     Resolved Ambulatory Problems     Diagnosis Date Noted   • Cholecystitis 04/03/2022     Past Medical History:   Diagnosis Date   • Hypertension          PAST SURGICAL HISTORY  Past Surgical History:   Procedure Laterality Date   • CHOLECYSTECTOMY WITH INTRAOPERATIVE CHOLANGIOGRAM N/A 4/3/2022    Procedure: Laparoscopic cholecystectomy with cholangiogram;  Surgeon: Maddi Peter MD;  Location: [facility] MAIN OR;  Service: General;  Laterality: N/A;   • ERCP N/A 4/4/2022    Procedure: ENDOSCOPIC RETROGRADE CHOLANGIOPANCREATOGRAPHY WITH SPHINCTEROTOMY AND BALLOON SWEEP;  Surgeon: Colin Godwin MD;  Location: [facility] ENDOSCOPY;  Service: Gastroenterology;  Laterality: N/A;  PRE- COMMON BILE DUCT STONES  POST- SAME   • GASTRIC SLEEVE LAPAROSCOPIC  11/08/2021     in [place]         FAMILY HISTORY  Family History   Problem Relation Age of Onset   • Obesity Mother    • Hypertension Mother    • Sleep apnea Mother    • Obesity Sister    • Hypertension Sister    • Diabetes Maternal Grandmother    • Hypertension Maternal Grandmother    • Obesity Maternal Grandfather    • Hypertension Maternal Grandfather    • Diabetes Paternal Grandmother    • Hypertension Paternal Grandmother    • Cancer Paternal Grandfather    • Malig Hyperthermia Neg Hx          SOCIAL HISTORY  Social History     Socioeconomic History   • Marital status: Significant Other   Tobacco Use   • Smoking status: Former     Years: 2.00     Types: Cigarettes     Quit date: 2011     Years since quittin.3   • Smokeless tobacco: Former     Types: Chew   Vaping Use   • Vaping Use: Never used   Substance and Sexual Activity   • Alcohol use: Yes     Comment: 2-3X WEEKLY   • Drug use: Never         ALLERGIES  Patient has no known allergies.        REVIEW OF SYSTEMS  Review of Systems     All systems reviewed and negative except for those discussed in HPI.       PHYSICAL EXAM    I have reviewed the triage vital signs and nursing notes.    ED Triage Vitals [10/19/22 0246]   Temp Heart Rate Resp BP SpO2   97.6 °F (36.4 °C) 118 24 146/100 99 %      Temp src Heart Rate Source Patient Position BP Location FiO2 (%)   -- -- -- -- --       Physical Exam  GENERAL: Mild distressed  HENT: nares patent  EYES: no scleral icterus  CV: regular rhythm, regular rate  RESPIRATORY: normal effort microincision bilaterally  ABDOMEN: soft, mild generalized tenderness there is no rebound or guarding  MUSCULOSKELETAL: no deformity  NEURO: alert, moves all extremities, follows commands  SKIN: warm, dry        LAB RESULTS  Recent Results (from the past 24 hour(s))   ECG 12 Lead    Collection Time: 10/19/22  2:52 AM   Result Value Ref Range    QT Interval 347 ms   POC Glucose Once    Collection Time: 10/19/22  3:04 AM    Specimen: Blood   Result  Value Ref Range    Glucose 122 70 - 130 mg/dL   Green Top (Gel)    Collection Time: 10/19/22  3:05 AM   Result Value Ref Range    Extra Tube Hold for add-ons.    Lavender Top    Collection Time: 10/19/22  3:05 AM   Result Value Ref Range    Extra Tube hold for add-on    Gold Top - SST    Collection Time: 10/19/22  3:05 AM   Result Value Ref Range    Extra Tube Hold for add-ons.    Light Blue Top    Collection Time: 10/19/22  3:05 AM   Result Value Ref Range    Extra Tube Hold for add-ons.    Comprehensive Metabolic Panel    Collection Time: 10/19/22  3:05 AM    Specimen: Blood   Result Value Ref Range    Glucose 118 (H) 65 - 99 mg/dL    BUN 9 6 - 20 mg/dL    Creatinine 0.77 0.76 - 1.27 mg/dL    Sodium 138 136 - 145 mmol/L    Potassium 3.7 3.5 - 5.2 mmol/L    Chloride 101 98 - 107 mmol/L    CO2 20.1 (L) 22.0 - 29.0 mmol/L    Calcium 9.4 8.6 - 10.5 mg/dL    Total Protein 7.3 6.0 - 8.5 g/dL    Albumin 4.30 3.50 - 5.20 g/dL    ALT (SGPT) 16 1 - 41 U/L    AST (SGOT) 21 1 - 40 U/L    Alkaline Phosphatase 68 39 - 117 U/L    Total Bilirubin 1.3 (H) 0.0 - 1.2 mg/dL    Globulin 3.0 gm/dL    A/G Ratio 1.4 g/dL    BUN/Creatinine Ratio 11.7 7.0 - 25.0    Anion Gap 16.9 (H) 5.0 - 15.0 mmol/L    eGFR 122.0 >60.0 mL/min/1.73   Protime-INR    Collection Time: 10/19/22  3:05 AM    Specimen: Blood   Result Value Ref Range    Protime 13.4 11.7 - 14.2 Seconds    INR 1.01 0.90 - 1.10   aPTT    Collection Time: 10/19/22  3:05 AM    Specimen: Blood   Result Value Ref Range    PTT 29.3 22.7 - 35.4 seconds   Lipase    Collection Time: 10/19/22  3:05 AM    Specimen: Blood   Result Value Ref Range    Lipase 14 13 - 60 U/L   Troponin    Collection Time: 10/19/22  3:05 AM    Specimen: Blood   Result Value Ref Range    Troponin T <0.010 0.000 - 0.030 ng/mL   D-dimer, Quantitative    Collection Time: 10/19/22  3:05 AM    Specimen: Blood   Result Value Ref Range    D-Dimer, Quantitative 0.51 (H) 0.00 - 0.49 MCGFEU/mL   BNP    Collection Time:  10/19/22  3:05 AM    Specimen: Blood   Result Value Ref Range    proBNP 31.5 0.0 - 450.0 pg/mL   Procalcitonin    Collection Time: 10/19/22  3:05 AM    Specimen: Blood   Result Value Ref Range    Procalcitonin 0.06 0.00 - 0.25 ng/mL   Mononucleosis Screen    Collection Time: 10/19/22  3:05 AM    Specimen: Blood   Result Value Ref Range    Monospot Negative Negative   CBC Auto Differential    Collection Time: 10/19/22  3:05 AM    Specimen: Blood   Result Value Ref Range    WBC 8.19 3.40 - 10.80 10*3/mm3    RBC 5.38 4.14 - 5.80 10*6/mm3    Hemoglobin 15.5 13.0 - 17.7 g/dL    Hematocrit 45.5 37.5 - 51.0 %    MCV 84.6 79.0 - 97.0 fL    MCH 28.8 26.6 - 33.0 pg    MCHC 34.1 31.5 - 35.7 g/dL    RDW 13.1 12.3 - 15.4 %    RDW-SD 40.0 37.0 - 54.0 fl    MPV 11.8 6.0 - 12.0 fL    Platelets 200 140 - 450 10*3/mm3    Neutrophil % 78.0 (H) 42.7 - 76.0 %    Lymphocyte % 10.1 (L) 19.6 - 45.3 %    Monocyte % 9.9 5.0 - 12.0 %    Eosinophil % 1.5 0.3 - 6.2 %    Basophil % 0.4 0.0 - 1.5 %    Immature Grans % 0.1 0.0 - 0.5 %    Neutrophils, Absolute 6.39 1.70 - 7.00 10*3/mm3    Lymphocytes, Absolute 0.83 0.70 - 3.10 10*3/mm3    Monocytes, Absolute 0.81 0.10 - 0.90 10*3/mm3    Eosinophils, Absolute 0.12 0.00 - 0.40 10*3/mm3    Basophils, Absolute 0.03 0.00 - 0.20 10*3/mm3    Immature Grans, Absolute 0.01 0.00 - 0.05 10*3/mm3    nRBC 0.0 0.0 - 0.2 /100 WBC   Respiratory Panel PCR w/COVID-19(SARS-CoV-2) [facility] In-House, NP Swab in UTM/VTM, 3-4 HR TAT - Swab, Nasopharynx    Collection Time: 10/19/22  3:11 AM    Specimen: Nasopharynx; Swab   Result Value Ref Range    ADENOVIRUS, PCR Not Detected Not Detected    Coronavirus 229E Not Detected Not Detected    Coronavirus HKU1 Not Detected Not Detected    Coronavirus NL63 Not Detected Not Detected    Coronavirus OC43 Not Detected Not Detected    COVID19 Not Detected Not Detected - Ref. Range    Human Metapneumovirus Not Detected Not Detected    Human Rhinovirus/Enterovirus  Detected (A) Not Detected    Influenza A PCR Not Detected Not Detected    Influenza B PCR Not Detected Not Detected    Parainfluenza Virus 1 Not Detected Not Detected    Parainfluenza Virus 2 Not Detected Not Detected    Parainfluenza Virus 3 Not Detected Not Detected    Parainfluenza Virus 4 Not Detected Not Detected    RSV, PCR Not Detected Not Detected    Bordetella pertussis pcr Not Detected Not Detected    Bordetella parapertussis PCR Not Detected Not Detected    Chlamydophila pneumoniae PCR Not Detected Not Detected    Mycoplasma pneumo by PCR Not Detected Not Detected   Lactic Acid, Plasma    Collection Time: 10/19/22  3:28 AM    Specimen: Arm, Right; Blood   Result Value Ref Range    Lactate 1.7 0.5 - 2.0 mmol/L   Urinalysis With Microscopic If Indicated (No Culture) - Urine, Clean Catch    Collection Time: 10/19/22  4:45 AM    Specimen: Urine, Clean Catch   Result Value Ref Range    Color, UA Yellow Yellow, Straw    Appearance, UA Clear Clear    pH, UA 7.5 5.0 - 8.0    Specific Gravity, UA 1.020 1.005 - 1.030    Glucose, UA Negative Negative    Ketones, UA 40 mg/dL (2+) (A) Negative    Bilirubin, UA Negative Negative    Blood, UA Negative Negative    Protein, UA Negative Negative    Leuk Esterase, UA Negative Negative    Nitrite, UA Negative Negative    Urobilinogen, UA 1.0 E.U./dL 0.2 - 1.0 E.U./dL   Troponin    Collection Time: 10/19/22  5:17 AM    Specimen: Arm, Right; Blood   Result Value Ref Range    Troponin T <0.010 0.000 - 0.030 ng/mL       Ordered the above labs and independently reviewed the results.        RADIOLOGY  CT Abdomen Pelvis Without Contrast    Result Date: 10/19/2022  CT OF THE ABDOMEN AND PELVIS WITH CONTRAST  HISTORY: Right-sided abdominal pain  COMPARISON: 04/03/2022  TECHNIQUE: Axial CT imaging was obtained through the abdomen and pelvis. No IV contrast was administered.  FINDINGS: Images through the lung bases demonstrate dependent atelectasis. There are changes of gastric sleeve  procedure. There is a small hiatal hernia. No suspicious hepatic lesions are seen. Splenomegaly is again noted, with the spleen measuring 14.2 cm in craniocaudal dimensions. Pancreas is normal. Gallbladder is surgically absent. There is no intra or extrahepatic biliary dilatation. 2 stones are seen within the superior pole of the right kidney, with the larger measuring up to 7 mm. No hydronephrosis is seen on either side. No distal ureteral or bladder stones are seen. Prostate gland is within normal limits. Urinary bladder appears mildly distended. There is no bowel obstruction. There is no evidence of appendicitis. Patient does have some mildly patulous loops of small bowel, particularly within the right side of the abdomen. Nonspecific enteritis is not excluded. No acute osseous abnormalities are seen.       1. Exam is degraded by lack of contrast material. However, patient does appear to have some mildly patulous loops of small bowel, particularly within the right side of the abdomen. Nonspecific enteritis is not excluded. 2. Nonobstructing right renal stones.  Radiation dose reduction techniques were utilized, including automated exposure control and exposure modulation based on body size.  This report was finalized on 10/19/2022 3:56 AM by Dr. Mine Abreu M.D.      XR Chest 1 View    Result Date: 10/19/2022  SINGLE VIEW OF THE CHEST  HISTORY: Shortness of air  COMPARISON: None available.  FINDINGS: Heart size is within normal limits. No pneumothorax, pleural effusion, or acute infiltrate is seen. There is mild elevation of the right hemidiaphragm.      No acute findings.  This report was finalized on 10/19/2022 3:42 AM by Dr. Mine Abreu M.D.        I ordered the above noted radiological studies. Reviewed by me and discussed with radiologist.  See dictation for official radiology interpretation.      PROCEDURES    Procedures      MEDICATIONS GIVEN IN ER    Medications   sodium chloride 0.9 % flush  10 mL (has no administration in time range)   metoclopramide (REGLAN) injection 10 mg (has no administration in time range)   diphenhydrAMINE (BENADRYL) injection 25 mg (has no administration in time range)   ketorolac (TORADOL) injection 15 mg (has no administration in time range)   sodium chloride 0.9 % bolus 1,000 mL (0 mL Intravenous Stopped 10/19/22 0517)   ondansetron (ZOFRAN) injection 4 mg (4 mg Intravenous Given 10/19/22 0324)   morphine injection 2 mg (2 mg Intravenous Given 10/19/22 0324)   iopamidol (ISOVUE-370) 76 % injection 100 mL (95 mL Intravenous Given 10/19/22 0801)         PROGRESS, DATA ANALYSIS, CONSULTS, AND MEDICAL DECISION MAKING    All labs have been independently reviewed by me.  All radiology studies have been reviewed by me and discussed with radiologist dictating the report.   EKG's independently viewed and interpreted by me.  Discussion below represents my analysis of pertinent findings related to patient's condition, differential diagnosis, treatment plan and final disposition.        ED Course as of 10/19/22 0807   Wed Oct 19, 2022   0316 EKG          EKG time: 0252  Rhythm/Rate: Sinus rhythm rate 90  P waves and IN: Normal  QRS, axis: Narrow regular  ST and T waves: Normal    Interpreted Contemporaneously by me, independently viewed [TJ]   0753 Creatinine: 0.77 [TJ]      ED Course User Index  [TJ] Carlito Carranza MD           PPE: The patient wore a surgical mask throughout the entire patient encounter. I wore an N95.    AS OF 08:07 EDT VITALS:    BP - 156/95  HR - 83  TEMP - 97.6 °F (36.4 °C)  O2 SATS - 99%        DIAGNOSIS  Final diagnoses:   Rhinovirus   Acute nonintractable headache, unspecified headache type   Myalgia         DISPOSITION  Discharge           Carlito Carranza MD  10/19/22 0807

## 2022-10-24 LAB
BACTERIA SPEC AEROBE CULT: NORMAL
BACTERIA SPEC AEROBE CULT: NORMAL

## 2023-01-23 ENCOUNTER — TRANSCRIBE ORDERS (OUTPATIENT)
Dept: CARDIOLOGY | Facility: CLINIC | Age: 33
End: 2023-01-23
Payer: COMMERCIAL

## 2023-01-23 DIAGNOSIS — Z00.00 PHYSICAL EXAM: Primary | ICD-10-CM

## 2023-02-10 ENCOUNTER — HOSPITAL ENCOUNTER (OUTPATIENT)
Dept: CARDIOLOGY | Facility: HOSPITAL | Age: 33
Discharge: HOME | End: 2023-02-10

## 2023-02-10 DIAGNOSIS — Z00.00 PHYSICAL EXAM: ICD-10-CM

## 2023-02-10 LAB
BH CV STRESS BP STAGE 1: NORMAL
BH CV STRESS BP STAGE 2: NORMAL
BH CV STRESS BP STAGE 3: NORMAL
BH CV STRESS DURATION MIN STAGE 1: 3
BH CV STRESS DURATION MIN STAGE 2: 3
BH CV STRESS DURATION MIN STAGE 3: 3
BH CV STRESS DURATION SEC STAGE 1: 0
BH CV STRESS DURATION SEC STAGE 2: 0
BH CV STRESS DURATION SEC STAGE 3: 0
BH CV STRESS GRADE STAGE 1: 10
BH CV STRESS GRADE STAGE 2: 12
BH CV STRESS GRADE STAGE 3: 14
BH CV STRESS HR STAGE 1: 103
BH CV STRESS HR STAGE 2: 136
BH CV STRESS HR STAGE 3: 179
BH CV STRESS METS STAGE 1: 5
BH CV STRESS METS STAGE 2: 7.5
BH CV STRESS METS STAGE 3: 10
BH CV STRESS PROTOCOL 1: NORMAL
BH CV STRESS RECOVERY BP: NORMAL MMHG
BH CV STRESS RECOVERY HR: 91 BPM
BH CV STRESS SPEED STAGE 1: 1.7
BH CV STRESS SPEED STAGE 2: 2.5
BH CV STRESS SPEED STAGE 3: 3.4
BH CV STRESS STAGE 1: 1
BH CV STRESS STAGE 2: 2
BH CV STRESS STAGE 3: 3
MAXIMAL PREDICTED HEART RATE: 188 BPM
PERCENT MAX PREDICTED HR: 95.21 %
STRESS BASELINE BP: NORMAL MMHG
STRESS BASELINE HR: 58 BPM
STRESS PERCENT HR: 112 %
STRESS POST ESTIMATED WORKLOAD: 10 METS
STRESS POST EXERCISE DUR MIN: 9 MIN
STRESS POST EXERCISE DUR SEC: 0 SEC
STRESS POST PEAK BP: NORMAL MMHG
STRESS POST PEAK HR: 179 BPM
STRESS TARGET HR: 160 BPM

## 2023-02-10 PROCEDURE — 93017 CV STRESS TEST TRACING ONLY: CPT

## 2023-02-10 PROCEDURE — 93016 CV STRESS TEST SUPVJ ONLY: CPT | Performed by: INTERNAL MEDICINE

## 2023-02-10 PROCEDURE — 93018 CV STRESS TEST I&R ONLY: CPT | Performed by: INTERNAL MEDICINE

## 2023-10-03 ENCOUNTER — OFFICE VISIT (OUTPATIENT)
Dept: FAMILY MEDICINE CLINIC | Facility: CLINIC | Age: 33
End: 2023-10-03
Payer: COMMERCIAL

## 2023-10-03 VITALS
SYSTOLIC BLOOD PRESSURE: 148 MMHG | HEART RATE: 69 BPM | WEIGHT: 290 LBS | OXYGEN SATURATION: 97 % | BODY MASS INDEX: 36.06 KG/M2 | DIASTOLIC BLOOD PRESSURE: 108 MMHG | HEIGHT: 75 IN

## 2023-10-03 DIAGNOSIS — F41.9 ANXIETY: ICD-10-CM

## 2023-10-03 DIAGNOSIS — Z00.8 PSYCHOLOGICAL ASSESSMENT: ICD-10-CM

## 2023-10-03 DIAGNOSIS — K21.00 GASTROESOPHAGEAL REFLUX DISEASE WITH ESOPHAGITIS WITHOUT HEMORRHAGE: ICD-10-CM

## 2023-10-03 DIAGNOSIS — K58.0 IRRITABLE BOWEL SYNDROME WITH DIARRHEA: ICD-10-CM

## 2023-10-03 DIAGNOSIS — M10.00 IDIOPATHIC GOUT, UNSPECIFIED CHRONICITY, UNSPECIFIED SITE: ICD-10-CM

## 2023-10-03 DIAGNOSIS — Z13.1 DIABETES MELLITUS SCREENING: ICD-10-CM

## 2023-10-03 DIAGNOSIS — Z00.00 ANNUAL PHYSICAL EXAM: Primary | ICD-10-CM

## 2023-10-03 DIAGNOSIS — Z78.9 PROFOUND INATTENTION: ICD-10-CM

## 2023-10-03 DIAGNOSIS — I10 ESSENTIAL HYPERTENSION: ICD-10-CM

## 2023-10-03 DIAGNOSIS — Z13.220 LIPID SCREENING: ICD-10-CM

## 2023-10-03 PROBLEM — U07.1 DISEASE DUE TO SEVERE ACUTE RESPIRATORY SYNDROME CORONAVIRUS 2 (SARS-COV-2): Status: ACTIVE | Noted: 2023-10-03

## 2023-10-03 LAB
ALBUMIN SERPL-MCNC: 4.5 G/DL (ref 3.5–5.2)
ALBUMIN/GLOB SERPL: 1.7 G/DL
ALP SERPL-CCNC: 53 U/L (ref 39–117)
ALT SERPL-CCNC: 18 U/L (ref 1–41)
AST SERPL-CCNC: 17 U/L (ref 1–40)
BASOPHILS # BLD AUTO: 0.03 10*3/MM3 (ref 0–0.2)
BASOPHILS NFR BLD AUTO: 0.5 % (ref 0–1.5)
BILIRUB SERPL-MCNC: 0.7 MG/DL (ref 0–1.2)
BUN SERPL-MCNC: 9 MG/DL (ref 6–20)
BUN/CREAT SERPL: 10.2 (ref 7–25)
CALCIUM SERPL-MCNC: 9.9 MG/DL (ref 8.6–10.5)
CHLORIDE SERPL-SCNC: 101 MMOL/L (ref 98–107)
CHOLEST SERPL-MCNC: 177 MG/DL (ref 0–200)
CO2 SERPL-SCNC: 30.9 MMOL/L (ref 22–29)
CREAT SERPL-MCNC: 0.88 MG/DL (ref 0.76–1.27)
EGFRCR SERPLBLD CKD-EPI 2021: 116.4 ML/MIN/1.73
EOSINOPHIL # BLD AUTO: 0.16 10*3/MM3 (ref 0–0.4)
EOSINOPHIL NFR BLD AUTO: 2.4 % (ref 0.3–6.2)
ERYTHROCYTE [DISTWIDTH] IN BLOOD BY AUTOMATED COUNT: 13.2 % (ref 12.3–15.4)
GLOBULIN SER CALC-MCNC: 2.6 GM/DL
GLUCOSE SERPL-MCNC: 87 MG/DL (ref 65–99)
HBA1C MFR BLD: 5.3 % (ref 4.8–5.6)
HCT VFR BLD AUTO: 46.3 % (ref 37.5–51)
HDLC SERPL-MCNC: 44 MG/DL (ref 40–60)
HGB BLD-MCNC: 15.6 G/DL (ref 13–17.7)
IMM GRANULOCYTES # BLD AUTO: 0.02 10*3/MM3 (ref 0–0.05)
IMM GRANULOCYTES NFR BLD AUTO: 0.3 % (ref 0–0.5)
LDLC SERPL CALC-MCNC: 109 MG/DL (ref 0–100)
LYMPHOCYTES # BLD AUTO: 2.34 10*3/MM3 (ref 0.7–3.1)
LYMPHOCYTES NFR BLD AUTO: 35.6 % (ref 19.6–45.3)
MCH RBC QN AUTO: 28.4 PG (ref 26.6–33)
MCHC RBC AUTO-ENTMCNC: 33.7 G/DL (ref 31.5–35.7)
MCV RBC AUTO: 84.2 FL (ref 79–97)
MONOCYTES # BLD AUTO: 0.64 10*3/MM3 (ref 0.1–0.9)
MONOCYTES NFR BLD AUTO: 9.7 % (ref 5–12)
NEUTROPHILS # BLD AUTO: 3.39 10*3/MM3 (ref 1.7–7)
NEUTROPHILS NFR BLD AUTO: 51.5 % (ref 42.7–76)
NRBC BLD AUTO-RTO: 0 /100 WBC (ref 0–0.2)
PLATELET # BLD AUTO: 207 10*3/MM3 (ref 140–450)
POTASSIUM SERPL-SCNC: 4 MMOL/L (ref 3.5–5.2)
PROT SERPL-MCNC: 7.1 G/DL (ref 6–8.5)
RBC # BLD AUTO: 5.5 10*6/MM3 (ref 4.14–5.8)
SODIUM SERPL-SCNC: 141 MMOL/L (ref 136–145)
TRIGL SERPL-MCNC: 136 MG/DL (ref 0–150)
VLDLC SERPL CALC-MCNC: 24 MG/DL (ref 5–40)
WBC # BLD AUTO: 6.58 10*3/MM3 (ref 3.4–10.8)

## 2023-10-03 RX ORDER — FAMOTIDINE 20 MG/1
20 TABLET, FILM COATED ORAL 2 TIMES DAILY
Qty: 90 TABLET | Refills: 0 | Status: SHIPPED | OUTPATIENT
Start: 2023-10-03

## 2023-10-03 RX ORDER — BUSPIRONE HYDROCHLORIDE 5 MG/1
5 TABLET ORAL 3 TIMES DAILY
Qty: 90 TABLET | Refills: 5 | Status: SHIPPED | OUTPATIENT
Start: 2023-10-03

## 2023-10-03 RX ORDER — METOPROLOL SUCCINATE 100 MG/1
100 TABLET, EXTENDED RELEASE ORAL DAILY
Qty: 90 TABLET | Refills: 0 | Status: SHIPPED | OUTPATIENT
Start: 2023-10-03

## 2023-10-03 RX ORDER — NAPROXEN 500 MG/1
500 TABLET ORAL 2 TIMES DAILY PRN
Qty: 60 TABLET | Refills: 0 | Status: SHIPPED | OUTPATIENT
Start: 2023-10-03

## 2023-10-03 NOTE — PROGRESS NOTES
"Chief Complaint  Anxiety, Hypertension, Stress, Diarrhea, and difficulty focusing.    Subjective        Roscoe Arias presents to [facility]  History of Present Illness  Roscoe reports he has a lot on his plate right now.  He and his significant other are planning a wedding, they are pregnant with their second child, and his significant other just got laid off.  He is working 90 + hours a week as it is, and he just does not know what he is going to do at this point.  He is now finding that he is having concentration issues and is unable to stay focused on one task at a time.  He is a  and this is distracting not only to him but his co-workers are beginning to notice a change in him.    Objective   Vital Signs:  BP (!) 148/108   Pulse 69   Ht 190.5 cm (75\")   Wt 132 kg (290 lb)   SpO2 97%   BMI 36.25 kg/m²   Estimated body mass index is 36.25 kg/m² as calculated from the following:    Height as of this encounter: 190.5 cm (75\").    Weight as of this encounter: 132 kg (290 lb).             Physical Exam  Constitutional:       Appearance: He is obese.   HENT:      Head: Normocephalic and atraumatic.      Right Ear: Tympanic membrane normal.      Left Ear: Tympanic membrane normal.      Mouth/Throat:      Mouth: Mucous membranes are moist.   Eyes:      Pupils: Pupils are equal, round, and reactive to light.   Cardiovascular:      Rate and Rhythm: Normal rate and regular rhythm.      Pulses: Normal pulses.      Heart sounds: Normal heart sounds.   Pulmonary:      Effort: Pulmonary effort is normal.      Breath sounds: Normal breath sounds.   Abdominal:      General: Bowel sounds are normal.   Musculoskeletal:         General: Normal range of motion.   Skin:     General: Skin is warm.      Capillary Refill: Capillary refill takes less than 2 seconds.   Neurological:      General: No focal deficit present.      Mental Status: He is alert.   Psychiatric:         Mood and " "Affect: Mood normal.      Comments: Speech is rapid, Roscoe appears frazzled due to circumstances and his perception of \"lack of concentration\".                 Assessment and Plan   Diagnoses and all orders for this visit:    1. Annual physical exam (Primary)  -     CBC & Differential  -     Comprehensive Metabolic Panel    2. Essential hypertension  -     metoprolol succinate XL (Toprol XL) 100 MG 24 hr tablet; Take 1 tablet by mouth Daily.  Dispense: 90 tablet; Refill: 0    3. Gastroesophageal reflux disease with esophagitis without hemorrhage  -     famotidine (Pepcid) 20 MG tablet; Take 1 tablet by mouth 2 (Two) Times a Day.  Dispense: 90 tablet; Refill: 0    4. Irritable bowel syndrome with diarrhea  -     Ambulatory Referral to Gastroenterology    5. Idiopathic gout, unspecified chronicity, unspecified site  -     naproxen (EC NAPROSYN) 500 MG EC tablet; Take 1 tablet by mouth 2 (Two) Times a Day As Needed for Mild Pain.  Dispense: 60 tablet; Refill: 0    6. Anxiety  -     busPIRone (BUSPAR) 5 MG tablet; Take 1 tablet by mouth 3 (Three) Times a Day.  Dispense: 90 tablet; Refill: 5    7. Profound inattention  -     Ambulatory Referral to Behavioral Health    8. Lipid screening  -     Lipid Panel    9. Diabetes mellitus screening  -     Hemoglobin A1c    10. Psychological assessment  -     Ambulatory Referral to Behavioral Health      Class 2 Severe Obesity (BMI >=35 and <=39.9). Obesity-related health conditions include the following: obstructive sleep apnea, hypertension, and GERD. Obesity is improving with lifestyle modifications. BMI is  improving with lifestyle changes post bariactric sleeve surgery . We discussed low calorie, low carb based diet program, portion control, and increasing exercise.        Discussed Care Gaps, ordered referrals and encouraged vaccination updates.       - Pt agrees with plan of care and denies further questions/concerns today  - This document is intended for medical expert use " only. Persons  reading this document without medical staff guidance may result in misinterpretation and unintended morbidity     Go to the ER for any possible life-threatening symptoms such as chest pain or shortness of air.      Please allow 3-5 business days for recommendations based on new results      I personally spent time with this patient, preparing for the visit, reviewing tests, obtaining and/or reviewing a separately obtained history, performing a medically appropriate examination and/or evaluation, counseling and educating the patient/family/caregiver, ordering medications,  documenting information in the medical record and indepentently interpreting results.       Follow Up   Return in about 6 months (around 4/3/2024) for Recheck.  Patient was given instructions and counseling regarding his condition or for health maintenance advice. Please see specific information pulled into the AVS if appropriate.

## 2023-10-04 ENCOUNTER — TELEPHONE (OUTPATIENT)
Dept: FAMILY MEDICINE CLINIC | Facility: CLINIC | Age: 33
End: 2023-10-04
Payer: COMMERCIAL

## 2023-10-04 DIAGNOSIS — R41.840 ATTENTION AND CONCENTRATION DEFICIT: Primary | ICD-10-CM

## 2023-10-04 NOTE — PROGRESS NOTES
Roscoe,    Your C02 level is improving.  Your LDL is slightly elevated - keep working on diet and exercise to improve this number.  Your HgbA1C is 5.3 which is within normal range  Your CBC looks great

## 2023-10-04 NOTE — TELEPHONE ENCOUNTER
Arpita, will you please call Roscoe and let him know that we are sending him for testing through [facility] over on [facility] to evaluate his ADHD.  When that testing is complete and we receive a diagnosis, then I can put him in contact with a Psychiatrist to write his medications for ADHD.  This means he will have 2 appointments.  One for testing and one for the Psychiatrist.

## 2023-10-04 NOTE — TELEPHONE ENCOUNTER
Andre Monzon does not perform diagnostic testing for ADHD. He recommends either [facility], [facility], or [facility] to perform neuro-psych diagnostic testing for attention deficit disorders. A diagnosis is needed before Andre can establish care and start medication for the patient. Routing back to ambulatory.

## 2023-10-05 ENCOUNTER — TELEPHONE (OUTPATIENT)
Dept: FAMILY MEDICINE CLINIC | Facility: CLINIC | Age: 33
End: 2023-10-05
Payer: COMMERCIAL

## 2023-10-05 NOTE — PROGRESS NOTES
"Chief Complaint  Anxiety, Hypertension, Stress, Diarrhea, and difficulty focusing.    Subjective        Roscoe Arias presents to [facility]  History of Present Illness  Roscoe reports he has a lot on his plate right now.  He and his significant other are planning a wedding, they are pregnant with their second child, and his significant other just got laid off.  He is working 90 + hours a week as it is, and he just does not know what he is going to do at this point.  He is now finding that he is having concentration issues and is unable to stay focused on one task at a time.  He is a  and this is distracting not only to him but his co-workers are beginning to notice a change in him.    Objective   Vital Signs:  BP (!) 148/108   Pulse 69   Ht 190.5 cm (75\")   Wt 132 kg (290 lb)   SpO2 97%   BMI 36.25 kg/m²   Estimated body mass index is 36.25 kg/m² as calculated from the following:    Height as of this encounter: 190.5 cm (75\").    Weight as of this encounter: 132 kg (290 lb).             Physical Exam  Constitutional:       Appearance: He is obese.   HENT:      Head: Normocephalic and atraumatic.      Right Ear: Tympanic membrane normal.      Left Ear: Tympanic membrane normal.      Mouth/Throat:      Mouth: Mucous membranes are moist.   Eyes:      Pupils: Pupils are equal, round, and reactive to light.   Cardiovascular:      Rate and Rhythm: Normal rate and regular rhythm.      Pulses: Normal pulses.      Heart sounds: Normal heart sounds.   Pulmonary:      Effort: Pulmonary effort is normal.      Breath sounds: Normal breath sounds.   Abdominal:      General: Bowel sounds are normal.   Musculoskeletal:         General: Normal range of motion.   Skin:     General: Skin is warm.      Capillary Refill: Capillary refill takes less than 2 seconds.   Neurological:      General: No focal deficit present.      Mental Status: He is alert.   Psychiatric:         Mood and " "Affect: Mood normal.      Comments: Speech is rapid, Roscoe appears frazzled due to circumstances and his perception of \"lack of concentration\".                 Assessment and Plan   Diagnoses and all orders for this visit:    1. Annual physical exam (Primary)  -     CBC & Differential  -     Comprehensive Metabolic Panel    2. Essential hypertension  -     metoprolol succinate XL (Toprol XL) 100 MG 24 hr tablet; Take 1 tablet by mouth Daily.  Dispense: 90 tablet; Refill: 0    3. Gastroesophageal reflux disease with esophagitis without hemorrhage  -     famotidine (Pepcid) 20 MG tablet; Take 1 tablet by mouth 2 (Two) Times a Day.  Dispense: 90 tablet; Refill: 0    4. Irritable bowel syndrome with diarrhea  -     Ambulatory Referral to Gastroenterology    5. Idiopathic gout, unspecified chronicity, unspecified site  -     naproxen (EC NAPROSYN) 500 MG EC tablet; Take 1 tablet by mouth 2 (Two) Times a Day As Needed for Mild Pain.  Dispense: 60 tablet; Refill: 0    6. Anxiety  -     busPIRone (BUSPAR) 5 MG tablet; Take 1 tablet by mouth 3 (Three) Times a Day.  Dispense: 90 tablet; Refill: 5    7. Profound inattention  -     Ambulatory Referral to Behavioral Health    8. Lipid screening  -     Lipid Panel    9. Diabetes mellitus screening  -     Hemoglobin A1c    10. Psychological assessment  -     Ambulatory Referral to Behavioral Health      Class 2 Severe Obesity (BMI >=35 and <=39.9). Obesity-related health conditions include the following: obstructive sleep apnea, hypertension, and GERD. Obesity is improving with lifestyle modifications. BMI is improving with lifestyle changes post bariactric sleeve surgery. We discussed low calorie, low carb based diet program, portion control, and increasing exercise.        Discussed Care Gaps, ordered referrals and encouraged vaccination updates.       - Pt agrees with plan of care and denies further questions/concerns today  - This document is intended for medical expert use " only. Persons  reading this document without medical staff guidance may result in misinterpretation and unintended morbidity     Go to the ER for any possible life-threatening symptoms such as chest pain or shortness of air.      Please allow 3-5 business days for recommendations based on new results      I personally spent time with this patient, preparing for the visit, reviewing tests, obtaining and/or reviewing a separately obtained history, performing a medically appropriate examination and/or evaluation, counseling and educating the patient/family/caregiver, ordering medications,  documenting information in the medical record and indepentently interpreting results.       Follow Up   Return in about 6 months (around 4/3/2024) for Recheck.  Patient was given instructions and counseling regarding his condition or for health maintenance advice. Please see specific information pulled into the AVS if appropriate.

## 2023-10-06 ENCOUNTER — PATIENT ROUNDING (BHMG ONLY) (OUTPATIENT)
Dept: FAMILY MEDICINE CLINIC | Facility: CLINIC | Age: 33
End: 2023-10-06
Payer: COMMERCIAL

## 2023-10-06 NOTE — PROGRESS NOTES
A [employer] message has been sent to the patient for PATIENT ROUNDING with [facility].

## 2023-12-15 ENCOUNTER — OFFICE VISIT (OUTPATIENT)
Dept: GASTROENTEROLOGY | Facility: CLINIC | Age: 33
End: 2023-12-15
Payer: COMMERCIAL

## 2023-12-15 VITALS
BODY MASS INDEX: 38.02 KG/M2 | SYSTOLIC BLOOD PRESSURE: 138 MMHG | OXYGEN SATURATION: 97 % | TEMPERATURE: 98.1 F | WEIGHT: 305.8 LBS | DIASTOLIC BLOOD PRESSURE: 86 MMHG | HEART RATE: 57 BPM | HEIGHT: 75 IN

## 2023-12-15 DIAGNOSIS — K58.0 IRRITABLE BOWEL SYNDROME WITH DIARRHEA: Primary | Chronic | ICD-10-CM

## 2023-12-15 DIAGNOSIS — K21.9 GASTROESOPHAGEAL REFLUX DISEASE, UNSPECIFIED WHETHER ESOPHAGITIS PRESENT: Chronic | ICD-10-CM

## 2023-12-15 DIAGNOSIS — Z12.11 COLON CANCER SCREENING: ICD-10-CM

## 2023-12-15 PROCEDURE — 99214 OFFICE O/P EST MOD 30 MIN: CPT | Performed by: NURSE PRACTITIONER

## 2023-12-15 RX ORDER — MONTELUKAST SODIUM 4 MG/1
TABLET, CHEWABLE ORAL
Qty: 120 TABLET | Refills: 5 | Status: SHIPPED | OUTPATIENT
Start: 2023-12-15

## 2023-12-15 RX ORDER — PANTOPRAZOLE SODIUM 40 MG/1
40 TABLET, DELAYED RELEASE ORAL DAILY
Qty: 90 TABLET | Refills: 3 | Status: SHIPPED | OUTPATIENT
Start: 2023-12-15

## 2023-12-15 NOTE — PROGRESS NOTES
"Chief Complaint   Patient presents with    Heartburn    Diarrhea         History of Present Illness  33-year-old male presents the office today for evaluation of IBS-D. He reports that symptoms started after his gastric sleeve surgery in [place] in 2021. He then underwent cholecystectomy in 2022. He really started noticing symptoms after gallbladder removal. He would experience fecal urgency after eating and averages about 2-3 on a good day and 6-7 on a bad day.  Stools are generally loose.  He denies any rectal bleeding. He does not really endorse abdominal pain, just mild discomfort if he overeats.  He denies any family history of colon cancer.     Has also has a history of GERD and takes famotidine 20 mg twice daily for about 1 month. Symptoms have improved. He has been taking Pepcid for over a year OTC. He notes that foods can trigger symptoms and symptoms can occur in the night. He reports having symptoms daily.  He denies any nausea or vomiting. He denies dysphagia. He did have an EGD at the time of his cholecystectomy.     Result Review :       CT chest PE Protocol w (09/25/2023 11:34)   Lipid Panel (10/03/2023 09:06)   Comprehensive Metabolic Panel (10/03/2023 09:06)     Vital Signs:   /86   Pulse 57   Temp 98.1 °F (36.7 °C)   Ht 190.5 cm (75\")   Wt (!) 139 kg (305 lb 12.8 oz)   SpO2 97%   BMI 38.22 kg/m²     Body mass index is 38.22 kg/m².     Physical Exam  Vitals reviewed.   Constitutional:       Appearance: Normal appearance.   Pulmonary:      Effort: Pulmonary effort is normal. No respiratory distress.   Abdominal:      General: Abdomen is flat. Bowel sounds are normal. There is no distension.      Palpations: Abdomen is soft. There is no mass.      Tenderness: There is no abdominal tenderness. There is no guarding.   Musculoskeletal:         General: Normal range of motion.   Skin:     General: Skin is warm and dry.   Neurological:      General: No focal deficit present.      Mental Status: " He is alert and oriented to person, place, and time.   Psychiatric:         Mood and Affect: Mood normal.         Behavior: Behavior normal.         Thought Content: Thought content normal.         Judgment: Judgment normal.       Assessment and Plan    Diagnoses and all orders for this visit:    1. Irritable bowel syndrome with diarrhea (Primary)    2. Gastroesophageal reflux disease, unspecified whether esophagitis present    Other orders  -     pantoprazole (PROTONIX) 40 MG EC tablet; Take 1 tablet by mouth Daily.  Dispense: 90 tablet; Refill: 3  -     colestipol (COLESTID) 1 g tablet; Start with 1 tab twice daily and may increase by 1 tab per week based on bowel habits; max dose 6 tabs/day  Dispense: 120 tablet; Refill: 5           Patient Instructions   Stop Pepcid.    2.   Start pantoprazole 40 mg once daily each morning. This prescription has been sent to your pharmacy.     3.  For GERD, we recommend avoiding eating 3-4 hours before bedtime, eating smaller more frequent meals, and avoiding any known food triggers including spicy foods, tomatoes and tomato-based sauces, chocolate, coffee/tea, citrus fruits, carbonated  beverages and alcohol.     4.  For bile salt diarrhea we have sent in a prescription for colestipol. Start off with taking 1 tab twice daily and you may increase your dosing by 1 tab per week ( I.e. 2 tab in the morning and 1 tab in the evening-for example). Max dose is 6 tabs/day.     5.  First colonoscopy due at age 45.     6.  Plan for office follow up in 3 months for reassessment of symptoms.       Discussion:  As famotidine seems to have been ineffective, we will place the patient on pantoprazole 40 mg once daily for GERD and have recommended he follow antireflux precautions.  If symptoms fail to improve could consider EGD.    For suspected bile salt diarrhea status postcholecystectomy we have placed the patient on colestipol with the ability to titrate his dosing based upon bowel habits.   First colonoscopy due at age 45.  If symptoms fail to improve or worsen could consider the addition of dicyclomine or place the patient on a course of Xifaxan.  Plan for office up in 3 months for reassessment of symptoms or sooner should symptoms worsen or fail to improve.  Patient verbalized understanding of above plan of care and is in agreement.  All questions answered and support provided.    EMR Dragon/Transcription Disclaimer:  This document has been Dictated utilizing Dragon dictation.

## 2023-12-15 NOTE — PATIENT INSTRUCTIONS
Stop Pepcid.    2.   Start pantoprazole 40 mg once daily each morning. This prescription has been sent to your pharmacy.     3.  For GERD, we recommend avoiding eating 3-4 hours before bedtime, eating smaller more frequent meals, and avoiding any known food triggers including spicy foods, tomatoes and tomato-based sauces, chocolate, coffee/tea, citrus fruits, carbonated  beverages and alcohol.     4.  For bile salt diarrhea we have sent in a prescription for colestipol. Start off with taking 1 tab twice daily and you may increase your dosing by 1 tab per week ( I.e. 2 tab in the morning and 1 tab in the evening-for example). Max dose is 6 tabs/day.     5.  First colonoscopy due at age 45.     6.  Plan for office follow up in 3 months for reassessment of symptoms.

## 2023-12-30 DIAGNOSIS — I10 ESSENTIAL HYPERTENSION: ICD-10-CM

## 2024-01-02 RX ORDER — METOPROLOL SUCCINATE 100 MG/1
100 TABLET, EXTENDED RELEASE ORAL DAILY
Qty: 90 TABLET | Refills: 0 | Status: SHIPPED | OUTPATIENT
Start: 2024-01-02

## 2024-01-16 ENCOUNTER — OFFICE VISIT (OUTPATIENT)
Dept: FAMILY MEDICINE CLINIC | Facility: CLINIC | Age: 34
End: 2024-01-16
Payer: COMMERCIAL

## 2024-01-16 VITALS
BODY MASS INDEX: 38.07 KG/M2 | WEIGHT: 306.2 LBS | SYSTOLIC BLOOD PRESSURE: 126 MMHG | OXYGEN SATURATION: 97 % | HEART RATE: 55 BPM | DIASTOLIC BLOOD PRESSURE: 84 MMHG | HEIGHT: 75 IN

## 2024-01-16 DIAGNOSIS — Z13.220 LIPID SCREENING: ICD-10-CM

## 2024-01-16 DIAGNOSIS — F51.01 PRIMARY INSOMNIA: Primary | ICD-10-CM

## 2024-01-16 DIAGNOSIS — E66.01 MORBID (SEVERE) OBESITY DUE TO EXCESS CALORIES: ICD-10-CM

## 2024-01-16 LAB
ALBUMIN SERPL-MCNC: 4.2 G/DL (ref 3.5–5.2)
ALBUMIN/GLOB SERPL: 1.7 G/DL
ALP SERPL-CCNC: 55 U/L (ref 39–117)
ALT SERPL-CCNC: 20 U/L (ref 1–41)
AST SERPL-CCNC: 19 U/L (ref 1–40)
BASOPHILS # BLD AUTO: 0.04 10*3/MM3 (ref 0–0.2)
BASOPHILS NFR BLD AUTO: 0.6 % (ref 0–1.5)
BILIRUB SERPL-MCNC: 0.7 MG/DL (ref 0–1.2)
BUN SERPL-MCNC: 13 MG/DL (ref 6–20)
BUN/CREAT SERPL: 14.3 (ref 7–25)
CALCIUM SERPL-MCNC: 9.2 MG/DL (ref 8.6–10.5)
CHLORIDE SERPL-SCNC: 106 MMOL/L (ref 98–107)
CHOLEST SERPL-MCNC: 163 MG/DL (ref 0–200)
CO2 SERPL-SCNC: 25.3 MMOL/L (ref 22–29)
CREAT SERPL-MCNC: 0.91 MG/DL (ref 0.76–1.27)
EGFRCR SERPLBLD CKD-EPI 2021: 114.1 ML/MIN/1.73
EOSINOPHIL # BLD AUTO: 0.23 10*3/MM3 (ref 0–0.4)
EOSINOPHIL NFR BLD AUTO: 3.4 % (ref 0.3–6.2)
ERYTHROCYTE [DISTWIDTH] IN BLOOD BY AUTOMATED COUNT: 13 % (ref 12.3–15.4)
GLOBULIN SER CALC-MCNC: 2.5 GM/DL
GLUCOSE SERPL-MCNC: 74 MG/DL (ref 65–99)
HCT VFR BLD AUTO: 42.2 % (ref 37.5–51)
HDLC SERPL-MCNC: 39 MG/DL (ref 40–60)
HGB BLD-MCNC: 13.8 G/DL (ref 13–17.7)
IMM GRANULOCYTES # BLD AUTO: 0.01 10*3/MM3 (ref 0–0.05)
IMM GRANULOCYTES NFR BLD AUTO: 0.1 % (ref 0–0.5)
LDLC SERPL CALC-MCNC: 106 MG/DL (ref 0–100)
LYMPHOCYTES # BLD AUTO: 2.27 10*3/MM3 (ref 0.7–3.1)
LYMPHOCYTES NFR BLD AUTO: 33.5 % (ref 19.6–45.3)
MCH RBC QN AUTO: 27.7 PG (ref 26.6–33)
MCHC RBC AUTO-ENTMCNC: 32.7 G/DL (ref 31.5–35.7)
MCV RBC AUTO: 84.6 FL (ref 79–97)
MONOCYTES # BLD AUTO: 0.67 10*3/MM3 (ref 0.1–0.9)
MONOCYTES NFR BLD AUTO: 9.9 % (ref 5–12)
NEUTROPHILS # BLD AUTO: 3.56 10*3/MM3 (ref 1.7–7)
NEUTROPHILS NFR BLD AUTO: 52.5 % (ref 42.7–76)
NRBC BLD AUTO-RTO: 0 /100 WBC (ref 0–0.2)
PLATELET # BLD AUTO: 203 10*3/MM3 (ref 140–450)
POTASSIUM SERPL-SCNC: 4.2 MMOL/L (ref 3.5–5.2)
PROT SERPL-MCNC: 6.7 G/DL (ref 6–8.5)
RBC # BLD AUTO: 4.99 10*6/MM3 (ref 4.14–5.8)
SODIUM SERPL-SCNC: 142 MMOL/L (ref 136–145)
TRIGL SERPL-MCNC: 97 MG/DL (ref 0–150)
VLDLC SERPL CALC-MCNC: 18 MG/DL (ref 5–40)
WBC # BLD AUTO: 6.78 10*3/MM3 (ref 3.4–10.8)

## 2024-01-16 RX ORDER — TRAZODONE HYDROCHLORIDE 50 MG/1
50 TABLET ORAL NIGHTLY
Qty: 30 TABLET | Refills: 5 | Status: SHIPPED | OUTPATIENT
Start: 2024-01-16

## 2024-01-16 NOTE — PROGRESS NOTES
"Chief Complaint  Follow-up and Hypertension    Subjective    Roscoe Arias presents to [facility]  History of Present Illness    His blood pressure is better today.  He was surprised by this.  He still has a lot of stress going on in his life.  He is  a  and work is busy this time of year as well.  He will be a new Dad by the end of the month and this is an additional stressor.  He and his fiancee will  in March 2025.    The buspar is taking off the edge of his anxiety.  He learned that taking two (10 mg) is too much and makes him feel spacey.      The colestipol remains to be seen if this is working for his cholesterol levels.      Protonix is working well for his abdominal issues at this time.    He reports he is not sleeping well - ever and is extremely fatigued all the time.      Objective   Vital Signs:  /84   Pulse 55   Ht 190.5 cm (75\")   Wt (!) 139 kg (306 lb 3.2 oz)   SpO2 97%   BMI 38.27 kg/m²   Estimated body mass index is 38.27 kg/m² as calculated from the following:    Height as of this encounter: 190.5 cm (75\").    Weight as of this encounter: 139 kg (306 lb 3.2 oz).       Physical Exam  Constitutional:       Appearance: He is obese.   HENT:      Head: Normocephalic and atraumatic.      Mouth/Throat:      Mouth: Mucous membranes are moist.   Eyes:      Pupils: Pupils are equal, round, and reactive to light.   Cardiovascular:      Rate and Rhythm: Normal rate and regular rhythm.      Pulses: Normal pulses.      Heart sounds: Normal heart sounds.   Pulmonary:      Effort: Pulmonary effort is normal.      Breath sounds: Normal breath sounds.   Abdominal:      General: Bowel sounds are normal.   Musculoskeletal:         General: Normal range of motion.   Skin:     General: Skin is warm.      Capillary Refill: Capillary refill takes less than 2 seconds.   Neurological:      General: No focal deficit present.      Mental Status: He is alert. "   Psychiatric:         Mood and Affect: Mood normal.            Assessment and Plan   Diagnoses and all orders for this visit:    1. Primary insomnia (Primary)  -     traZODone (DESYREL) 50 MG tablet; Take 1 tablet by mouth Every Night. Do not take on nights you are working.  Dispense: 30 tablet; Refill: 5    2. Morbid (severe) obesity due to excess calories  -     CBC & Differential  -     Comprehensive Metabolic Panel    3. Lipid screening  -     Lipid Panel    Discussed Care Gaps, ordered referrals and encouraged vaccination updates.       - Pt agrees with plan of care and denies further questions/concerns today  - This document is intended for medical expert use only. Persons  reading this document without medical staff guidance may result in misinterpretation and unintended morbidity     Go to the ER for any possible life-threatening symptoms such as chest pain or shortness of air.      Please allow 3-5 business days for recommendations based on new results      I personally spent time with this patient, preparing for the visit, reviewing tests, obtaining and/or reviewing a separately obtained history, performing a medically appropriate examination and/or evaluation, counseling and educating the patient/family/caregiver, ordering medications,  documenting information in the medical record and indepentently interpreting results.          Follow Up     Return in about 6 months (around 7/16/2024) for Annual physical.  Patient was given instructions and counseling regarding his condition or for health maintenance advice. Please see specific information pulled into the AVS if appropriate.

## 2024-01-27 DIAGNOSIS — K21.00 GASTROESOPHAGEAL REFLUX DISEASE WITH ESOPHAGITIS WITHOUT HEMORRHAGE: ICD-10-CM

## 2024-01-27 DIAGNOSIS — M10.00 IDIOPATHIC GOUT, UNSPECIFIED CHRONICITY, UNSPECIFIED SITE: ICD-10-CM

## 2024-01-29 RX ORDER — FAMOTIDINE 20 MG/1
20 TABLET, FILM COATED ORAL 2 TIMES DAILY
Qty: 60 TABLET | OUTPATIENT
Start: 2024-01-29

## 2024-01-29 RX ORDER — NAPROXEN 500 MG/1
TABLET, DELAYED RELEASE ORAL
Qty: 60 TABLET | Refills: 5 | Status: SHIPPED | OUTPATIENT
Start: 2024-01-29

## 2024-02-12 ENCOUNTER — OFFICE VISIT (OUTPATIENT)
Dept: FAMILY MEDICINE CLINIC | Facility: CLINIC | Age: 34
End: 2024-02-12
Payer: COMMERCIAL

## 2024-02-12 VITALS
DIASTOLIC BLOOD PRESSURE: 86 MMHG | SYSTOLIC BLOOD PRESSURE: 126 MMHG | WEIGHT: 312.4 LBS | HEIGHT: 75 IN | BODY MASS INDEX: 38.84 KG/M2 | OXYGEN SATURATION: 98 % | HEART RATE: 46 BPM

## 2024-02-12 DIAGNOSIS — F32.2 CURRENT SEVERE EPISODE OF MAJOR DEPRESSIVE DISORDER WITHOUT PSYCHOTIC FEATURES, UNSPECIFIED WHETHER RECURRENT: ICD-10-CM

## 2024-02-12 DIAGNOSIS — R05.1 ACUTE COUGH: Primary | ICD-10-CM

## 2024-02-12 DIAGNOSIS — F90.2 ATTENTION DEFICIT HYPERACTIVITY DISORDER (ADHD), COMBINED TYPE: ICD-10-CM

## 2024-02-12 PROCEDURE — 99213 OFFICE O/P EST LOW 20 MIN: CPT | Performed by: NURSE PRACTITIONER

## 2024-02-12 RX ORDER — CITALOPRAM 20 MG/1
TABLET ORAL
Qty: 60 TABLET | Refills: 1 | Status: SHIPPED | OUTPATIENT
Start: 2024-02-12

## 2024-02-12 RX ORDER — METHYLPREDNISOLONE SODIUM SUCCINATE 125 MG/2ML
125 INJECTION, POWDER, LYOPHILIZED, FOR SOLUTION INTRAMUSCULAR; INTRAVENOUS ONCE
Status: DISCONTINUED | OUTPATIENT
Start: 2024-02-12 | End: 2024-02-12

## 2024-04-09 PROBLEM — F90.2 ATTENTION DEFICIT HYPERACTIVITY DISORDER (ADHD), COMBINED TYPE: Status: ACTIVE | Noted: 2024-04-09

## 2024-04-09 PROBLEM — F32.2 CURRENT SEVERE EPISODE OF MAJOR DEPRESSIVE DISORDER WITHOUT PSYCHOTIC FEATURES: Status: ACTIVE | Noted: 2024-04-09

## 2024-04-09 PROBLEM — R05.1 ACUTE COUGH: Status: ACTIVE | Noted: 2024-04-09

## 2024-04-11 DIAGNOSIS — I10 ESSENTIAL HYPERTENSION: ICD-10-CM

## 2024-04-11 RX ORDER — METOPROLOL SUCCINATE 100 MG/1
100 TABLET, EXTENDED RELEASE ORAL DAILY
Qty: 90 TABLET | Refills: 0 | Status: SHIPPED | OUTPATIENT
Start: 2024-04-11

## 2024-05-06 RX ORDER — PANTOPRAZOLE SODIUM 40 MG/1
40 TABLET, DELAYED RELEASE ORAL DAILY
Qty: 90 TABLET | Refills: 3 | Status: SHIPPED | OUTPATIENT
Start: 2024-05-06

## 2024-05-09 ENCOUNTER — TELEPHONE (OUTPATIENT)
Dept: FAMILY MEDICINE CLINIC | Facility: CLINIC | Age: 34
End: 2024-05-09
Payer: COMMERCIAL

## 2024-05-22 DIAGNOSIS — I10 ESSENTIAL HYPERTENSION: ICD-10-CM

## 2024-05-22 RX ORDER — METOPROLOL SUCCINATE 100 MG/1
100 TABLET, EXTENDED RELEASE ORAL DAILY
Qty: 90 TABLET | Refills: 0 | Status: SHIPPED | OUTPATIENT
Start: 2024-05-22

## 2024-06-04 DIAGNOSIS — F32.2 CURRENT SEVERE EPISODE OF MAJOR DEPRESSIVE DISORDER WITHOUT PSYCHOTIC FEATURES, UNSPECIFIED WHETHER RECURRENT: ICD-10-CM

## 2024-06-04 RX ORDER — CITALOPRAM 20 MG/1
20 TABLET ORAL EVERY MORNING
Qty: 90 TABLET | Refills: 1 | Status: SHIPPED | OUTPATIENT
Start: 2024-06-04

## 2024-06-04 RX ORDER — PANTOPRAZOLE SODIUM 40 MG/1
40 TABLET, DELAYED RELEASE ORAL DAILY
Qty: 90 TABLET | Refills: 3 | Status: SHIPPED | OUTPATIENT
Start: 2024-06-04

## 2024-06-10 NOTE — PROGRESS NOTES
Subjective    PATIENT ID: Rosceo Arias, :1990, MRN: [id number]    Chief Complaint   Patient presents with    ADHD     HPI:   33 y.o. patient pt presents to [facility] 2024 at the request of Libby Souza APRN. Psychiatric history listed below, patient presents today with S/S of ADHD.      ADHD:  Patient endorses signs/symptoms that are highly indicative of ADHD. S/S were first noticed in childhood, occur most days of the week and are observable by others. Symptoms cause significant distress or negatively impact patient quality of life. S/S impact patient in at least (2) different areas of life including their personal, social, financial and educational areas. S/S are not better explained by another condition whether psychological or medical.    Pt endorses a prior diagnosis of ADHD in adulthood at age 33 years old, supporting documentation showing prior diagnosis/treatment has been reviewed , and no prior history of treatment for ADHD reported.  Neuropsych testing from [facility] reviewed and scanned under media confirming diagnosis, patient educated on different treatment options with stimulants versus nonstimulants, patient interested in starting stimulant.    Patient is a  and works high stress jobs works 24-hour shifts, on average she works 5 days a week, focus/concentration/poor organization affects work and at home reports he has almost burned down his kitchen by leaving the coffee pot on, symptoms are exacerbated by poor sleep depression/anxiety, neuropsych testing shows PTSD diagnosis.  Patient gets yearly EKGs last 1 reviewed and is normal, patient gets stress test every 3 years last one was normal.    Patient reports he is planning on getting  in March, would like to have a surprise honeymoon to [place] for [place], reports spouse is a big fan.    History of cardiovascular disease: denies  History of Drug/ETOH abuse: pt denies  "history of or past tx of, pt reports minor/occasional use of ETOH,, \"pt reports minor use of caffeine, and denies use of illegal substance/street drugs/THC     PSYCHIATRIC HX:  -Previous psych medications: BuSpar, Celexa, trazodone  -Previous diagnoses:ADHD, Depression, PTSD  -Previous therapy/treatment: Medications, One-on-one counseling  -Previous psychiatric hospitalizations: Denies  -Previous suicide attempts/self harm: Denies  -History of trauma/abuse: Traumatic Event: Works high stress job as a     SOCIAL/SEXUAL/SUBSTANCE HX:    -Narrative: Pt was born/raised in [place]. Pt describes childhood as normal.  -Relationships: Significant other, name: Winnie, Children: 2, aged 2 and 4 months, Currently living with spouse  -Employment: Full Time , Employer: [employer], Work Hours: Varies/changes, Highest level of education: High School Graduate  -Other:Gnosticism: Not discussed in detail, Exercise: Physical activity at work    -Alcohol: Occasionally on weekends  -Nicotine: Never  -Caffeine: Coffee 1-2 a day  -THC/CBD: Denies  -Street Drugs: Denies    Family History   Problem Relation Age of Onset    Obesity Mother     Hypertension Mother     Sleep apnea Mother     Obesity Sister     Hypertension Sister     Diabetes Maternal Grandmother     Hypertension Maternal Grandmother     Obesity Maternal Grandfather     Hypertension Maternal Grandfather     Diabetes Paternal Grandmother     Hypertension Paternal Grandmother     Cancer Paternal Grandfather     Malig Hyperthermia Neg Hx     Colon cancer Neg Hx     Colon polyps Neg Hx     Crohn's disease Neg Hx     Irritable bowel syndrome Neg Hx     Ulcerative colitis Neg Hx          PAST MEDICAL HISTORY:  Past Medical History:   Diagnosis Date    Sleep apnea 06/01/2021    Current severe episode of major depressive disorder without psychotic features 04/09/2024    Hypertension     PTSD (post-traumatic stress disorder)      Past Medical History Pertinent " "Negatives:   Diagnosis Date Noted    Alcohol abuse 06/11/2024    Heart disease 06/11/2024    Substance abuse 06/11/2024    Suicide attempt 06/11/2024     Past Surgical History:   Procedure Laterality Date    CHOLECYSTECTOMY WITH INTRAOPERATIVE CHOLANGIOGRAM N/A 04/03/2022    Procedure: Laparoscopic cholecystectomy with cholangiogram;  Surgeon: Maddi Peter MD;  Location: [facility] OR;  Service: General;  Laterality: N/A;    ERCP N/A 04/04/2022    Procedure: ENDOSCOPIC RETROGRADE CHOLANGIOPANCREATOGRAPHY WITH SPHINCTEROTOMY AND BALLOON SWEEP;  Surgeon: Colin Godwin MD;  Location: [facility] ENDOSCOPY;  Service: Gastroenterology;  Laterality: N/A;  PRE- COMMON BILE DUCT STONES  POST- SAME    GASTRIC SLEEVE LAPAROSCOPIC  11/08/2021    in [place]        Review of Systems   Constitutional: Negative.    Respiratory:  Negative for chest tightness and shortness of breath.    Cardiovascular:  Negative for chest pain.   Neurological:  Negative for dizziness and light-headedness.   Psychiatric/Behavioral:  Positive for decreased concentration, sleep disturbance, depressed mood and stress. Negative for suicidal ideas. The patient is nervous/anxious.            Objective   Visit Vitals  /92   Pulse 63   Resp 16   Ht 190.5 cm (75\")   Wt (!) 137 kg (301 lb)   SpO2 97%   BMI 37.62 kg/m²     Wt Readings from Last 3 Encounters:   06/11/24 (!) 137 kg (301 lb)   02/12/24 (!) 142 kg (312 lb 6.4 oz)   01/16/24 (!) 139 kg (306 lb 3.2 oz)     BP Readings from Last 3 Encounters:   06/11/24 144/92   02/12/24 126/86   01/16/24 126/84     Pulse Readings from Last 3 Encounters:   06/11/24 63   02/12/24 (!) 46   01/16/24 55      PHYSICAL EXAM:  Constitutional:       Appearance: Normal appearance.   HENT:      Head: Normocephalic.   Pulmonary:      Effort: Pulmonary effort is normal.   Skin:     General: Skin is dry.   Neurological:      Mental Status: The patient is alert and oriented to person, place, and time.      MENTAL " STATUS EXAM   General Appearance:  Cleanly groomed and dressed  Eye Contact:  Good eye contact  Attitude:  Cooperative  Speech:  Normal rate, tone, volume  Language:  Spontaneous  Mood and affect:  Normal, pleasant  Thought Process:  Logical  Associations/ Thought Content:  No delusions  Hallucinations:  None  Suicidal Ideations:  Not present  Homicidal Ideation:  Not present  Sensorium:  Alert  Orientation:  Person, place, time and situation  Attention Span/ Concentration:  Good  Fund of Knowledge:  Appropriate for age and educational level  Intellectual Functioning:  Average range  Insight:  Fair  Judgement:  Fair  Reliability:  Fair      PHQ-9 Depression Screening  Little interest or pleasure in doing things? 2-->more than half the days   Feeling down, depressed, or hopeless? 1-->several days   Trouble falling or staying asleep, or sleeping too much? 3-->nearly every day   Feeling tired or having little energy?     Poor appetite or overeating? 3-->nearly every day   Feeling bad about yourself/you are a failure/have let yourself or your family down? 2-->more than half the days   Trouble concentrating on things? 3-->nearly every day   Psychomotor agitation/retardation 1-->several days   Thoughts about death/dying/suicide 0-->not at all   PHQ-9 Total Score 18   How difficult have these problems for you?       GAD7 Documentation:  Feeling nervous, anxious or on edge 2   Not being able to stop or control worrying 2   Worrying too much about different things 2   Trouble relaxing 3   Being so restless that it is hard to sit still 3   Becoming easily annoyed or irritable 3   Feeling Afraid as if something awful might happen 1   EDEN Total Score 16   How difficult have these problems made it for you?       LABS:  Lab Results   Component Value Date    GLUCOSE 85 04/11/2024    BUN 13 04/11/2024    CREATININE 1.09 04/11/2024    EGFRRESULT 114.1 01/16/2024    EGFR 91.9 04/11/2024    BCR 14.3 01/16/2024    K 4.0 04/11/2024     CO2 28.9 04/11/2024    CALCIUM 9.7 04/11/2024    PROTENTOTREF 6.7 01/16/2024    ALBUMIN 4.4 04/11/2024    BILITOT 0.8 04/11/2024    AST 17 04/11/2024    ALT 22 04/11/2024     CBC          10/3/2023    09:06 1/16/2024    08:47 4/11/2024    09:02   CBC   WBC 6.58  6.78  6.15    RBC 5.50  4.99  5.27    Hemoglobin 15.6  13.8  14.8    Hematocrit 46.3  42.2  44.3    MCV 84.2  84.6  84.1    MCH 28.4  27.7  28.1    MCHC 33.7  32.7  33.4    RDW 13.2  13.0  13.3    Platelets 207  203  183      Lipid Panel          10/3/2023    09:06 1/16/2024    08:47 4/11/2024    09:02   Lipid Panel   Total Cholesterol   194    Total Cholesterol 177  163     Triglycerides 136  97  126    HDL Cholesterol 44  39  43    VLDL Cholesterol 24  18  23    LDL Cholesterol  109  106  128    LDL/HDL Ratio   2.93        No Known Allergies    Current Outpatient Medications   Medication Instructions    busPIRone (BUSPAR) 5 mg, Oral, 3 Times Daily    citalopram (CELEXA) 20 mg, Oral, Every Morning    colestipol (COLESTID) 1 g tablet Start with 1 tab twice daily and may increase by 1 tab per week based on bowel habits; max dose 6 tabs/day    lisdexamfetamine (VYVANSE) 30 mg, Oral, Every Morning    metoprolol succinate XL (TOPROL-XL) 100 mg, Oral, Daily    naproxen (EC-Naproxen) 500 MG EC tablet TAKE ONE TABLET BY MOUTH TWO TIMES A DAY AS NEEDED FOR MILD PAIN    pantoprazole (PROTONIX) 40 mg, Oral, Daily    traZODone (DESYREL) 50 mg, Oral, Nightly, Do not take on nights you are working.     Assessment    ASSESSMENT/DIAGNOSIS/TREATMENT PLAN    (F90.2) Attention deficit hyperactivity disorder (ADHD), combined type -newly identified - plan: lisdexamfetamine (Vyvanse) 30 MG capsule    (Z79.899) High risk medication use -controlled substance agreement signed, William reviewed - plan: Start lisdexamfetamine (Vyvanse) 30 MG capsule     FOLLOW UP: Return in about 4 weeks (around 7/9/2024) for Recheck, IN PERSON.    ADDITIONAL MONITORING FOR CONTROLLED  SUBSTANCE:  -Narc Contract: 6/24  -PDMP via EMR interface reviewed 06/11/2024  -UDS: random     -Without the prescribed controlled substance, it is reported that the patient has problems with the treated mental health condition. Due to the reported problems without the medication usage, as well as the significant improvement in symptoms with the medication usage, the patient requests to remain on the prescribed medication.    -Stimulant/ADHD General Instructions:  Patient educated that they must call every month when they have 3 days left of medication to request a refill [phone].  Patient educated that prescribing will not continue unless follow-up appointments are maintained.  Prescriptions can only be sent to a [place] pharmacy, 30-day supply no refills.  Monitor for appetite suppression/weight loss, worsening anxiety, worsening sleep.  Remember to stay hydrated and snack throughout the day to avoid afternoon crash.  Any cardiovascular symptoms including chest pain, shortness of breath, dizziness, lightheadedness, stop medication and go to nearest emergency room or call 911.  Patient has been educated on the risk versus benefit of being prescribed a controlled substance, patient has been educated on additional monitoring that is required including William reports, random urine drug screens, in person appt's and pill counts. Controlled substance agreement renewed yearly. Patients are seen regularly while on a controlled substance.  Patient has been educated on nonstimulant options that carry with a much lower risk, and require less monitoring.        There are no discontinued medications.    New Medications Ordered This Visit   Medications    lisdexamfetamine (Vyvanse) 30 MG capsule     Sig: Take 1 capsule by mouth Every Morning     Dispense:  30 capsule     Refill:  0      No orders of the defined types were placed in this encounter.    -Barriers: multiple psychiatric comorbidities  and stress  -Strengths:  humor  and motivated     -Short-Term Goals: Pt will be compliant with medication management and note improvement in S/S over the next 4 to 6 weeks or at next scheduled visit.  -Long-Term Goals: Pt will be compliant with the agreed treatment plan including medication regimen & F/U appt's and deny impairment in daily functioning over the next 6 months.      -Progress toward goal: Not at goal  -Functional Status: Moderate impairment   -Prognosis: Fair with Ongoing Treatment        SUMMARY/EDUCATION/DISCUSSION:  -Pt was given appropriate time to ask questions and concerns were addressed. A thorough discussion was had that included review of disease process, need for continued monitoring and additional treatment options including use of pharmacological and non-pharmacological approaches to care, decisions were made and agreed upon by patient and provider.   -Discussed the risks, benefits, and potential side effects of the medications; patient ackowledged and verbally consented.   -Please call the office at [phone] with any worsening of symptoms or onset of intolerable side effects, please ask to leave a message with Douglas's medical assistant.  Please give my office up to 48 hours to respond to a patient call/question/refill request.  -Patient is agreeable to call 911 or go to the nearest ER should he/she/they have any thoughts of harm to self or others.  -Patient has been educated regarding multimodal approach with healthy nutrition, healthy sleep, regular physical activity, social activities, counseling, and medications.     Part of this note may be an electronic transcription/translation of spoken language to printed text using the Dragon Dictation System. Some of the data in this electronic note has been brought forward from a previous encounter, any necessary changes have been made, it has been reviewed by this APRN, and it is accurate.    This document has been electronically signed by MAURILIO Lowe June 11,  2024 13:16 EDT

## 2024-06-11 ENCOUNTER — OFFICE VISIT (OUTPATIENT)
Dept: BEHAVIORAL HEALTH | Facility: CLINIC | Age: 34
End: 2024-06-11
Payer: COMMERCIAL

## 2024-06-11 VITALS
OXYGEN SATURATION: 97 % | SYSTOLIC BLOOD PRESSURE: 144 MMHG | DIASTOLIC BLOOD PRESSURE: 92 MMHG | RESPIRATION RATE: 16 BRPM | HEIGHT: 75 IN | HEART RATE: 63 BPM | BODY MASS INDEX: 37.42 KG/M2 | WEIGHT: 301 LBS

## 2024-06-11 DIAGNOSIS — F90.2 ATTENTION DEFICIT HYPERACTIVITY DISORDER (ADHD), COMBINED TYPE: Primary | ICD-10-CM

## 2024-06-11 DIAGNOSIS — Z79.899 HIGH RISK MEDICATION USE: ICD-10-CM

## 2024-06-11 PROCEDURE — 90792 PSYCH DIAG EVAL W/MED SRVCS: CPT

## 2024-06-11 RX ORDER — LISDEXAMFETAMINE DIMESYLATE 30 MG/1
30 CAPSULE ORAL EVERY MORNING
Qty: 30 CAPSULE | Refills: 0 | Status: SHIPPED | OUTPATIENT
Start: 2024-06-11

## 2024-06-11 NOTE — PATIENT INSTRUCTIONS
-Stimulant/ADHD General Instructions:  Patient educated that they must call every month when they have 3 days left of medication to request a refill [phone].  Patient educated that prescribing will not continue unless follow-up appointments are maintained.  Prescriptions can only be sent to a [place] pharmacy, 30-day supply no refills.  Monitor for appetite suppression/weight loss, worsening anxiety, worsening sleep.  Remember to stay hydrated and snack throughout the day to avoid afternoon crash.  Any cardiovascular symptoms including chest pain, shortness of breath, dizziness, lightheadedness, stop medication and go to nearest emergency room or call 911.  Patient has been educated on the risk versus benefit of being prescribed a controlled substance, patient has been educated on additional monitoring that is required including William reports, random urine drug screens, in person appt's and pill counts. Controlled substance agreement renewed yearly. Patients are seen regularly while on a controlled substance.  Patient has been educated on nonstimulant options that carry with a much lower risk, and require less monitoring.     GENERAL NEW PATIENT INSTRUCTIONS:    -Please arrive in person or virtually 10-15 minutes prior to appointment to allow for registration/sign in/questionnaires. If you are seen virtually please review after visit summary (AVS)/patient instructions via [email or web address] for a summary of plan of care/changes in treatment plan. If you are having difficulties logging on or accessing [email or web address] please contact my office for assistance.    -The best way to get a hold of Douglas is to call the office at [phone] and ask to leave a message with his medical assistant. Douglas Monzon is a Psychiatric Mental Health Nurse Practitioner, due to his specialty patient's are not able to message him directly via [email or web address]. Please give my office up to 48 hours to respond to a patient call/question/refill request. Refill  requests will be made during normal office hours only, Monday-Friday 8:00-5:30.      -Douglas is out of the office on Fridays and weekends. Tuesdays and Thursdays are Douglas's in-office days, Mondays and Wednesdays are his telehealth days.  The decision to be seen virtually or in person is up to the discretion of the provider, not all behavioral health problems are appropriate for telehealth.    -Please call the office at [phone] with any worsening of symptoms or onset of intolerable side effects.  -Follow-up appointments must be maintained in order for prescribing to continue.  -Patient has been educated regarding multimodal approach with healthy nutrition, healthy sleep, regular physical activity, social activities, counseling, and medications.   -Please call 911 or go to the nearest ER if you begin to have thoughts of harming yourself or other people.    No show policy:  We understand unexpected circumstances arise; however, anytime you miss your appointment we are unable to provide you appropriate care.  In addition, each appointment missed could have been used to provide care for others.  We ask that you call at least 24 hours in advance to cancel or reschedule an appointment. We would like to take this opportunity to remind you of our policy stating patients who miss THREE or more appointments without cancelling or rescheduling 24 hours in advance of the appointment may be subject to cancellation of any further visits with our clinic and recommendation to seek in-person services/visits. Please call [phone] to reschedule your appointment. If there are reasons that make it difficult for you to keep the appointments, please call and let us know how we can help. Please understand that medication prescribing will not continue without seeing your provider.

## 2024-07-11 ENCOUNTER — OFFICE VISIT (OUTPATIENT)
Dept: BEHAVIORAL HEALTH | Facility: CLINIC | Age: 34
End: 2024-07-11
Payer: COMMERCIAL

## 2024-07-11 VITALS
BODY MASS INDEX: 37.05 KG/M2 | OXYGEN SATURATION: 98 % | HEIGHT: 75 IN | DIASTOLIC BLOOD PRESSURE: 90 MMHG | WEIGHT: 298 LBS | HEART RATE: 72 BPM | SYSTOLIC BLOOD PRESSURE: 128 MMHG

## 2024-07-11 DIAGNOSIS — Z79.899 HIGH RISK MEDICATION USE: ICD-10-CM

## 2024-07-11 DIAGNOSIS — F90.2 ATTENTION DEFICIT HYPERACTIVITY DISORDER (ADHD), COMBINED TYPE: Primary | ICD-10-CM

## 2024-07-11 RX ORDER — LISDEXAMFETAMINE DIMESYLATE 40 MG/1
40 CAPSULE ORAL EVERY MORNING
Qty: 30 CAPSULE | Refills: 0 | Status: SHIPPED | OUTPATIENT
Start: 2024-07-11

## 2024-07-11 NOTE — PATIENT INSTRUCTIONS
-Stimulant/ADHD General Instructions:  Patient educated that they must call every month when they have 3 days left of medication to request a refill [phone].  Patient educated that prescribing will not continue unless follow-up appointments are maintained.  Prescriptions can only be sent to a [place] pharmacy, 30-day supply no refills.  Monitor for appetite suppression/weight loss, worsening anxiety, worsening sleep.  Remember to stay hydrated and snack throughout the day to avoid afternoon crash.  Any cardiovascular symptoms including chest pain, shortness of breath, dizziness, lightheadedness, stop medication and go to nearest emergency room or call 911.  Patient has been educated on the risk versus benefit of being prescribed a controlled substance, patient has been educated on additional monitoring that is required including William reports, random urine drug screens, in person appt's and pill counts. Controlled substance agreement renewed yearly. Patients are seen regularly while on a controlled substance.  Patient has been educated on nonstimulant options that carry with a much lower risk, and require less monitoring.     GENERAL NEW PATIENT INSTRUCTIONS:    -Please arrive in person or virtually 10-15 minutes prior to appointment to allow for registration/sign in/questionnaires. If you are seen virtually please review after visit summary (AVS)/patient instructions via [employer] for a summary of plan of care/changes in treatment plan. If you are having difficulties logging on or accessing [employer] please contact my office for assistance.    -The best way to get a hold of Douglas is to call the office at [phone] and ask to leave a message with his medical assistant. Douglas Monzon is a Psychiatric Mental Health Nurse Practitioner, due to his specialty patient's are not able to message him directly via [employer]. Please give my office up to 48 hours to respond to a patient call/question/refill request. Refill  requests will be made during normal office hours only, Monday-Friday 8:00-5:30.      -Douglas is out of the office on Fridays and weekends. Tuesdays and Thursdays are Douglas's in-office days, Mondays and Wednesdays are his telehealth days.  The decision to be seen virtually or in person is up to the discretion of the provider, not all behavioral health problems are appropriate for telehealth.    -Please call the office at [phone] with any worsening of symptoms or onset of intolerable side effects.  -Follow-up appointments must be maintained in order for prescribing to continue.  -Patient has been educated regarding multimodal approach with healthy nutrition, healthy sleep, regular physical activity, social activities, counseling, and medications.   -Please call 911 or go to the nearest ER if you begin to have thoughts of harming yourself or other people.    No show policy:  We understand unexpected circumstances arise; however, anytime you miss your appointment we are unable to provide you appropriate care.  In addition, each appointment missed could have been used to provide care for others.  We ask that you call at least 24 hours in advance to cancel or reschedule an appointment. We would like to take this opportunity to remind you of our policy stating patients who miss THREE or more appointments without cancelling or rescheduling 24 hours in advance of the appointment may be subject to cancellation of any further visits with our clinic and recommendation to seek in-person services/visits. Please call [phone] to reschedule your appointment. If there are reasons that make it difficult for you to keep the appointments, please call and let us know how we can help. Please understand that medication prescribing will not continue without seeing your provider.

## 2024-07-11 NOTE — PROGRESS NOTES
Subjective      Chief Complaint   Patient presents with    ADHD     HPI:  Roscoe Arias, 33 y.o. pt presents to [facility] on 07/11/2024 for F/U, pt seen today for psychiatric med management of ADHD.  Patient seen for the first time roughly 1 month ago Vyvanse 30 mg was started for ADHD, since then patient reports medication has been very helpful takes it 7 in the morning but notices it wearing off around 2/3/4, patient is a  and works 24-hour shifts, patient educated go with medicine is to cover him for most of the day but a realistic goal would not be more than 12 to 16 hours, patient verbalized understanding, patient has lost 10 pounds reports he is snacking less and trying to eat better, patient reports medication is not affecting his sleep, patient denies all cardiovascular S/S agreed to increase Vyvanse to 40 mg and follow-up in 1 month, vital signs reviewed and are normal.    SOCIAL/SEXUAL/SUBSTANCE HX:    -Narrative: Pt was born/[place]. Pt describes childhood as normal.  -Relationships: Significant other, name: Winnie, Children: 2, aged 2 and 4 months, Currently living with spouse  -Employment: Full Time , Employer: [employer], Work Hours: Varies/changes, Highest level of education: High School Graduate  -Other:Mosque: Not discussed in detail, Exercise: Physical activity at work    -Alcohol: Occasionally on weekends  -Nicotine: Never  -Caffeine: Coffee 1-2 a day  -THC/CBD: Denies  -Street Drugs: Denies    Past Medical History:   Diagnosis Date    Sleep apnea 06/01/2021    Current severe episode of major depressive disorder without psychotic features 04/09/2024    Hypertension     PTSD (post-traumatic stress disorder)      Past Medical History Pertinent Negatives:   Diagnosis Date Noted    Alcohol abuse 06/11/2024    Heart disease 06/11/2024    Substance abuse 06/11/2024    Suicide attempt 06/11/2024     Review of Systems   Constitutional:  Positive for  "unexpected weight loss.   Respiratory:  Negative for chest tightness and shortness of breath.    Cardiovascular:  Negative for chest pain.   Neurological:  Negative for dizziness and light-headedness.   Psychiatric/Behavioral:  Positive for decreased concentration and stress. Negative for sleep disturbance and suicidal ideas.            Objective   Visit Vitals  /90   Pulse 72   Ht 190.5 cm (75\")   Wt 135 kg (298 lb)   SpO2 98%   BMI 37.25 kg/m²     Wt Readings from Last 3 Encounters:   07/11/24 135 kg (298 lb)   06/11/24 (!) 137 kg (301 lb)   02/12/24 (!) 142 kg (312 lb 6.4 oz)     BP Readings from Last 3 Encounters:   07/11/24 128/90   06/11/24 144/92   02/12/24 126/86     Pulse Readings from Last 3 Encounters:   07/11/24 72   06/11/24 63   02/12/24 (!) 46      PHYSICAL EXAM:  Constitutional:       Appearance: Normal appearance.   HENT:      Head: Normocephalic.   Pulmonary:      Effort: Pulmonary effort is normal.   Skin:     General: Skin is dry.   Neurological:      Mental Status: The patient is alert and oriented to person, place, and time.      MENTAL STATUS EXAM   General Appearance:  Cleanly groomed and dressed  Eye Contact:  Good eye contact  Attitude:  Cooperative  Speech:  Normal rate, tone, volume  Language:  Spontaneous  Mood and affect:  Normal, pleasant  Thought Process:  Logical  Associations/ Thought Content:  No delusions  Hallucinations:  None  Suicidal Ideations:  Not present  Homicidal Ideation:  Not present  Sensorium:  Alert  Orientation:  Person, place, time and situation  Attention Span/ Concentration:  Good  Fund of Knowledge:  Appropriate for age and educational level  Intellectual Functioning:  Average range  Insight:  Fair  Judgement:  Fair  Reliability:  Fair    LABS:  Lab Results   Component Value Date    GLUCOSE 85 04/11/2024    BUN 13 04/11/2024    CREATININE 1.09 04/11/2024    EGFRRESULT 114.1 01/16/2024    EGFR 91.9 04/11/2024    BCR 14.3 01/16/2024    K 4.0 04/11/2024    " CO2 28.9 04/11/2024    CALCIUM 9.7 04/11/2024    PROTENTOTREF 6.7 01/16/2024    ALBUMIN 4.4 04/11/2024    BILITOT 0.8 04/11/2024    AST 17 04/11/2024    ALT 22 04/11/2024     No Known Allergies    Current Outpatient Medications   Medication Instructions    busPIRone (BUSPAR) 5 mg, Oral, 3 Times Daily    citalopram (CELEXA) 20 mg, Oral, Every Morning    colestipol (COLESTID) 1 g tablet Start with 1 tab twice daily and may increase by 1 tab per week based on bowel habits; max dose 6 tabs/day    lisdexamfetamine (VYVANSE) 40 mg, Oral, Every Morning    metoprolol succinate XL (TOPROL-XL) 100 mg, Oral, Daily    naproxen (EC-Naproxen) 500 MG EC tablet TAKE ONE TABLET BY MOUTH TWO TIMES A DAY AS NEEDED FOR MILD PAIN    pantoprazole (PROTONIX) 40 mg, Oral, Daily    traZODone (DESYREL) 50 mg, Oral, Nightly, Do not take on nights you are working.     Assessment    ASSESSMENT/DIAGNOSIS/TREATMENT PLAN    (F90.2) Attention deficit hyperactivity disorder (ADHD), combined type - Plan: lisdexamfetamine (Vyvanse) 40 MG capsule    (Z79.899) High risk medication use - Plan: lisdexamfetamine (Vyvanse) 40 MG capsule    FOLLOW UP: Return in about 4 weeks (around 8/8/2024) for Recheck.    ADDITIONAL MONITORING FOR CONTROLLED SUBSTANCE:  -Narc Contract: 6/24  -PDMP via EMR interface reviewed 07/11/2024  -UDS: random     -Without the prescribed controlled substance, it is reported that the patient has problems with the treated mental health condition. Due to the reported problems without the medication usage, as well as the significant improvement in symptoms with the medication usage, the patient requests to remain on the prescribed medication.    -Stimulant/ADHD General Instructions:  Patient educated that they must call every month when they have 3 days left of medication to request a refill [phone].  Patient educated that prescribing will not continue unless follow-up appointments are maintained.  Prescriptions can only be sent to  a [place] pharmacy, 30-day supply no refills.  Monitor for appetite suppression/weight loss, worsening anxiety, worsening sleep.  Remember to stay hydrated and snack throughout the day to avoid afternoon crash.  Any cardiovascular symptoms including chest pain, shortness of breath, dizziness, lightheadedness, stop medication and go to nearest emergency room or call 911.  Patient has been educated on the risk versus benefit of being prescribed a controlled substance, patient has been educated on additional monitoring that is required including William reports, random urine drug screens, in person appt's and pill counts. Controlled substance agreement renewed yearly. Patients are seen regularly while on a controlled substance.  Patient has been educated on nonstimulant options that carry with a much lower risk, and require less monitoring.        Medications Discontinued During This Encounter   Medication Reason    lisdexamfetamine (Vyvanse) 30 MG capsule      New Medications Ordered This Visit   Medications    lisdexamfetamine (Vyvanse) 40 MG capsule     Sig: Take 1 capsule by mouth Every Morning     Dispense:  30 capsule     Refill:  0        No orders of the defined types were placed in this encounter.    -Barriers: multiple psychiatric comorbidities  and stress  -Strengths:  humor and motivated     -Short-Term Goals: Pt will be compliant with medication management and note improvement in S/S over the next 4 to 6 weeks or at next scheduled visit.  -Long-Term Goals: Pt will be compliant with the agreed treatment plan including medication regimen & F/U appt's and deny impairment in daily functioning over the next 6 months.      -Progress toward goal: Not at goal  -Functional Status: Moderate impairment   -Prognosis: Fair with Ongoing Treatment        SUMMARY/EDUCATION/DISCUSSION:  -Pt was given appropriate time to ask questions and concerns were addressed. A thorough discussion was had that included review of disease  process, need for continued monitoring and additional treatment options including use of pharmacological and non-pharmacological approaches to care, decisions were made and agreed upon by patient and provider.   -Discussed the risks, benefits, and potential side effects of the medications; patient ackowledged and verbally consented.   -Please call the office at [phone] with any worsening of symptoms or onset of intolerable side effects, please ask to leave a message with Douglas's medical assistant.  Please give my office up to 48 hours to respond to a patient call/question/refill request.  -Patient is agreeable to call 911 or go to the nearest ER should he/she/they have any thoughts of harm to self or others.  -Patient has been educated regarding multimodal approach with healthy nutrition, healthy sleep, regular physical activity, social activities, counseling, and medications.     Part of this note may be an electronic transcription/translation of spoken language to printed text using the Dragon Dictation System. Some of the data in this electronic note has been brought forward from a previous encounter, any necessary changes have been made, it has been reviewed by this APRN, and it is accurate.    This document has been electronically signed by MAURILIO Lowe July 11, 2024 09:19 EDT

## 2024-07-17 ENCOUNTER — OFFICE VISIT (OUTPATIENT)
Dept: FAMILY MEDICINE CLINIC | Facility: CLINIC | Age: 34
End: 2024-07-17
Payer: COMMERCIAL

## 2024-07-17 VITALS
HEART RATE: 66 BPM | WEIGHT: 303.4 LBS | SYSTOLIC BLOOD PRESSURE: 118 MMHG | HEIGHT: 75 IN | BODY MASS INDEX: 37.72 KG/M2 | DIASTOLIC BLOOD PRESSURE: 72 MMHG | OXYGEN SATURATION: 96 %

## 2024-07-17 DIAGNOSIS — E78.2 MIXED HYPERLIPIDEMIA: ICD-10-CM

## 2024-07-17 DIAGNOSIS — F90.2 ATTENTION DEFICIT HYPERACTIVITY DISORDER (ADHD), COMBINED TYPE: ICD-10-CM

## 2024-07-17 DIAGNOSIS — F34.1 PERSISTENT DEPRESSIVE DISORDER: ICD-10-CM

## 2024-07-17 DIAGNOSIS — F51.01 PRIMARY INSOMNIA: ICD-10-CM

## 2024-07-17 DIAGNOSIS — I10 PRIMARY HYPERTENSION: ICD-10-CM

## 2024-07-17 DIAGNOSIS — E66.01 MORBID (SEVERE) OBESITY DUE TO EXCESS CALORIES: Primary | ICD-10-CM

## 2024-07-17 DIAGNOSIS — F41.9 ANXIETY: ICD-10-CM

## 2024-07-17 DIAGNOSIS — G47.33 OSA (OBSTRUCTIVE SLEEP APNEA): ICD-10-CM

## 2024-07-17 LAB
ALBUMIN SERPL-MCNC: 4.3 G/DL (ref 3.5–5.2)
ALBUMIN/GLOB SERPL: 1.7 G/DL
ALP SERPL-CCNC: 57 U/L (ref 39–117)
ALT SERPL-CCNC: 20 U/L (ref 1–41)
AST SERPL-CCNC: 21 U/L (ref 1–40)
BASOPHILS # BLD AUTO: 0.04 10*3/MM3 (ref 0–0.2)
BASOPHILS NFR BLD AUTO: 0.7 % (ref 0–1.5)
BILIRUB SERPL-MCNC: 0.8 MG/DL (ref 0–1.2)
BUN SERPL-MCNC: 11 MG/DL (ref 6–20)
BUN/CREAT SERPL: 11.2 (ref 7–25)
CALCIUM SERPL-MCNC: 9.4 MG/DL (ref 8.6–10.5)
CHLORIDE SERPL-SCNC: 102 MMOL/L (ref 98–107)
CHOLEST SERPL-MCNC: 170 MG/DL (ref 0–200)
CO2 SERPL-SCNC: 27.4 MMOL/L (ref 22–29)
CREAT SERPL-MCNC: 0.98 MG/DL (ref 0.76–1.27)
EGFRCR SERPLBLD CKD-EPI 2021: 104.4 ML/MIN/1.73
EOSINOPHIL # BLD AUTO: 0.26 10*3/MM3 (ref 0–0.4)
EOSINOPHIL NFR BLD AUTO: 4.4 % (ref 0.3–6.2)
ERYTHROCYTE [DISTWIDTH] IN BLOOD BY AUTOMATED COUNT: 13.2 % (ref 12.3–15.4)
GLOBULIN SER CALC-MCNC: 2.6 GM/DL
GLUCOSE SERPL-MCNC: 90 MG/DL (ref 65–99)
HBA1C MFR BLD: 5.2 % (ref 4.8–5.6)
HCT VFR BLD AUTO: 44 % (ref 37.5–51)
HDLC SERPL-MCNC: 40 MG/DL (ref 40–60)
HGB BLD-MCNC: 15.2 G/DL (ref 13–17.7)
IMM GRANULOCYTES # BLD AUTO: 0 10*3/MM3 (ref 0–0.05)
IMM GRANULOCYTES NFR BLD AUTO: 0 % (ref 0–0.5)
LDLC SERPL CALC-MCNC: 112 MG/DL (ref 0–100)
LYMPHOCYTES # BLD AUTO: 1.86 10*3/MM3 (ref 0.7–3.1)
LYMPHOCYTES NFR BLD AUTO: 31.4 % (ref 19.6–45.3)
MCH RBC QN AUTO: 29.4 PG (ref 26.6–33)
MCHC RBC AUTO-ENTMCNC: 34.5 G/DL (ref 31.5–35.7)
MCV RBC AUTO: 85.1 FL (ref 79–97)
MONOCYTES # BLD AUTO: 0.48 10*3/MM3 (ref 0.1–0.9)
MONOCYTES NFR BLD AUTO: 8.1 % (ref 5–12)
NEUTROPHILS # BLD AUTO: 3.29 10*3/MM3 (ref 1.7–7)
NEUTROPHILS NFR BLD AUTO: 55.4 % (ref 42.7–76)
NRBC BLD AUTO-RTO: 0 /100 WBC (ref 0–0.2)
PLATELET # BLD AUTO: 182 10*3/MM3 (ref 140–450)
POTASSIUM SERPL-SCNC: 4 MMOL/L (ref 3.5–5.2)
PROT SERPL-MCNC: 6.9 G/DL (ref 6–8.5)
RBC # BLD AUTO: 5.17 10*6/MM3 (ref 4.14–5.8)
SODIUM SERPL-SCNC: 139 MMOL/L (ref 136–145)
TRIGL SERPL-MCNC: 99 MG/DL (ref 0–150)
VLDLC SERPL CALC-MCNC: 18 MG/DL (ref 5–40)
WBC # BLD AUTO: 5.93 10*3/MM3 (ref 3.4–10.8)

## 2024-07-17 PROCEDURE — 99214 OFFICE O/P EST MOD 30 MIN: CPT | Performed by: NURSE PRACTITIONER

## 2024-07-17 NOTE — PROGRESS NOTES
Subjective   Roscoe Arias is a 33 y.o. male. Presents today for   Chief Complaint   Patient presents with    ADD     Follow up   Meds seem to be working ok    Anxiety     Follow up    Depression     Follow up       History Of Present Illness:  Roscoe presents today for 6 month follow up    ADHD:  Vyvanse - he has been on it for a month and can tell when it wears off around 5 pm.  Adjustments are being made    Anxiety/Depression:  Buspar and Celexa    Gout: No recent flares - he drinks water when he feels pain start to alleviate    GERD:  Pantoparazole - working well    Hypertension:  Metoprolol - well controlled    Hyperlipidemia:  Colestipol    Insomnia:  Trazodone    ISAIAH:  CPAP      Patient Active Problem List   Diagnosis    Chronic foot pain, left    Achilles tendon contracture due to non-neurologic cause    Essential hypertension    Class 3 severe obesity due to excess calories with serious comorbidity and body mass index (BMI) of 45.0 to 49.9 in adult    Sleep apnea    Gout    Acute calculous cholecystitis    Choledocholithiasis with acute cholecystitis with obstruction    Cholecystitis    Disease due to severe acute respiratory syndrome coronavirus 2 (SARS-CoV-2)    Current severe episode of major depressive disorder without psychotic features    Attention deficit hyperactivity disorder (ADHD), combined type    Acute cough    High risk medication use       Social History     Socioeconomic History    Marital status: Significant Other   Tobacco Use    Smoking status: Former     Current packs/day: 0.00     Average packs/day: 0.5 packs/day for 2.0 years (1.0 ttl pk-yrs)     Types: Cigarettes     Start date: 2009     Quit date: 2011     Years since quittin.1    Smokeless tobacco: Former     Types: Chew   Vaping Use    Vaping status: Never Used   Substance and Sexual Activity    Alcohol use: Yes     Comment: 2-3X WEEKLY    Drug use: Never    Sexual activity: Defer       No Known Allergies    Current  "Outpatient Medications on File Prior to Visit   Medication Sig Dispense Refill    busPIRone (BUSPAR) 5 MG tablet Take 1 tablet by mouth 3 (Three) Times a Day. 90 tablet 5    citalopram (CeleXA) 20 MG tablet Take 1 tablet by mouth Every Morning. 90 tablet 1    colestipol (COLESTID) 1 g tablet Start with 1 tab twice daily and may increase by 1 tab per week based on bowel habits; max dose 6 tabs/day 120 tablet 5    lisdexamfetamine (Vyvanse) 40 MG capsule Take 1 capsule by mouth Every Morning 30 capsule 0    metoprolol succinate XL (TOPROL-XL) 100 MG 24 hr tablet TAKE 1 TABLET BY MOUTH DAILY 90 tablet 0    naproxen (EC-Naproxen) 500 MG EC tablet TAKE ONE TABLET BY MOUTH TWO TIMES A DAY AS NEEDED FOR MILD PAIN 60 tablet 5    pantoprazole (PROTONIX) 40 MG EC tablet Take 1 tablet by mouth Daily. 90 tablet 3    traZODone (DESYREL) 50 MG tablet Take 1 tablet by mouth Every Night. Do not take on nights you are working. 30 tablet 5     No current facility-administered medications on file prior to visit.       Objective   Vitals:    07/17/24 0812   BP: 118/72   Pulse: 66   SpO2: 96%   Weight: (!) 138 kg (303 lb 6.4 oz)   Height: 190.5 cm (75\")     Body mass index is 37.92 kg/m².    Physical Exam  Constitutional:       Appearance: He is obese.   HENT:      Head: Normocephalic and atraumatic.      Mouth/Throat:      Mouth: Mucous membranes are moist.   Eyes:      Pupils: Pupils are equal, round, and reactive to light.   Cardiovascular:      Rate and Rhythm: Normal rate and regular rhythm.      Pulses: Normal pulses.      Heart sounds: Normal heart sounds.   Pulmonary:      Effort: Pulmonary effort is normal.      Breath sounds: Normal breath sounds.   Abdominal:      General: Bowel sounds are normal.   Musculoskeletal:         General: Normal range of motion.   Skin:     General: Skin is warm and dry.      Capillary Refill: Capillary refill takes less than 2 seconds.   Neurological:      General: No focal deficit present.      " Mental Status: He is alert and oriented to person, place, and time.   Psychiatric:         Mood and Affect: Mood normal.           Procedures     Assessment & Plan   Diagnoses and all orders for this visit:    1. Morbid (severe) obesity due to excess calories (Primary)  -     Hemoglobin A1c    2. Primary hypertension  -     CBC & Differential  -     Comprehensive Metabolic Panel    3. Mixed hyperlipidemia  -     Lipid Panel    4. Attention deficit hyperactivity disorder (ADHD), combined type    5. Anxiety    6. Persistent depressive disorder    7. Primary insomnia    8. ISAIAH (obstructive sleep apnea)        Plans:  Continue current plan of care.  Psych managing ADHD medications.  He is doing well on all his other medications.  Anxiety, Depression, Insomnia and ISAIAH are well controlled with treatment.                  No follow-ups on file.

## 2024-08-09 NOTE — PROGRESS NOTES
Subjective      Chief Complaint   Patient presents with    ADHD     HPI:  Roscoe Arias, 33 y.o. pt presents to [facility] on 2024 for F/U, pt seen today for psychiatric med management of ADHD, pt seen last 4 weeks prior vyvanse increased to 40 mg at that time.  Patient reports higher dose of Vyvanse has been very helpful in terms of concentration and focus, denies side effects, denies all cardiovascular S/S, patient reports things at home as well as work are fine continues to work as a , reports sleeping decently, PCP is writing all his other psych meds including BuSpar, Celexa, trazodone, patient gets no exercise outside of work, is trying to eat better, it appears she is lost 5 pounds.  It was agreed to keep Vyvanse at current dose, and follow-up in 3 months, Vyvanse is not listed on [facility] via epic link, my medical assistant contacted pharmacy to confirm Vyvanse was picked up last on 2024, advised they need to update [facility] website.    Social History     Socioeconomic History    Marital status: Significant Other   Tobacco Use    Smoking status: Former     Current packs/day: 0.00     Average packs/day: 0.5 packs/day for 2.0 years (1.0 ttl pk-yrs)     Types: Cigarettes     Start date: 2009     Quit date: 2011     Years since quittin.2    Smokeless tobacco: Former     Types: Chew   Vaping Use    Vaping status: Never Used   Substance and Sexual Activity    Alcohol use: Yes     Comment: 2-3X WEEKLY    Drug use: Never    Sexual activity: Defer     Past Medical History:   Diagnosis Date    Sleep apnea 2021    Current severe episode of major depressive disorder without psychotic features 2024    Hypertension     PTSD (post-traumatic stress disorder)      Past Medical History Pertinent Negatives:   Diagnosis Date Noted    Alcohol abuse 2024    Heart disease 2024    Substance abuse 2024    Suicide attempt 2024     Review of Systems  "  Constitutional: Negative.    Respiratory:  Negative for chest tightness and shortness of breath.    Cardiovascular:  Negative for chest pain.   Neurological:  Negative for dizziness and light-headedness.   Psychiatric/Behavioral:  Negative for sleep disturbance and suicidal ideas.            Objective   Visit Vitals  /98   Pulse 93   Ht 190.5 cm (75\")   Wt 128 kg (282 lb)   SpO2 97%   BMI 35.25 kg/m²     Wt Readings from Last 3 Encounters:   08/13/24 128 kg (282 lb)   07/17/24 (!) 138 kg (303 lb 6.4 oz)   07/11/24 135 kg (298 lb)     BP Readings from Last 3 Encounters:   08/13/24 152/98   07/17/24 118/72   07/11/24 128/90     Pulse Readings from Last 3 Encounters:   08/13/24 93   07/17/24 66   07/11/24 72      PHYSICAL EXAM:  Constitutional:       Appearance: Normal appearance.   HENT:      Head: Normocephalic.   Pulmonary:      Effort: Pulmonary effort is normal.   Skin:     General: Skin is dry.   Neurological:      Mental Status: The patient is alert and oriented to person, place, and time.      MENTAL STATUS EXAM   General Appearance:  Cleanly groomed and dressed  Eye Contact:  Good eye contact  Attitude:  Cooperative  Motor Activity:  Normal gait, posture  Speech:  Normal rate, tone, volume  Language:  Spontaneous  Mood and affect:  Normal, pleasant  Thought Process:  Logical  Suicidal Ideations:  Not present  Sensorium:  Alert  Orientation:  Person, place, time and situation  Attention Span/ Concentration:  Good  Fund of Knowledge:  Appropriate for age and educational level  Intellectual Functioning:  Average range  Insight:  Good  Judgement:  Good  Reliability:  Good    LABS:  Lab Results   Component Value Date    GLUCOSE 90 07/17/2024    BUN 11 07/17/2024    CREATININE 0.98 07/17/2024    EGFRRESULT 104.4 07/17/2024    EGFR 91.9 04/11/2024    BCR 11.2 07/17/2024    K 4.0 07/17/2024    CO2 27.4 07/17/2024    CALCIUM 9.4 07/17/2024    PROTENTOTREF 6.9 07/17/2024    ALBUMIN 4.3 07/17/2024    BILITOT 0.8 " 07/17/2024    AST 21 07/17/2024    ALT 20 07/17/2024     No Known Allergies    Current Outpatient Medications   Medication Instructions    busPIRone (BUSPAR) 5 mg, Oral, 3 Times Daily    citalopram (CELEXA) 20 mg, Oral, Every Morning    colestipol (COLESTID) 1 g tablet Start with 1 tab twice daily and may increase by 1 tab per week based on bowel habits; max dose 6 tabs/day    lisdexamfetamine (VYVANSE) 40 mg, Oral, Every Morning    metoprolol succinate XL (TOPROL-XL) 100 mg, Oral, Daily    naproxen (EC-Naproxen) 500 MG EC tablet TAKE ONE TABLET BY MOUTH TWO TIMES A DAY AS NEEDED FOR MILD PAIN    pantoprazole (PROTONIX) 40 mg, Oral, Daily    traZODone (DESYREL) 50 mg, Oral, Nightly, Do not take on nights you are working.     Assessment    ASSESSMENT/DIAGNOSIS/TREATMENT PLAN    (F90.2) Attention deficit hyperactivity disorder (ADHD), combined type - Plan: lisdexamfetamine (Vyvanse) 40 MG capsule    (Z79.899) High risk medication use - Plan: lisdexamfetamine (Vyvanse) 40 MG capsule    -Check BP at least once a week    FOLLOW UP: Return in about 3 months (around 11/13/2024) for Recheck.    ADDITIONAL MONITORING FOR CONTROLLED SUBSTANCE:  -Narc Contract: 6/24  -PDMP via EMR interface reviewed 08/13/2024  -UDS: random     -Without the prescribed controlled substance, it is reported that the patient has problems with the treated mental health condition. Due to the reported problems without the medication usage, as well as the significant improvement in symptoms with the medication usage, the patient requests to remain on the prescribed medication.    -Stimulant/ADHD General Instructions:  Patient educated that they must call every month when they have 3 days left of medication to request a refill [phone].  Patient educated that prescribing will not continue unless follow-up appointments are maintained.  Prescriptions can only be sent to a [place] pharmacy, 30-day supply no refills.  Monitor for appetite suppression/weight  loss, worsening anxiety, worsening sleep.  Remember to stay hydrated and snack throughout the day to avoid afternoon crash.  Any cardiovascular symptoms including chest pain, shortness of breath, dizziness, lightheadedness, stop medication and go to nearest emergency room or call 911.  Patient has been educated on the risk versus benefit of being prescribed a controlled substance, patient has been educated on additional monitoring that is required including William reports, random urine drug screens, in person appt's and pill counts. Controlled substance agreement renewed yearly. Patients are seen regularly while on a controlled substance.  Patient has been educated on nonstimulant options that carry with a much lower risk, and require less monitoring.    [id number] - [place], [place] - [address] [address] [phone]  - [phone]   [address] [address] [address]  Phone: [phone] Fax: [phone]    Medications Discontinued During This Encounter   Medication Reason    lisdexamfetamine (Vyvanse) 40 MG capsule Reorder       New Medications Ordered This Visit   Medications    lisdexamfetamine (Vyvanse) 40 MG capsule     Sig: Take 1 capsule by mouth Every Morning     Dispense:  30 capsule     Refill:  0          No orders of the defined types were placed in this encounter.    -Barriers: multiple psychiatric comorbidities  and stress  -Strengths:  humor and motivated     -Short-Term Goals: Pt will be compliant with medication management and note improvement in S/S over the next 4 to 6 weeks or at next scheduled visit.  -Long-Term Goals: Pt will be compliant with the agreed treatment plan including medication regimen & F/U appt's and deny impairment in daily functioning over the next 6 months.      -Progress toward goal: at goal  -Functional Status: no impairment   -Prognosis: good with Ongoing Treatment        SUMMARY/EDUCATION/DISCUSSION:  -Pt was given appropriate time to ask questions and  concerns were addressed. A thorough discussion was had that included review of disease process, need for continued monitoring and additional treatment options including use of pharmacological and non-pharmacological approaches to care, decisions were made and agreed upon by patient and provider.   -Discussed the risks, benefits, and potential side effects of the medications; patient ackowledged and verbally consented.   -Please call the office at [phone] with any worsening of symptoms or onset of intolerable side effects, please ask to leave a message with [facility]'s medical assistant.  Please give my office up to 48 hours to respond to a patient call/question/refill request.  -Patient is agreeable to call 911 or go to the nearest ER should he/she/they have any thoughts of harm to self or others.  -Patient has been educated regarding multimodal approach with healthy nutrition, healthy sleep, regular physical activity, social activities, counseling, and medications.     Part of this note may be an electronic transcription/translation of spoken language to printed text using the Dragon Dictation System. Some of the data in this electronic note has been brought forward from a previous encounter, any necessary changes have been made, it has been reviewed by this APRN, and it is accurate.    A total of 30 minutes was spent caring for patient today, that time included reviewing past note, updating patient information in the chart, assessing and educating patient on condition being treated, placing orders, contacting pt pharmacy and documenting in the record.    This document has been electronically signed by MAURILIO Lowe August 13, 2024 14:54 EDT

## 2024-08-13 ENCOUNTER — TELEPHONE (OUTPATIENT)
Dept: BEHAVIORAL HEALTH | Facility: CLINIC | Age: 34
End: 2024-08-13

## 2024-08-13 ENCOUNTER — OFFICE VISIT (OUTPATIENT)
Dept: BEHAVIORAL HEALTH | Facility: CLINIC | Age: 34
End: 2024-08-13
Payer: COMMERCIAL

## 2024-08-13 VITALS
OXYGEN SATURATION: 97 % | WEIGHT: 282 LBS | HEIGHT: 75 IN | HEART RATE: 93 BPM | SYSTOLIC BLOOD PRESSURE: 152 MMHG | DIASTOLIC BLOOD PRESSURE: 98 MMHG | BODY MASS INDEX: 35.06 KG/M2

## 2024-08-13 DIAGNOSIS — F90.2 ATTENTION DEFICIT HYPERACTIVITY DISORDER (ADHD), COMBINED TYPE: Primary | ICD-10-CM

## 2024-08-13 DIAGNOSIS — Z79.899 HIGH RISK MEDICATION USE: ICD-10-CM

## 2024-08-13 DIAGNOSIS — F51.01 PRIMARY INSOMNIA: ICD-10-CM

## 2024-08-13 PROCEDURE — 99214 OFFICE O/P EST MOD 30 MIN: CPT

## 2024-08-13 RX ORDER — TRAZODONE HYDROCHLORIDE 50 MG/1
TABLET ORAL
Qty: 30 TABLET | Refills: 5 | Status: SHIPPED | OUTPATIENT
Start: 2024-08-13

## 2024-08-13 RX ORDER — LISDEXAMFETAMINE DIMESYLATE 40 MG/1
40 CAPSULE ORAL EVERY MORNING
Qty: 30 CAPSULE | Refills: 0 | Status: SHIPPED | OUTPATIENT
Start: 2024-08-13

## 2024-08-13 NOTE — TELEPHONE ENCOUNTER
Called pharmacy per Ander Monzon's request to verify if patient picked up generic vyvanse prescription. Spoke with pharmacy tech, who verified patient picked up medication on 07/13/24. Reported that this was not on BEL[facility]N report, per Andre Monzon. She transferred me to the pharmacist. Spoke with the pharmacist and requested information be updated on BELÉN. He reported this takes 24 hours to report. Will check again tomorrow to see if BELÉN has been updated and follow up with pharmacy if not reported on BELÉN.

## 2024-08-13 NOTE — PATIENT INSTRUCTIONS
-Stimulant/ADHD General Instructions:  Patient educated that they must call every month when they have 3 days left of medication to request a refill [phone].  Patient educated that prescribing will not continue unless follow-up appointments are maintained.  Prescriptions can only be sent to a [place] pharmacy, 30-day supply no refills.  Monitor for appetite suppression/weight loss, worsening anxiety, worsening sleep.  Remember to stay hydrated and snack throughout the day to avoid afternoon crash.  Any cardiovascular symptoms including chest pain, shortness of breath, dizziness, lightheadedness, stop medication and go to nearest emergency room or call 911.  Patient has been educated on the risk versus benefit of being prescribed a controlled substance, patient has been educated on additional monitoring that is required including William reports, random urine drug screens, in person appt's and pill counts. Controlled substance agreement renewed yearly. Patients are seen regularly while on a controlled substance.  Patient has been educated on nonstimulant options that carry with a much lower risk, and require less monitoring.     GENERAL NEW PATIENT INSTRUCTIONS:    -Please arrive in person or virtually 10-15 minutes prior to appointment to allow for registration/sign in/questionnaires. If you are seen virtually please review after visit summary (AVS)/patient instructions via Results Scorecard for a summary of plan of care/changes in treatment plan. If you are having difficulties logging on or accessing Results Scorecard please contact my office for assistance.    -The best way to get a hold of Douglas is to call the office at [phone] and ask to leave a message with his medical assistant. Douglas Monzon is a Psychiatric Mental Health Nurse Practitioner, due to his specialty patient's are not able to message him directly via Results Scorecard. Please give my office up to 48 hours to respond to a patient call/question/refill request. Refill  requests will be made during normal office hours only, Monday-Friday 8:00-5:30.      -Douglas is out of the office on Fridays and weekends. Tuesdays and Thursdays are Douglas's in-office days, Mondays and Wednesdays are his telehealth days.  The decision to be seen virtually or in person is up to the discretion of the provider, not all behavioral health problems are appropriate for telehealth.    -Please call the office at [phone] with any worsening of symptoms or onset of intolerable side effects.  -Follow-up appointments must be maintained in order for prescribing to continue.  -Patient has been educated regarding multimodal approach with healthy nutrition, healthy sleep, regular physical activity, social activities, counseling, and medications.   -Please call 911 or go to the nearest ER if you begin to have thoughts of harming yourself or other people.    No show policy:  We understand unexpected circumstances arise; however, anytime you miss your appointment we are unable to provide you appropriate care.  In addition, each appointment missed could have been used to provide care for others.  We ask that you call at least 24 hours in advance to cancel or reschedule an appointment. We would like to take this opportunity to remind you of our policy stating patients who miss THREE or more appointments without cancelling or rescheduling 24 hours in advance of the appointment may be subject to cancellation of any further visits with our clinic and recommendation to seek in-person services/visits. Please call [phone] to reschedule your appointment. If there are reasons that make it difficult for you to keep the appointments, please call and let us know how we can help. Please understand that medication prescribing will not continue without seeing your provider.

## 2024-08-29 DIAGNOSIS — I10 ESSENTIAL HYPERTENSION: ICD-10-CM

## 2024-08-29 RX ORDER — METOPROLOL SUCCINATE 100 MG/1
100 TABLET, EXTENDED RELEASE ORAL DAILY
Qty: 90 TABLET | Refills: 0 | Status: SHIPPED | OUTPATIENT
Start: 2024-08-29

## 2024-09-19 DIAGNOSIS — F90.2 ATTENTION DEFICIT HYPERACTIVITY DISORDER (ADHD), COMBINED TYPE: ICD-10-CM

## 2024-09-19 DIAGNOSIS — Z79.899 HIGH RISK MEDICATION USE: ICD-10-CM

## 2024-09-19 RX ORDER — LISDEXAMFETAMINE DIMESYLATE 40 MG/1
40 CAPSULE ORAL EVERY MORNING
Qty: 30 CAPSULE | Refills: 0 | Status: SHIPPED | OUTPATIENT
Start: 2024-09-19

## 2024-10-22 DIAGNOSIS — Z79.899 HIGH RISK MEDICATION USE: ICD-10-CM

## 2024-10-22 DIAGNOSIS — F90.2 ATTENTION DEFICIT HYPERACTIVITY DISORDER (ADHD), COMBINED TYPE: ICD-10-CM

## 2024-10-22 RX ORDER — LISDEXAMFETAMINE DIMESYLATE 40 MG/1
40 CAPSULE ORAL EVERY MORNING
Qty: 30 CAPSULE | Refills: 0 | Status: SHIPPED | OUTPATIENT
Start: 2024-10-22

## 2024-11-27 DIAGNOSIS — F90.2 ATTENTION DEFICIT HYPERACTIVITY DISORDER (ADHD), COMBINED TYPE: ICD-10-CM

## 2024-11-27 DIAGNOSIS — Z79.899 HIGH RISK MEDICATION USE: ICD-10-CM

## 2024-11-27 RX ORDER — LISDEXAMFETAMINE DIMESYLATE 40 MG/1
40 CAPSULE ORAL EVERY MORNING
Qty: 30 CAPSULE | Refills: 0 | Status: SHIPPED | OUTPATIENT
Start: 2024-11-27

## 2024-11-27 NOTE — TELEPHONE ENCOUNTER
Pt called requesting a refill on Vyvanse 40mg sent to [facility] [address]. Pt was unaware that Douglas was no longer here.

## 2024-11-27 NOTE — TELEPHONE ENCOUNTER
LAST REFILL - 10/22/24  LAST VISIT - 08/13/24  NEXT VISIT - new psych appt 02/03/25 (patient found out today that Andre is no longer working with [facility], so new appt is out further than expected)    Routing to covering provider Dr. Ng due to Andre Monzon offboarding

## 2024-12-05 ENCOUNTER — TELEPHONE (OUTPATIENT)
Dept: FAMILY MEDICINE CLINIC | Facility: CLINIC | Age: 34
End: 2024-12-05
Payer: COMMERCIAL

## 2024-12-05 DIAGNOSIS — F32.2 CURRENT SEVERE EPISODE OF MAJOR DEPRESSIVE DISORDER WITHOUT PSYCHOTIC FEATURES, UNSPECIFIED WHETHER RECURRENT: ICD-10-CM

## 2024-12-05 RX ORDER — CITALOPRAM HYDROBROMIDE 20 MG/1
20 TABLET ORAL EVERY MORNING
Qty: 90 TABLET | Refills: 1 | Status: SHIPPED | OUTPATIENT
Start: 2024-12-05 | End: 2024-12-09

## 2024-12-06 ENCOUNTER — PRIOR AUTHORIZATION (OUTPATIENT)
Dept: BEHAVIORAL HEALTH | Facility: CLINIC | Age: 34
End: 2024-12-06
Payer: COMMERCIAL

## 2024-12-06 NOTE — TELEPHONE ENCOUNTER
Contacted patient regarding medication. Informed patient that medication was sent to the pharmacy on 11/27/24. Patient stated that the pharmacy is requesting an authorization of some kind before filling the medication.    Contacted the pharmacy to inquire about authorization. Spoke with pharmacy [facility] who stated there is no need for any kind of prior authorization as the insurance is approving the medication. She stated the pharmacy does not have the stock to fill the prescription.    Patient informed via detailed VM per [place] on behavioral health verbal release form to leave detailed voice messages. Recommended to look for another pharmacy that has the medication in stock if he wishes to fill it sooner and call to request a switch in pharmacies.

## 2024-12-06 NOTE — TELEPHONE ENCOUNTER
Attempted to create prior authorization for patient's lisdexamfetamine via EPA. Unable to do so per Dr. Ng, covering provider for Andre Monzon, having sent the medication to the pharmacy. Unable to access EPA through this provider.    Attempted to create PA via CoverMyMeds. Unable to verify patient's insurance. Patient informed in separate telephone encounter from 12/5/24

## 2024-12-07 DIAGNOSIS — I10 ESSENTIAL HYPERTENSION: ICD-10-CM

## 2024-12-07 DIAGNOSIS — F32.2 CURRENT SEVERE EPISODE OF MAJOR DEPRESSIVE DISORDER WITHOUT PSYCHOTIC FEATURES, UNSPECIFIED WHETHER RECURRENT: ICD-10-CM

## 2024-12-09 RX ORDER — CITALOPRAM HYDROBROMIDE 20 MG/1
20 TABLET ORAL EVERY MORNING
Qty: 90 TABLET | Refills: 1 | Status: SHIPPED | OUTPATIENT
Start: 2024-12-09

## 2024-12-09 RX ORDER — METOPROLOL SUCCINATE 100 MG/1
100 TABLET, EXTENDED RELEASE ORAL DAILY
Qty: 90 TABLET | Refills: 0 | Status: SHIPPED | OUTPATIENT
Start: 2024-12-09

## 2024-12-19 DIAGNOSIS — F41.9 ANXIETY: ICD-10-CM

## 2024-12-20 RX ORDER — BUSPIRONE HYDROCHLORIDE 5 MG/1
5 TABLET ORAL 3 TIMES DAILY
Qty: 90 TABLET | Refills: 5 | Status: SHIPPED | OUTPATIENT
Start: 2024-12-20

## 2025-01-16 DIAGNOSIS — Z79.899 HIGH RISK MEDICATION USE: ICD-10-CM

## 2025-01-16 DIAGNOSIS — F90.2 ATTENTION DEFICIT HYPERACTIVITY DISORDER (ADHD), COMBINED TYPE: ICD-10-CM

## 2025-01-16 RX ORDER — LISDEXAMFETAMINE DIMESYLATE 40 MG/1
40 CAPSULE ORAL EVERY MORNING
Qty: 30 CAPSULE | Refills: 0 | Status: SHIPPED | OUTPATIENT
Start: 2025-01-16

## 2025-01-16 NOTE — TELEPHONE ENCOUNTER
lisdexamfetamine (Vyvanse) 40 MG capsule     [facility] [place] [id number]    Patient requesting refill

## 2025-01-16 NOTE — TELEPHONE ENCOUNTER
LAST REFILL - 11/27/24  LAST VISIT - 08/13/24  NEXT VISIT - new psych appointment with Dr. Mercer 2/3/25    This is a patient of Andre Monzon's. Routing to covering provider Dr. Ng due to Andre Monzon offboarding

## 2025-01-22 ENCOUNTER — OFFICE VISIT (OUTPATIENT)
Dept: FAMILY MEDICINE CLINIC | Facility: CLINIC | Age: 35
End: 2025-01-22
Payer: COMMERCIAL

## 2025-01-22 VITALS
DIASTOLIC BLOOD PRESSURE: 82 MMHG | OXYGEN SATURATION: 98 % | SYSTOLIC BLOOD PRESSURE: 120 MMHG | BODY MASS INDEX: 37.92 KG/M2 | WEIGHT: 305 LBS | HEIGHT: 75 IN | HEART RATE: 54 BPM

## 2025-01-22 DIAGNOSIS — F32.89 OTHER DEPRESSION: ICD-10-CM

## 2025-01-22 DIAGNOSIS — R53.83 OTHER FATIGUE: ICD-10-CM

## 2025-01-22 DIAGNOSIS — E55.9 VITAMIN D DEFICIENCY: ICD-10-CM

## 2025-01-22 DIAGNOSIS — G89.29 CHRONIC PAIN OF BOTH KNEES: ICD-10-CM

## 2025-01-22 DIAGNOSIS — Z00.00 ANNUAL PHYSICAL EXAM: Primary | ICD-10-CM

## 2025-01-22 DIAGNOSIS — M25.562 CHRONIC PAIN OF BOTH KNEES: ICD-10-CM

## 2025-01-22 DIAGNOSIS — E53.8 VITAMIN B12 DEFICIENCY: ICD-10-CM

## 2025-01-22 DIAGNOSIS — F41.9 ANXIETY: ICD-10-CM

## 2025-01-22 DIAGNOSIS — M25.561 CHRONIC PAIN OF BOTH KNEES: ICD-10-CM

## 2025-01-22 DIAGNOSIS — E78.2 MIXED HYPERLIPIDEMIA: ICD-10-CM

## 2025-01-22 PROCEDURE — 99395 PREV VISIT EST AGE 18-39: CPT | Performed by: NURSE PRACTITIONER

## 2025-01-22 RX ORDER — DICLOFENAC SODIUM 75 MG/1
75 TABLET, DELAYED RELEASE ORAL 2 TIMES DAILY
Qty: 60 TABLET | Refills: 0 | Status: SHIPPED | OUTPATIENT
Start: 2025-01-22

## 2025-01-22 NOTE — PROGRESS NOTES
Subjective   Roscoe Arias is a 34 y.o. male. Presents today for No chief complaint on file.      History Of Present Illness Roscoe is a 34-year-old male presenting today for annual physical exam    ADHD: Vyvanse    Anxiety: Buspirone    Depression: Celexa    GERD: Pantoprazole    Gout    Hypertension:    Hyperlipidemia: Colestipol    Insomnia:  Trazodone    Sleep apnea:     Care gaps:  Influenza declined  COVID declined    Annual physical  BMI follow-up    History of Present Illness  The patient presents for evaluation of bilateral knee pain, anxiety and depression, GERD, gout, and fatigue.    He reports experiencing fatigue and generalized body pain, which he attributes to the cold weather. He has been dealing with persistent knee issues, particularly in the left knee, which have remained unaddressed due to his reluctance to undergo knee replacement surgery at his current age of 35. The pain is described as severe, especially after prolonged periods of sitting or exposure to extremely cold temperatures. He also experiences discomfort when maintaining a single position for an extended period, necessitating frequent movement. The pain is so intense that it disrupts his sleep. He is currently on naproxen for pain management.    His anxiety and depression symptoms are well-managed with medication, but he experiences heightened irritability and annoyance when he misses a dose. He recalls an incident where he forgot his medication at work, leading to increased agitation at home.    His GERD symptoms have been well-controlled, but he has experienced a flare-up in the past 2 to 3 days. He had previously discontinued daily Pepcid use but has resumed taking it before bedtime for the past 4 to 5 days. Without this medication, he experiences acid reflux upon waking. He acknowledges that his diet has been poor in recent days, which may have contributed to the exacerbation of his symptoms.    He reports intermittent gout  flare-ups, which he manages with intravenous fluids. These episodes have not been severe enough to impede his mobility or work performance.    He has been experiencing a lack of energy and motivation for the past 1.5 months, even during periods of rest from work. This lethargy extends to household chores, childcare, and sexual activity. He is not experiencing excessive sleepiness. He was previously on B12 supplements following weight loss surgery but has since discontinued them. He is attempting to resume gym workouts but is struggling due to his low energy levels.    Supplemental Information  He has a doctor's appointment in the next 2 weeks with the new doctor over [place] [address] and will see how it goes because he can do teleconference visits every month.    MEDICATIONS  Current: Vyvanse, Pepcid, naproxen    IMMUNIZATIONS  He has not received the COVID-19 vaccine and declined the influenza vaccine.    Patient Active Problem List   Diagnosis    Chronic foot pain, left    Achilles tendon contracture due to non-neurologic cause    Essential hypertension    Class 3 severe obesity due to excess calories with serious comorbidity and body mass index (BMI) of 45.0 to 49.9 in adult    Sleep apnea    Gout    Acute calculous cholecystitis    Choledocholithiasis with acute cholecystitis with obstruction    Cholecystitis    Disease due to severe acute respiratory syndrome coronavirus 2 (SARS-CoV-2)    Current severe episode of major depressive disorder without psychotic features    Attention deficit hyperactivity disorder (ADHD), combined type    Acute cough    High risk medication use       Social History     Socioeconomic History    Marital status: Significant Other   Tobacco Use    Smoking status: Former     Current packs/day: 0.00     Average packs/day: 0.5 packs/day for 2.0 years (1.0 ttl pk-yrs)     Types: Cigarettes     Start date: 2009     Quit date: 2011     Years since quittin.6    Smokeless tobacco:  Former     Types: Chew   Vaping Use    Vaping status: Never Used   Substance and Sexual Activity    Alcohol use: Yes     Comment: 2-3X WEEKLY    Drug use: Never    Sexual activity: Defer       No Known Allergies    Current Outpatient Medications on File Prior to Visit   Medication Sig Dispense Refill    busPIRone (BUSPAR) 5 MG tablet TAKE ONE TABLET BY MOUTH THREE TIMES A DAY 90 tablet 5    citalopram (CeleXA) 20 MG tablet TAKE 1 TABLET BY MOUTH EVERY MORNING 90 tablet 1    colestipol (COLESTID) 1 g tablet Start with 1 tab twice daily and may increase by 1 tab per week based on bowel habits; max dose 6 tabs/day 120 tablet 5    lisdexamfetamine (Vyvanse) 40 MG capsule Take 1 capsule by mouth Every Morning 30 capsule 0    metoprolol succinate XL (TOPROL-XL) 100 MG 24 hr tablet TAKE 1 TABLET BY MOUTH DAILY 90 tablet 0    naproxen (EC-Naproxen) 500 MG EC tablet TAKE ONE TABLET BY MOUTH TWO TIMES A DAY AS NEEDED FOR MILD PAIN 60 tablet 5    pantoprazole (PROTONIX) 40 MG EC tablet Take 1 tablet by mouth Daily. 90 tablet 3    traZODone (DESYREL) 50 MG tablet TAKE ONE TABLET BY MOUTH ONCE NIGHTLY. DO NOT TAKE NIGHTS YOU ARE WORKING 30 tablet 5     No current facility-administered medications on file prior to visit.       Objective   There were no vitals filed for this visit.  There is no height or weight on file to calculate BMI.    Physical Exam    Physical Exam  Constitutional:       Appearance: Normal appearance.   HENT:      Head: Normocephalic and atraumatic.      Nose: Nose normal.      Mouth/Throat:      Mouth: Mucous membranes are moist.   Eyes:      Pupils: Pupils are equal, round, and reactive to light.   Cardiovascular:      Rate and Rhythm: Normal rate and regular rhythm.      Pulses: Normal pulses.      Heart sounds: Normal heart sounds.   Pulmonary:      Effort: Pulmonary effort is normal.      Breath sounds: Normal breath sounds.   Abdominal:      General: Bowel sounds are normal.   Musculoskeletal:          General: Normal range of motion.      Cervical back: Normal range of motion.   Skin:     General: Skin is warm and dry.      Capillary Refill: Capillary refill takes less than 2 seconds.   Neurological:      General: No focal deficit present.      Mental Status: He is alert.   Psychiatric:         Mood and Affect: Mood normal.     Results         Procedures     Assessment & Plan   There are no diagnoses linked to this encounter.     Assessment & Plan  1. Bilateral knee pain.  He reports significant pain in both knees, exacerbated by cold weather and long days at work. The pain is severe enough to cause difficulty in keeping the knee in one spot and requires frequent movement. Diclofenac 50 mg will be prescribed to be taken twice daily with meals to help manage the inflammation and pain. He is currently taking naproxen but will switch to diclofenac as it is stronger.    2. Anxiety and depression.  He reports that his symptoms worsen significantly when he does not take his medications. He is advised to continue his current medication regimen without interruption to manage his symptoms effectively.    3. Gastroesophageal reflux disease (GERD).  He reports a recent flare-up of GERD symptoms over the past few days, likely due to dietary indiscretions. He has resumed taking Pepcid before bed to manage nighttime symptoms. He is advised to continue taking Pepcid as needed and to avoid foods that trigger his symptoms.    4. Gout.  He experiences occasional flare-ups of gout but manages them with increased fluid intake. No changes to his current management plan are necessary at this time.    5. Fatigue.  He reports persistent fatigue and lack of motivation over the past month and a half. A testosterone level test will be conducted to evaluate for potential deficiency. Additionally, vitamin D and B12 levels will be checked to rule out deficiencies that might contribute to his symptoms.    Follow-up  The patient will follow up  in 6 months.       Diagnoses and all orders for this visit:    1. Annual physical exam (Primary)    2. Anxiety    3. Other depression    4. Mixed hyperlipidemia  -     CBC & Differential  -     Comprehensive Metabolic Panel  -     Lipid Panel    5. Vitamin B12 deficiency  -     Vitamin B12    6. Vitamin D deficiency  -     Vitamin D 25 hydroxy; Future    7. Other fatigue  -     Testosterone (Free & Total), LC / MS    8. Chronic pain of both knees  -     diclofenac (VOLTAREN) 75 MG EC tablet; Take 1 tablet by mouth 2 (Two) Times a Day.  Dispense: 60 tablet; Refill: 0    1.-4.  Continue current plan of care      Class 2 Severe Obesity (BMI >=35 and <=39.9). Obesity-related health conditions include the following: dyslipidemias and GERD. Obesity is improving with lifestyle modifications. BMI is  a work in progress . We discussed low calorie, low carb based diet program, portion control, increasing exercise, and joining a fitness center or start home based exercise program.     No follow-ups on file.     Patient or patient representative verbalized consent for the use of Ambient Listening during the visit with  MAURILIO Kumar for chart documentation. 1/22/2025  08:56 EST

## 2025-01-29 DIAGNOSIS — M10.00 IDIOPATHIC GOUT, UNSPECIFIED CHRONICITY, UNSPECIFIED SITE: ICD-10-CM

## 2025-01-29 DIAGNOSIS — E29.1 HYPOGONADISM IN MALE: Primary | ICD-10-CM

## 2025-01-29 LAB
ALBUMIN SERPL-MCNC: 4.1 G/DL (ref 3.5–5.2)
ALBUMIN/GLOB SERPL: 1.5 G/DL
ALP SERPL-CCNC: 55 U/L (ref 39–117)
ALT SERPL-CCNC: 25 U/L (ref 1–41)
AST SERPL-CCNC: 23 U/L (ref 1–40)
BASOPHILS # BLD AUTO: 0.04 10*3/MM3 (ref 0–0.2)
BASOPHILS NFR BLD AUTO: 0.7 % (ref 0–1.5)
BILIRUB SERPL-MCNC: 0.9 MG/DL (ref 0–1.2)
BUN SERPL-MCNC: 9 MG/DL (ref 6–20)
BUN/CREAT SERPL: 9.8 (ref 7–25)
CALCIUM SERPL-MCNC: 9.6 MG/DL (ref 8.6–10.5)
CHLORIDE SERPL-SCNC: 104 MMOL/L (ref 98–107)
CHOLEST SERPL-MCNC: 170 MG/DL (ref 0–200)
CO2 SERPL-SCNC: 28.2 MMOL/L (ref 22–29)
CREAT SERPL-MCNC: 0.92 MG/DL (ref 0.76–1.27)
EGFRCR SERPLBLD CKD-EPI 2021: 111.9 ML/MIN/1.73
EOSINOPHIL # BLD AUTO: 0.19 10*3/MM3 (ref 0–0.4)
EOSINOPHIL NFR BLD AUTO: 3.4 % (ref 0.3–6.2)
ERYTHROCYTE [DISTWIDTH] IN BLOOD BY AUTOMATED COUNT: 13.6 % (ref 12.3–15.4)
GLOBULIN SER CALC-MCNC: 2.8 GM/DL
GLUCOSE SERPL-MCNC: 84 MG/DL (ref 65–99)
HCT VFR BLD AUTO: 42.1 % (ref 37.5–51)
HDLC SERPL-MCNC: 40 MG/DL (ref 40–60)
HGB BLD-MCNC: 13.5 G/DL (ref 13–17.7)
IMM GRANULOCYTES # BLD AUTO: 0 10*3/MM3 (ref 0–0.05)
IMM GRANULOCYTES NFR BLD AUTO: 0 % (ref 0–0.5)
LDLC SERPL CALC-MCNC: 115 MG/DL (ref 0–100)
LYMPHOCYTES # BLD AUTO: 1.76 10*3/MM3 (ref 0.7–3.1)
LYMPHOCYTES NFR BLD AUTO: 31.4 % (ref 19.6–45.3)
MCH RBC QN AUTO: 28.1 PG (ref 26.6–33)
MCHC RBC AUTO-ENTMCNC: 32.1 G/DL (ref 31.5–35.7)
MCV RBC AUTO: 87.7 FL (ref 79–97)
MONOCYTES # BLD AUTO: 0.6 10*3/MM3 (ref 0.1–0.9)
MONOCYTES NFR BLD AUTO: 10.7 % (ref 5–12)
NEUTROPHILS # BLD AUTO: 3.01 10*3/MM3 (ref 1.7–7)
NEUTROPHILS NFR BLD AUTO: 53.8 % (ref 42.7–76)
NRBC BLD AUTO-RTO: 0 /100 WBC (ref 0–0.2)
PLATELET # BLD AUTO: 189 10*3/MM3 (ref 140–450)
POTASSIUM SERPL-SCNC: 4.2 MMOL/L (ref 3.5–5.2)
PROT SERPL-MCNC: 6.9 G/DL (ref 6–8.5)
RBC # BLD AUTO: 4.8 10*6/MM3 (ref 4.14–5.8)
SODIUM SERPL-SCNC: 140 MMOL/L (ref 136–145)
TESTOST FREE SERPL-MCNC: 5.4 PG/ML (ref 8.7–25.1)
TESTOST SERPL-MCNC: 454.3 NG/DL (ref 264–916)
TRIGL SERPL-MCNC: 80 MG/DL (ref 0–150)
VIT B12 SERPL-MCNC: 301 PG/ML (ref 211–946)
VLDLC SERPL CALC-MCNC: 15 MG/DL (ref 5–40)
WBC # BLD AUTO: 5.6 10*3/MM3 (ref 3.4–10.8)

## 2025-01-29 RX ORDER — NAPROXEN 500 MG/1
500 TABLET ORAL 2 TIMES DAILY PRN
Qty: 60 TABLET | Refills: 5 | OUTPATIENT
Start: 2025-01-29

## 2025-01-29 RX ORDER — TESTOSTERONE CYPIONATE 200 MG/ML
100 INJECTION, SOLUTION INTRAMUSCULAR
Qty: 2 ML | Refills: 0 | Status: SHIPPED | OUTPATIENT
Start: 2025-01-29

## 2025-02-03 ENCOUNTER — OFFICE VISIT (OUTPATIENT)
Age: 35
End: 2025-02-03
Payer: COMMERCIAL

## 2025-02-03 VITALS
WEIGHT: 305 LBS | DIASTOLIC BLOOD PRESSURE: 92 MMHG | OXYGEN SATURATION: 98 % | SYSTOLIC BLOOD PRESSURE: 144 MMHG | HEART RATE: 55 BPM | BODY MASS INDEX: 37.92 KG/M2 | HEIGHT: 75 IN

## 2025-02-03 DIAGNOSIS — F90.2 ATTENTION DEFICIT HYPERACTIVITY DISORDER (ADHD), COMBINED TYPE: Primary | ICD-10-CM

## 2025-02-03 DIAGNOSIS — F33.2 SEVERE EPISODE OF RECURRENT MAJOR DEPRESSIVE DISORDER, WITHOUT PSYCHOTIC FEATURES: ICD-10-CM

## 2025-02-03 NOTE — PROGRESS NOTES
"Subjective   Roscoe Arias is a 34 y.o. male who presents today for initial evaluation     Chief Complaint: \"My ADHD.\"    History of Present Illness:   Mr. Roscoe Arias is a 34-year-old male seen as a new patient to establish care for ongoing psychiatric management as most recent psychiatric provider, MAURILIO Lowe is no longer employed with [facility].    Patient reports a near lifelong history of various depressive and anxious symptoms stating that he did not seek pharmacological treatment until approximately 1 to 2 years ago due to increased difficulties appropriately managing these symptoms.  He has since been prescribed a combination of buspirone, citalopram, and as needed trazodone by his primary care physician with a perceived improvement in psychiatric symptoms associated with this medication regimen.  Patient was more recently diagnosed with ADHD after undergoing neuropsychological evaluation via [facility] in December 2023, which did ultimately prompt the patient to establish care with previous psychiatric provider who initiated the patient on Vyvanse with a rather significant improvement in patient's ADHD symptoms.  Patient's neuropsychological evaluation was reviewed in detail prior to today's visit.  In regard to the patient's ADHD symptoms he reports a lifelong history of increased levels of distractibility, difficulties directing and sustaining his attention/focus, difficulties paying attention when directly spoken to, difficulties following through on instructions/directions, difficulties completing necessary tasks in an efficient and timely manner, difficulties with organization overall, frequent procrastination, difficulties interrupting others/blurting out answers, near constant feelings of restlessness associated fidgeting, and difficulties remaining seated/still when necessary.  Patient does report being evaluated as a child due to struggling academically, and well " the diagnosis of ADHD was briefly discussed patient states he was diagnosed with an unspecified learning disorder and that he has always had issues with reading and writing.  He denies requiring special education and states that he never had an IEP.  Patient states that he did always struggle academically throughout grade school, middle school, and high school but did ultimately graduate high school.  It is of note the patient states that he was frequently bullied/picked on as a child and as such would remain isolated and to himself much of the time especially while at school, which very likely did contribute to the patient's reported academic struggles.  Patient does also report a history of prolonged depressive episodes associated with a significantly depressed mood, decreased levels of energy/fatigue, anhedonia, difficulties initiating/maintaining sleep, low self worth, excessive feelings of guilt, increased appetite, difficulty sustaining his concentration, and noticeable psychomotor retardation lasting anywhere from several weeks to months at a time.  Patient denies ever experiencing any active suicidal ideation, intent or plan but does report a history of intermittent thoughts of death/suicide described as passive and fleeting in nature experienced as an adolescent/young adult.  Patient denies ever experiencing any actual intent to act on these thoughts in addition to ever developing a concrete plan to harm or kill himself.    Patient otherwise denies ever experiencing any hypomanic/manic symptoms such as a persistently elevated mood lasting 4 days or more at a time associated with a decreased need for sleep, increased levels of energy, increased goal-directed behaviors, racing thoughts, pressured speech, increased grandiosity or uncharacteristic behaviors such as excessive spending on unnecessary items or increased sex drive/promiscuity.  Patient also denies ever experiencing any auditory, visual, or tactile  hallucinations and does not currently appear to be responding to internal stimuli.  He denies any history of generalized paranoia and displays no evidence of delusional thinking.    Past Psychiatric History:  As detailed above patient first began to seek psychiatric care approximately 1 year ago after being diagnosed with ADHD via [facility].  Patient is currently prescribed Vyvanse 40 mg p.o. once daily with adequate management of symptoms reported by the patient.  Prior to this diagnosis patient was prescribed a combination of citalopram, buspirone, and as needed trazodone by his primary care physician for management of depressive and anxious symptoms.  Patient does report adequate management of his depressive/anxious symptoms associated with his current medication regimen until rather recently, stating that he has experienced a progressive worsening of depressive and anxious symptoms over the past several weeks to months.  It is of note the patient was recently seen by primary care physician who determined that the patient's testosterone levels are low and that he would benefit from hormone supplementation at this time.  Patient denies any additional trials of other psychiatric medications in the past.  Patient also denies any history of suicide attempts or self-injurious behavior.  He denies any history of inpatient psychiatric hospitalizations.    Family Psychiatric History:  Patient denies any diagnosed psychiatric illness in the family but does report a similar clinical picture observed in his biological father in regard to potential ADHD and depression.    Medical History:  Reviewed via EMR.  Patient denies any history of seizure disorders or traumatic brain injuries.    Substance Abuse History:  Patient does report the use of alcohol in social settings stating that he will typically drink 2-3 times per week.  Patient denies any history of daily and excessive use of alcohol in addition to any  history of inpatient or outpatient substance dependence rehabilitation/detox.  Patient otherwise denies use of cannabis or any other illegal/illicit substance.    Social History:  Patient was born and raised in [place], [place] and reports a relatively good childhood growing up.  Patient does have 1 younger brother and states that they always got along growing up but that they are not very close currently.  Patient's academic history detailed above.  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  Patient does currently work full-time as a  for [employer] in addition to working part-time for other fire departments around the [facility].  Patient is in a committed relationship and is scheduled to be  in March of this year.  Patient has 2 daughters aged 3 and 1.  Patient is currently living with his significant other and 2 children.  He denies any history of significant legal issues or  experience.    The following portions of the patient's history were reviewed and updated as appropriate: allergies, current medications, past family history, past medical history, past social history, past surgical history and problem list.       Past Medical History:  Past Medical History:   Diagnosis Date    Current severe episode of major depressive disorder without psychotic features 2024    Hypertension     PTSD (post-traumatic stress disorder)     Sleep apnea 2021       Social History:  Social History     Socioeconomic History    Marital status: Significant Other   Tobacco Use    Smoking status: Former     Current packs/day: 0.00     Average packs/day: 0.5 packs/day for 2.0 years (1.0 ttl pk-yrs)     Types: Cigarettes     Start date: 2009     Quit date: 2011     Years since quittin.6     Passive exposure: Past    Smokeless tobacco: Former     Types: Chew   Vaping  Use    Vaping status: Never Used   Substance and Sexual Activity    Alcohol use: Yes     Comment: 2-3X WEEKLY    Drug use: Never    Sexual activity: Defer       Family History:  Family History   Problem Relation Age of Onset    Obesity Mother     Hypertension Mother     Sleep apnea Mother     Obesity Sister     Hypertension Sister     Diabetes Maternal Grandmother     Hypertension Maternal Grandmother     Obesity Maternal Grandfather     Hypertension Maternal Grandfather     Diabetes Paternal Grandmother     Hypertension Paternal Grandmother     Cancer Paternal Grandfather     Malig Hyperthermia Neg Hx     Colon cancer Neg Hx     Colon polyps Neg Hx     Crohn's disease Neg Hx     Irritable bowel syndrome Neg Hx     Ulcerative colitis Neg Hx        Past Surgical History:  Past Surgical History:   Procedure Laterality Date    CHOLECYSTECTOMY WITH INTRAOPERATIVE CHOLANGIOGRAM N/A 04/03/2022    Procedure: Laparoscopic cholecystectomy with cholangiogram;  Surgeon: Maddi Peter MD;  Location: [facility] MAIN OR;  Service: General;  Laterality: N/A;    ERCP N/A 04/04/2022    Procedure: ENDOSCOPIC RETROGRADE CHOLANGIOPANCREATOGRAPHY WITH SPHINCTEROTOMY AND BALLOON SWEEP;  Surgeon: Colin Godwin MD;  Location: [facility] ENDOSCOPY;  Service: Gastroenterology;  Laterality: N/A;  PRE- COMMON BILE DUCT STONES  POST- SAME    GASTRIC SLEEVE LAPAROSCOPIC  11/08/2021    in [place]       Problem List:  Patient Active Problem List   Diagnosis    Chronic foot pain, left    Achilles tendon contracture due to non-neurologic cause    Essential hypertension    Class 3 severe obesity due to excess calories with serious comorbidity and body mass index (BMI) of 45.0 to 49.9 in adult    Sleep apnea    Gout    Acute calculous cholecystitis    Choledocholithiasis with acute cholecystitis with obstruction    Cholecystitis    Disease due to severe acute respiratory syndrome coronavirus 2 (SARS-CoV-2)    Current severe episode of major  depressive disorder without psychotic features    Attention deficit hyperactivity disorder (ADHD), combined type    Acute cough    High risk medication use       Allergy:   No Known Allergies     Current Medications:   Current Outpatient Medications   Medication Sig Dispense Refill    busPIRone (BUSPAR) 5 MG tablet TAKE ONE TABLET BY MOUTH THREE TIMES A DAY 90 tablet 5    citalopram (CeleXA) 20 MG tablet TAKE 1 TABLET BY MOUTH EVERY MORNING 90 tablet 1    colestipol (COLESTID) 1 g tablet Start with 1 tab twice daily and may increase by 1 tab per week based on bowel habits; max dose 6 tabs/day 120 tablet 5    diclofenac (VOLTAREN) 75 MG EC tablet Take 1 tablet by mouth 2 (Two) Times a Day. 60 tablet 0    lisdexamfetamine (Vyvanse) 40 MG capsule Take 1 capsule by mouth Every Morning 30 capsule 0    metoprolol succinate XL (TOPROL-XL) 100 MG 24 hr tablet TAKE 1 TABLET BY MOUTH DAILY 90 tablet 0    pantoprazole (PROTONIX) 40 MG EC tablet Take 1 tablet by mouth Daily. 90 tablet 3    Testosterone Cypionate (DEPOTESTOTERONE CYPIONATE) 200 MG/ML injection Inject 0.5 mL into the appropriate muscle as directed by prescriber Every 14 (Fourteen) Days. 2 mL 0    traZODone (DESYREL) 50 MG tablet TAKE ONE TABLET BY MOUTH ONCE NIGHTLY. DO NOT TAKE NIGHTS YOU ARE WORKING 30 tablet 5     No current facility-administered medications for this visit.       Review of Symptoms:    Review of Systems   Constitutional:  Positive for activity change and fatigue.   HENT:  Negative for tinnitus.    Eyes:  Negative for visual disturbance.   Endocrine: Negative for cold intolerance and heat intolerance.   Skin:  Negative for rash.   Neurological:  Negative for seizures and confusion.   Psychiatric/Behavioral:  Positive for decreased concentration, sleep disturbance and depressed mood. Negative for hallucinations, self-injury and suicidal ideas. The patient is nervous/anxious.          Physical Exam:   Blood pressure 144/92, pulse 55, height 190.5  "cm (75\"), weight (!) 138 kg (305 lb), SpO2 98%.  Appearance: Appears documented age, appropriate hygiene and grooming.  Gait, Station, Strength: Normal gait, station and strength.       Mental Status Exam:   Hygiene:   good  Cooperation:  Cooperative  Eye Contact:  Good  Psychomotor Behavior:  Appropriate  Affect:  Full range and Appropriate  Mood: normal and euthymic  Hopelessness: Denies  Speech:  Normal  Thought Process:  Goal directed and Linear  Thought Content:  Normal  Suicidal:  None  Homicidal:  None  Hallucinations:  None  Delusion:  None  Memory:  Intact  Orientation:  Person, Place, Time, and Situation  Reliability:  good  Insight:  Good  Judgement:  Good  Impulse Control:  Good      Lab Results:   Office Visit on 01/22/2025   Component Date Value Ref Range Status    WBC 01/22/2025 5.60  3.40 - 10.80 10*3/mm3 Final    RBC 01/22/2025 4.80  4.14 - 5.80 10*6/mm3 Final    Hemoglobin 01/22/2025 13.5  13.0 - 17.7 g/dL Final    Hematocrit 01/22/2025 42.1  37.5 - 51.0 % Final    MCV 01/22/2025 87.7  79.0 - 97.0 fL Final    MCH 01/22/2025 28.1  26.6 - 33.0 pg Final    MCHC 01/22/2025 32.1  31.5 - 35.7 g/dL Final    RDW 01/22/2025 13.6  12.3 - 15.4 % Final    Platelets 01/22/2025 189  140 - 450 10*3/mm3 Final    Neutrophil Rel % 01/22/2025 53.8  42.7 - 76.0 % Final    Lymphocyte Rel % 01/22/2025 31.4  19.6 - 45.3 % Final    Monocyte Rel % 01/22/2025 10.7  5.0 - 12.0 % Final    Eosinophil Rel % 01/22/2025 3.4  0.3 - 6.2 % Final    Basophil Rel % 01/22/2025 0.7  0.0 - 1.5 % Final    Neutrophils Absolute 01/22/2025 3.01  1.70 - 7.00 10*3/mm3 Final    Lymphocytes Absolute 01/22/2025 1.76  0.70 - 3.10 10*3/mm3 Final    Monocytes Absolute 01/22/2025 0.60  0.10 - 0.90 10*3/mm3 Final    Eosinophils Absolute 01/22/2025 0.19  0.00 - 0.40 10*3/mm3 Final    Basophils Absolute 01/22/2025 0.04  0.00 - 0.20 10*3/mm3 Final    Immature Granulocyte Rel % 01/22/2025 0.0  0.0 - 0.5 % Final    Immature Grans Absolute 01/22/2025 0.00  " 0.00 - 0.05 10*3/mm3 Final    nRBC 01/22/2025 0.0  0.0 - 0.2 /100 WBC Final    Glucose 01/22/2025 84  65 - 99 mg/dL Final    BUN 01/22/2025 9  6 - 20 mg/dL Final    Creatinine 01/22/2025 0.92  0.76 - 1.27 mg/dL Final    EGFR Result 01/22/2025 111.9  >60.0 mL/min/1.73 Final    Comment: GFR Categories in Chronic Kidney Disease (CKD)/X09/  /X09/  GFR Category          GFR (mL/min/1.73)    Interpretation  G1/X09/                    90 or greater/X09/        Normal or high  (1)  G2//                    60-89                Mild decrease  (1)  G3a                   45-59                Mild to moderate  decrease  G3b                   30-44                Moderate to  severe decrease  G4                    15-29                Severe decrease  G5                    14 or less           Kidney failure//  /[id number]/  (1)In the absence of evidence of kidney disease, neither  GFR category G1 or G2 fulfill the criteria for CKD.  eGFR calculation 2021 CKD-EPI creatinine equation, which  does not include race as a factor      BUN/Creatinine Ratio 01/22/2025 9.8  7.0 - 25.0 Final    Sodium 01/22/2025 140  136 - 145 mmol/L Final    Potassium 01/22/2025 4.2  3.5 - 5.2 mmol/L Final    Chloride 01/22/2025 104  98 - 107 mmol/L Final    Total CO2 01/22/2025 28.2  22.0 - 29.0 mmol/L Final    Calcium 01/22/2025 9.6  8.6 - 10.5 mg/dL Final    Total Protein 01/22/2025 6.9  6.0 - 8.5 g/dL Final    Albumin 01/22/2025 4.1  3.5 - 5.2 g/dL Final    Globulin 01/22/2025 2.8  gm/dL Final    A/G Ratio 01/22/2025 1.5  g/dL Final    Total Bilirubin 01/22/2025 0.9  0.0 - 1.2 mg/dL Final    Alkaline Phosphatase 01/22/2025 55  39 - 117 U/L Final    AST (SGOT) 01/22/2025 23  1 - 40 U/L Final    ALT (SGPT) 01/22/2025 25  1 - 41 U/L Final    Total Cholesterol 01/22/2025 170  0 - 200 mg/dL Final    Comment: Cholesterol Reference Ranges  (U.S. Department of Health and Human Services ATP III  Classifications)  Desirable          <200  mg/dL  Borderline High    200-239 mg/dL  High Risk          >240 mg/dL  Triglyceride Reference Ranges  (U.S. Department of Health and Human Services ATP III  Classifications)  Normal           <150 mg/dL  Borderline High  150-199 mg/dL  High             200-499 mg/dL  Very High        >500 mg/dL  HDL Reference Ranges  (U.S. Department of Health and Human Services ATP III  Classifications)  Low     <40 mg/dl (major risk factor for CHD)  High    >60 mg/dl ('negative' risk factor for CHD)  LDL Reference Ranges  (U.S. Department of Health and Human Services ATP III  Classifications)  Optimal          <100 mg/dL  Near Optimal     100-129 mg/dL  Borderline High  130-159 mg/dL  High             160-189 mg/dL  Very High        >189 mg/dL  LDL is calculated using the NIH LDL-C calculation.      Triglycerides 01/22/2025 80  0 - 150 mg/dL Final    HDL Cholesterol 01/22/2025 40  40 - 60 mg/dL Final    VLDL Cholesterol Cristobal 01/22/2025 15  5 - 40 mg/dL Final    LDL Chol Calc (NIH) 01/22/2025 115 (H)  0 - 100 mg/dL Final    Testosterone, Total 01/22/2025 454.3  264.0 - 916.0 ng/dL Final    Comment: This [facility] LC/MS-MS method is currently certified by the CDC  Hormone Standardization Program (HoSt). Adult male reference  interval is based on a population of healthy nonobese males  (BMI <30) between 19 and 39 years old. Abby et.al. JCEM  2017,102;9538-1443. PMID: 41145968.      Testosterone, Free 01/22/2025 5.4 (L)  8.7 - 25.1 pg/mL Final    Vitamin B-12 01/22/2025 301  211 - 946 pg/mL Final    Results may be falsely increased if patient taking Biotin.       PHQ-9 Total Score: 17    EDEN-7 Total Score:  12    Assessment & Plan    Diagnoses and all orders for this visit:    1. Attention deficit hyperactivity disorder (ADHD), combined type (Primary)    2. Severe episode of recurrent major depressive disorder, without psychotic features       Mr. Roscoe Arias is a 34-year-old male seen as a new patient to establish care for  ongoing psychiatric management as most recent psychiatric provider, MAURILIO Lowe is no longer employed with [facility] [employer].    Upon today's evaluation patient reports and displays numerous signs/symptoms most consistent with that of ADHD, combined type and major depressive disorder.  As detailed above patient does report experiencing numerous depressive symptoms throughout his adult life and reports first seeking psychiatric care for management of these symptoms approximately 1 to 2 years ago.  He has since consistently been taking a combination of citalopram, buspirone, and as needed trazodone which he does report has significantly improved psychiatric symptoms when compared to his clinical status prior to initiating these medications.  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  Patient does also report potential medical issues primarily involving low testosterone levels in which he was recently prescribed supplemental testosterone.  As such I recommend making no changes to the patient psychiatric medications at this time, but did discuss the possibility of making potential adjustments in the future if necessary.  Otherwise, patient did undergo neuropsychological testing via [facility] on 12/12/2023 which was reviewed in detail prior to today's visit.  This assessment did indicate a diagnosis of ADHD, combined type which is consistent with patient's clinical picture as detailed above.  Given rather significant improvement in ADHD symptoms associated with current dose of Vyvanse I recommend continuation of Vyvanse at current dose at this time.  Patient will be seen again in approximately 10  weeks for reassessment.  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  Patient voices understanding of this and is agreeable to today's plan.    Medications:  -Continue Vyvanse 40 mg p.o. once daily for management of ADHD, combined type.  -Continue citalopram 20 mg p.o. once daily, buspirone 5 mg p.o. 3 times daily, and trazodone 50 mg p.o. nightly as needed for acute insomnia as prescribed her primary care physician at this time.    TREATMENT PLAN - SHORT AND LONG-TERM GOALS:   -Continue supportive psychotherapy efforts and medications as indicated. Treatment and medication options discussed during today's visit.   -Patient acknowledged and verbally consented to continue with current treatment plan and was educated on the importance of compliance with treatment and follow-up appointments.    SUMMARY/EDUCATION/DISCUSSION:  -Pt was given appropriate time to ask questions and concerns were addressed. A thorough discussion was had that included review of disease process, need for continued monitoring and additional treatment options including use of pharmacological and non-pharmacological approaches to care, decisions were made and agreed upon by patient and provider.   -Discussed medication options and treatment plan of prescribed medication as well as the risks, benefits, and side effects including potential falls, possible impaired driving and metabolic adversities among others; patient acknowledged and provided verbal consent.   -Patient has been educated regarding multimodal approach with healthy nutrition, healthy sleep, regular physical activity, social activities, counseling, and medications.  -Please  call the office at [phone] within normal business hours (Monday-Friday, 8:00 AM - 4:30 PM) with any worsening of symptoms or onset of intolerable side effects. Please ask to leave a message with office staff.  Please allow up to 24-48 hours for response to a patient call/question/refill request.  -Safety plan has been established and discussed in detail with the patient, who is agreeable to contact support system and/or call 911 or go to the nearest ER should he/she/they have any thoughts of harm to self or others.    MEDS ORDERED DURING VISIT:  No orders of the defined types were placed in this encounter.      FOLLOW UP:  Return in about 10 weeks (around 4/14/2025) for Next scheduled follow up.      Rosendo Mercer DO    This document has been electronically signed by Rosendo Mercer DO  February 3, 2025 11:20 EST    Part of this note may be an electronic transcription/translation of spoken language to printed text using the Dragon Dictation System. Some of the data in this electronic note has been brought forward from a previous encounter, any necessary changes have been made, it has been reviewed by this provider, and it is accurate.

## 2025-02-07 ENCOUNTER — PATIENT ROUNDING (BHMG ONLY) (OUTPATIENT)
Age: 35
End: 2025-02-07
Payer: COMMERCIAL

## 2025-02-25 DIAGNOSIS — F90.2 ATTENTION DEFICIT HYPERACTIVITY DISORDER (ADHD), COMBINED TYPE: ICD-10-CM

## 2025-02-25 DIAGNOSIS — Z79.899 HIGH RISK MEDICATION USE: ICD-10-CM

## 2025-02-25 RX ORDER — LISDEXAMFETAMINE DIMESYLATE 40 MG/1
40 CAPSULE ORAL EVERY MORNING
Qty: 30 CAPSULE | Refills: 0 | Status: SHIPPED | OUTPATIENT
Start: 2025-02-25

## 2025-02-25 NOTE — TELEPHONE ENCOUNTER
Rx Refill Note  Requested Prescriptions     Pending Prescriptions Disp Refills    lisdexamfetamine (Vyvanse) 40 MG capsule 30 capsule 0     Sig: Take 1 capsule by mouth Every Morning      Last office visit with prescribing clinician: 2/3/2025   Last telemedicine visit with prescribing clinician: Visit date not found   Next office visit with prescribing clinician: 4/16/2025     Celi Allen MA  02/25/25, 15:57 EST

## 2025-03-14 DIAGNOSIS — I10 ESSENTIAL HYPERTENSION: ICD-10-CM

## 2025-03-14 RX ORDER — METOPROLOL SUCCINATE 100 MG/1
100 TABLET, EXTENDED RELEASE ORAL DAILY
Qty: 90 TABLET | Refills: 2 | Status: SHIPPED | OUTPATIENT
Start: 2025-03-14

## 2025-03-18 DIAGNOSIS — E29.1 HYPOGONADISM IN MALE: ICD-10-CM

## 2025-03-18 RX ORDER — TESTOSTERONE CYPIONATE 200 MG/ML
INJECTION, SOLUTION INTRAMUSCULAR
Qty: 2 ML | Refills: 0 | Status: SHIPPED | OUTPATIENT
Start: 2025-03-18

## 2025-03-29 DIAGNOSIS — E29.1 HYPOGONADISM IN MALE: ICD-10-CM

## 2025-03-30 RX ORDER — TESTOSTERONE CYPIONATE 200 MG/ML
100 INJECTION, SOLUTION INTRAMUSCULAR
Qty: 2 ML | Refills: 0 | Status: SHIPPED | OUTPATIENT
Start: 2025-03-30 | End: 2025-05-09

## 2025-04-09 DIAGNOSIS — F90.2 ATTENTION DEFICIT HYPERACTIVITY DISORDER (ADHD), COMBINED TYPE: ICD-10-CM

## 2025-04-09 DIAGNOSIS — G89.29 CHRONIC PAIN OF BOTH KNEES: ICD-10-CM

## 2025-04-09 DIAGNOSIS — M25.561 CHRONIC PAIN OF BOTH KNEES: ICD-10-CM

## 2025-04-09 DIAGNOSIS — M25.562 CHRONIC PAIN OF BOTH KNEES: ICD-10-CM

## 2025-04-09 DIAGNOSIS — Z79.899 HIGH RISK MEDICATION USE: ICD-10-CM

## 2025-04-09 RX ORDER — LISDEXAMFETAMINE DIMESYLATE 40 MG/1
40 CAPSULE ORAL EVERY MORNING
Qty: 30 CAPSULE | Refills: 0 | Status: SHIPPED | OUTPATIENT
Start: 2025-04-09

## 2025-04-09 RX ORDER — DICLOFENAC SODIUM 75 MG/1
75 TABLET, DELAYED RELEASE ORAL 2 TIMES DAILY
Qty: 60 TABLET | Refills: 2 | Status: SHIPPED | OUTPATIENT
Start: 2025-04-09

## 2025-04-14 DIAGNOSIS — K21.00 GASTROESOPHAGEAL REFLUX DISEASE WITH ESOPHAGITIS WITHOUT HEMORRHAGE: ICD-10-CM

## 2025-04-14 DIAGNOSIS — N52.9 ERECTILE DYSFUNCTION, UNSPECIFIED ERECTILE DYSFUNCTION TYPE: ICD-10-CM

## 2025-04-14 DIAGNOSIS — M10.9 GOUT, UNSPECIFIED CAUSE, UNSPECIFIED CHRONICITY, UNSPECIFIED SITE: Primary | ICD-10-CM

## 2025-04-14 RX ORDER — ALLOPURINOL 100 MG/1
TABLET ORAL
Qty: 90 TABLET | Refills: 3 | Status: SHIPPED | OUTPATIENT
Start: 2025-04-14

## 2025-04-14 RX ORDER — SILDENAFIL 50 MG/1
TABLET, FILM COATED ORAL
Qty: 30 TABLET | Refills: 3 | Status: SHIPPED | OUTPATIENT
Start: 2025-04-14

## 2025-04-14 RX ORDER — ESOMEPRAZOLE MAGNESIUM 40 MG/1
40 CAPSULE, DELAYED RELEASE ORAL
Qty: 90 CAPSULE | Refills: 3 | Status: SHIPPED | OUTPATIENT
Start: 2025-04-14

## 2025-04-16 ENCOUNTER — OFFICE VISIT (OUTPATIENT)
Age: 35
End: 2025-04-16
Payer: COMMERCIAL

## 2025-04-16 VITALS
WEIGHT: 295 LBS | BODY MASS INDEX: 36.68 KG/M2 | HEIGHT: 75 IN | HEART RATE: 89 BPM | DIASTOLIC BLOOD PRESSURE: 82 MMHG | SYSTOLIC BLOOD PRESSURE: 137 MMHG | OXYGEN SATURATION: 98 %

## 2025-04-16 DIAGNOSIS — F90.2 ATTENTION DEFICIT HYPERACTIVITY DISORDER (ADHD), COMBINED TYPE: ICD-10-CM

## 2025-04-16 DIAGNOSIS — Z79.899 HIGH RISK MEDICATION USE: ICD-10-CM

## 2025-04-16 RX ORDER — LISDEXAMFETAMINE DIMESYLATE 50 MG/1
50 CAPSULE ORAL EVERY MORNING
Qty: 30 CAPSULE | Refills: 0 | Status: SHIPPED | OUTPATIENT
Start: 2025-04-16

## 2025-04-16 NOTE — PROGRESS NOTES
"Beau Arias is a 34 y.o. male who presents today in follow up for management of ADHD, combined type and major depressive disorder.    Patient reports that he is doing fairly well overall but does endorse a perceived worsening of ADHD symptoms experienced most afternoon/early evenings over the past several weeks.  More specifically, patient reports increased frequency of \"spacing out\" experienced on a near daily basis each afternoon/early evening associated with difficulties sustaining his attention/concentration, increased distractibility, and increased difficulty initiating/completing necessary tasks.  He reports consistent compliance with his Vyvanse, denies any associated side effects and appears to be tolerating his medication well.  Otherwise continues to report appropriate management of his depressive and anxious symptoms associated with current medication regimen prescribed by primary care physician.  He does continue to endorse numerous psychosocial stressors primarily involving very busy work schedule, his recent marriage to his long-term significant other, and other rather traumatic events experienced while on the job over the past 24 hours.  Patient does feel as if he is keeping his hand over water and appropriately managing the psychosocial stressors at this time. Patient otherwise denies any auditory, visual, or tactile hallucinations and does not currently appear to be responding to internal stimuli.  Patient denies any generalized paranoia and displays no evidence of delusional thinking.    The following portions of the patient's history were reviewed and updated as appropriate: allergies, current medications, past family history, past medical history, past social history, past surgical history and problem list.       Past Medical History:  Past Medical History:   Diagnosis Date    Current severe episode of major depressive disorder without psychotic features 04/09/2024    Hypertension "     PTSD (post-traumatic stress disorder)     Sleep apnea 2021       Social History:  Social History     Socioeconomic History    Marital status: Significant Other   Tobacco Use    Smoking status: Former     Current packs/day: 0.00     Average packs/day: 0.5 packs/day for 2.0 years (1.0 ttl pk-yrs)     Types: Cigarettes     Start date: 2009     Quit date: 2011     Years since quittin.8     Passive exposure: Past    Smokeless tobacco: Former     Types: Chew   Vaping Use    Vaping status: Never Used   Substance and Sexual Activity    Alcohol use: Yes     Comment: 2-3X WEEKLY    Drug use: Never    Sexual activity: Defer       Family History:  Family History   Problem Relation Age of Onset    Obesity Mother     Hypertension Mother     Sleep apnea Mother     Obesity Sister     Hypertension Sister     Diabetes Maternal Grandmother     Hypertension Maternal Grandmother     Obesity Maternal Grandfather     Hypertension Maternal Grandfather     Diabetes Paternal Grandmother     Hypertension Paternal Grandmother     Cancer Paternal Grandfather     Malig Hyperthermia Neg Hx     Colon cancer Neg Hx     Colon polyps Neg Hx     Crohn's disease Neg Hx     Irritable bowel syndrome Neg Hx     Ulcerative colitis Neg Hx        Past Surgical History:  Past Surgical History:   Procedure Laterality Date    CHOLECYSTECTOMY WITH INTRAOPERATIVE CHOLANGIOGRAM N/A 2022    Procedure: Laparoscopic cholecystectomy with cholangiogram;  Surgeon: Maddi Peter MD;  Location: [facility] MAIN OR;  Service: General;  Laterality: N/A;    ERCP N/A 2022    Procedure: ENDOSCOPIC RETROGRADE CHOLANGIOPANCREATOGRAPHY WITH SPHINCTEROTOMY AND BALLOON SWEEP;  Surgeon: Colin Godwin MD;  Location: [facility] ENDOSCOPY;  Service: Gastroenterology;  Laterality: N/A;  PRE- COMMON BILE DUCT STONES  POST- SAME    GASTRIC SLEEVE LAPAROSCOPIC  2021    in [place]       Problem List:  Patient Active Problem List   Diagnosis     Chronic foot pain, left    Achilles tendon contracture due to non-neurologic cause    Essential hypertension    Class 3 severe obesity due to excess calories with serious comorbidity and body mass index (BMI) of 45.0 to 49.9 in adult    Sleep apnea    Gout    Acute calculous cholecystitis    Choledocholithiasis with acute cholecystitis with obstruction    Cholecystitis    Disease due to severe acute respiratory syndrome coronavirus 2 (SARS-CoV-2)    Current severe episode of major depressive disorder without psychotic features    Attention deficit hyperactivity disorder (ADHD), combined type    Acute cough    High risk medication use       Allergy:   No Known Allergies     Current Medications:   Current Outpatient Medications   Medication Sig Dispense Refill    lisdexamfetamine (Vyvanse) 50 MG capsule Take 1 capsule by mouth Every Morning 30 capsule 0    allopurinol (Zyloprim) 100 MG tablet Take 1 tab by mouth daily for gout 90 tablet 3    busPIRone (BUSPAR) 5 MG tablet TAKE ONE TABLET BY MOUTH THREE TIMES A DAY 90 tablet 5    citalopram (CeleXA) 20 MG tablet TAKE 1 TABLET BY MOUTH EVERY MORNING 90 tablet 1    colestipol (COLESTID) 1 g tablet Start with 1 tab twice daily and may increase by 1 tab per week based on bowel habits; max dose 6 tabs/day 120 tablet 5    diclofenac (VOLTAREN) 75 MG EC tablet Take 1 tablet by mouth 2 (Two) Times a Day. 60 tablet 2    esomeprazole (nexIUM) 40 MG capsule Take 1 capsule by mouth Every Morning Before Breakfast. 90 capsule 3    metoprolol succinate XL (TOPROL-XL) 100 MG 24 hr tablet TAKE 1 TABLET BY MOUTH DAILY 90 tablet 2    sildenafil (Viagra) 50 MG tablet Take 1/2 to 2 tablets daily as needed for erectile dysfunction 30 tablet 3    Testosterone Cypionate (DEPOTESTOTERONE CYPIONATE) 200 MG/ML injection Inject 0.5 mL into the appropriate muscle as directed by prescriber Every 10 (Ten) Days for 40 days. 2 mL 0    traZODone (DESYREL) 50 MG tablet TAKE ONE TABLET BY MOUTH ONCE  "NIGHTLY. DO NOT TAKE NIGHTS YOU ARE WORKING 30 tablet 5     No current facility-administered medications for this visit.       Review of Symptoms:    Review of Systems   Constitutional:  Positive for activity change and fatigue.   HENT:  Negative for tinnitus.    Endocrine: Negative for cold intolerance and heat intolerance.   Skin:  Negative for rash.   Neurological:  Negative for seizures and confusion.   Psychiatric/Behavioral:  Positive for decreased concentration, sleep disturbance and depressed mood. Negative for hallucinations, self-injury and suicidal ideas. The patient is nervous/anxious.          Physical Exam:   Blood pressure 137/82, pulse 89, height 190.5 cm (75\"), weight 134 kg (295 lb), SpO2 98%.  Appearance: Appears documented age, appropriate hygiene and grooming.  Gait, Station, Strength: Normal gait, station and strength.       Mental Status Exam:   Hygiene:   good  Cooperation:  Cooperative  Eye Contact:  Good  Psychomotor Behavior:  Appropriate  Affect:  Full range and Appropriate  Mood: normal and euthymic  Hopelessness: Denies  Speech:  Normal  Thought Process:  Goal directed and Linear  Thought Content:  Normal  Suicidal:  None  Homicidal:  None  Hallucinations:  None  Delusion:  None  Memory:  Intact  Orientation:  Person, Place, Time, and Situation  Reliability:  good  Insight:  Good  Judgement:  Good  Impulse Control:  Good      Lab Results:   No visits with results within 1 Month(s) from this visit.   Latest known visit with results is:   Office Visit on 01/22/2025   Component Date Value Ref Range Status    WBC 01/22/2025 5.60  3.40 - 10.80 10*3/mm3 Final    RBC 01/22/2025 4.80  4.14 - 5.80 10*6/mm3 Final    Hemoglobin 01/22/2025 13.5  13.0 - 17.7 g/dL Final    Hematocrit 01/22/2025 42.1  37.5 - 51.0 % Final    MCV 01/22/2025 87.7  79.0 - 97.0 fL Final    MCH 01/22/2025 28.1  26.6 - 33.0 pg Final    MCHC 01/22/2025 32.1  31.5 - 35.7 g/dL Final    RDW 01/22/2025 13.6  12.3 - 15.4 % Final "    Platelets 01/22/2025 189  140 - 450 10*3/mm3 Final    Neutrophil Rel % 01/22/2025 53.8  42.7 - 76.0 % Final    Lymphocyte Rel % 01/22/2025 31.4  19.6 - 45.3 % Final    Monocyte Rel % 01/22/2025 10.7  5.0 - 12.0 % Final    Eosinophil Rel % 01/22/2025 3.4  0.3 - 6.2 % Final    Basophil Rel % 01/22/2025 0.7  0.0 - 1.5 % Final    Neutrophils Absolute 01/22/2025 3.01  1.70 - 7.00 10*3/mm3 Final    Lymphocytes Absolute 01/22/2025 1.76  0.70 - 3.10 10*3/mm3 Final    Monocytes Absolute 01/22/2025 0.60  0.10 - 0.90 10*3/mm3 Final    Eosinophils Absolute 01/22/2025 0.19  0.00 - 0.40 10*3/mm3 Final    Basophils Absolute 01/22/2025 0.04  0.00 - 0.20 10*3/mm3 Final    Immature Granulocyte Rel % 01/22/2025 0.0  0.0 - 0.5 % Final    Immature Grans Absolute 01/22/2025 0.00  0.00 - 0.05 10*3/mm3 Final    nRBC 01/22/2025 0.0  0.0 - 0.2 /100 WBC Final    Glucose 01/22/2025 84  65 - 99 mg/dL Final    BUN 01/22/2025 9  6 - 20 mg/dL Final    Creatinine 01/22/2025 0.92  0.76 - 1.27 mg/dL Final    EGFR Result 01/22/2025 111.9  >60.0 mL/min/1.73 Final    Comment: GFR Categories in Chronic Kidney Disease (CKD)/X09/  /X09/  GFR Category          GFR (mL/min/1.73)    Interpretation  G1/X09/                    90 or greater/X09/        Normal or high  (1)  G2//                    60-89                Mild decrease  (1)  G3a                   45-59                Mild to moderate  decrease  G3b                   30-44                Moderate to  severe decrease  G4                    15-29                Severe decrease  G5                    14 or less           Kidney failure//  /[id number]/  (1)In the absence of evidence of kidney disease, neither  GFR category G1 or G2 fulfill the criteria for CKD.  eGFR calculation 2021 CKD-EPI creatinine equation, which  does not include race as a factor      BUN/Creatinine Ratio 01/22/2025 9.8  7.0 - 25.0 Final    Sodium 01/22/2025 140  136 - 145 mmol/L Final    Potassium 01/22/2025 4.2  3.5 -  5.2 mmol/L Final    Chloride 01/22/2025 104  98 - 107 mmol/L Final    Total CO2 01/22/2025 28.2  22.0 - 29.0 mmol/L Final    Calcium 01/22/2025 9.6  8.6 - 10.5 mg/dL Final    Total Protein 01/22/2025 6.9  6.0 - 8.5 g/dL Final    Albumin 01/22/2025 4.1  3.5 - 5.2 g/dL Final    Globulin 01/22/2025 2.8  gm/dL Final    A/G Ratio 01/22/2025 1.5  g/dL Final    Total Bilirubin 01/22/2025 0.9  0.0 - 1.2 mg/dL Final    Alkaline Phosphatase 01/22/2025 55  39 - 117 U/L Final    AST (SGOT) 01/22/2025 23  1 - 40 U/L Final    ALT (SGPT) 01/22/2025 25  1 - 41 U/L Final    Total Cholesterol 01/22/2025 170  0 - 200 mg/dL Final    Comment: Cholesterol Reference Ranges  (U.S. Department of Health and Human Services ATP III  Classifications)  Desirable          <200 mg/dL  Borderline High    200-239 mg/dL  High Risk          >240 mg/dL  Triglyceride Reference Ranges  (U.S. Department of Health and Human Services ATP III  Classifications)  Normal           <150 mg/dL  Borderline High  150-199 mg/dL  High             200-499 mg/dL  Very High        >500 mg/dL  HDL Reference Ranges  (U.S. Department of Health and Human Services ATP III  Classifications)  Low     <40 mg/dl (major risk factor for CHD)  High    >60 mg/dl ('negative' risk factor for CHD)  LDL Reference Ranges  (U.S. Department of Health and Human Services ATP III  Classifications)  Optimal          <100 mg/dL  Near Optimal     100-129 mg/dL  Borderline High  130-159 mg/dL  High             160-189 mg/dL  Very High        >189 mg/dL  LDL is calculated using the NIH LDL-C calculation.      Triglycerides 01/22/2025 80  0 - 150 mg/dL Final    HDL Cholesterol 01/22/2025 40  40 - 60 mg/dL Final    VLDL Cholesterol Cristobal 01/22/2025 15  5 - 40 mg/dL Final    LDL Chol Calc (NIH) 01/22/2025 115 (H)  0 - 100 mg/dL Final    Testosterone, Total 01/22/2025 454.3  264.0 - 916.0 ng/dL Final    Comment: [facility] method is currently certified by the CDC  Hormone Standardization  Program (HoSt). Adult male reference  interval is based on a population of healthy nonobese males  (BMI <30) between 19 and 39 years old. Abby, et.al. JCEM  2017,102;5516-1538. PMID: 80348809.      Testosterone, Free 01/22/2025 5.4 (L)  8.7 - 25.1 pg/mL Final    Vitamin B-12 01/22/2025 301  211 - 946 pg/mL Final    Results may be falsely increased if patient taking Biotin.       PHQ-9 Total Score: 10   EDEN-7 Total Score:  7    Assessment & Plan    Diagnoses and all orders for this visit:    1. Attention deficit hyperactivity disorder (ADHD), combined type  -     lisdexamfetamine (Vyvanse) 50 MG capsule; Take 1 capsule by mouth Every Morning  Dispense: 30 capsule; Refill: 0    2. High risk medication use  -     lisdexamfetamine (Vyvanse) 50 MG capsule; Take 1 capsule by mouth Every Morning  Dispense: 30 capsule; Refill: 0         Roscoe Arias is a 34 y.o. male who presents today in follow up for management of ADHD, combined type and major depressive disorder.    As detailed above patient does appear to be doing fairly well overall but reports a progressive worsening of ADHD symptoms experienced on a near daily basis around the afternoon/early evening.  He does report sustained improvement in numerous depressive and anxious symptoms associated with current medication regimen as evidenced by current PHQ-9 and EDEN-7 scores.  He reports consistent compliance with all psychiatric medications, denies any associated side effects and appears to be tolerating these medications well.  Given patient's exacerbation of ADHD symptoms as detailed above I do feel as if patient would benefit from further increasing daily dose of Vyvanse from 40 mg to 50 mg at this time.  I recommend making no further medication changes at this time.  Patient will be seen again in approximately 9 weeks for reassessment.  Patient voices understanding of this and is agreeable to today's plan.    Medications:  -Increase Vyvanse from 40 mg p.o. once  daily to 50 mg p.o. once daily for management of ADHD, combined type.  - Continue citalopram 20 mg p.o. once daily, buspirone 5 mg p.o. 3 times daily, and trazodone 50 mg p.o. nightly as needed for acute insomnia as prescribed by primary care physician.    TREATMENT PLAN - SHORT AND LONG-TERM GOALS:   -Continue supportive psychotherapy efforts and medications as indicated. Treatment and medication options discussed during today's visit.   -Patient acknowledged and verbally consented to continue with current treatment plan and was educated on the importance of compliance with treatment and follow-up appointments.    SUMMARY/EDUCATION/DISCUSSION:  -Pt was given appropriate time to ask questions and concerns were addressed. A thorough discussion was had that included review of disease process, need for continued monitoring and additional treatment options including use of pharmacological and non-pharmacological approaches to care, decisions were made and agreed upon by patient and provider.   -Discussed medication options and treatment plan of prescribed medication as well as the risks, benefits, and side effects including potential falls, possible impaired driving and metabolic adversities among others; patient acknowledged and provided verbal consent.   -Patient has been educated regarding multimodal approach with healthy nutrition, healthy sleep, regular physical activity, social activities, counseling, and medications.  -Please call the office at [phone] within normal business hours (Monday-Friday, 8:00 AM - 4:30 PM) with any worsening of symptoms or onset of intolerable side effects. Please ask to leave a message with office staff.  Please allow up to 24-48 hours for response to a patient call/question/refill request.  -Safety plan has been established and discussed in detail with the patient, who is agreeable to contact support system and/or call 911 or go to the nearest ER should he/she/they have any  thoughts of harm to self or others.    MEDS ORDERED DURING VISIT:  New Medications Ordered This Visit   Medications    lisdexamfetamine (Vyvanse) 50 MG capsule     Sig: Take 1 capsule by mouth Every Morning     Dispense:  30 capsule     Refill:  0       FOLLOW UP:  Return in about 9 weeks (around 6/18/2025) for Next scheduled follow up.      Rosendo Mercer DO    This document has been electronically signed by Rosendo Mercer DO  April 16, 2025 08:38 EDT    Part of this note may be an electronic transcription/translation of spoken language to printed text using the Dragon Dictation System. Some of the data in this electronic note has been brought forward from a previous encounter, any necessary changes have been made, it has been reviewed by this provider, and it is accurate.

## 2025-06-16 RX ORDER — PANTOPRAZOLE SODIUM 40 MG/1
40 TABLET, DELAYED RELEASE ORAL DAILY
Qty: 90 TABLET | Refills: 3 | OUTPATIENT
Start: 2025-06-16

## 2025-06-18 DIAGNOSIS — F90.2 ATTENTION DEFICIT HYPERACTIVITY DISORDER (ADHD), COMBINED TYPE: ICD-10-CM

## 2025-06-18 DIAGNOSIS — Z79.899 HIGH RISK MEDICATION USE: ICD-10-CM

## 2025-06-18 RX ORDER — LISDEXAMFETAMINE DIMESYLATE 50 MG/1
50 CAPSULE ORAL EVERY MORNING
Qty: 30 CAPSULE | Refills: 0 | Status: SHIPPED | OUTPATIENT
Start: 2025-06-18

## 2025-07-14 NOTE — PROGRESS NOTES
Subjective   Roscoe Arias is a 34 y.o. male. Presents today for   Chief Complaint   Patient presents with    Hypertension       History Of Present Illness Roscoe Arias is a 34-year-old male presenting today for 6-month follow-up    Depression - better on medications  Essential hypertension - more controlled  Gout - better on allopurinol    History of Present Illness  The patient is a 34-year-old male presenting today for medication management.    He reports an increase in anxiety attacks, which he attributes to heightened stress levels. His Vyvanse prescription was recently refilled. His physician has suggested that increasing the dosage of his medication may help manage this.    His gout condition has shown significant improvement. However, he notes that missing his medication for two consecutive days triggers symptoms, which then subside upon resuming the medication.    He has not taken his testosterone supplement for the past 1.5 months and is experiencing a noticeable difference in his energy levels. He is considering getting his labs done again.    Supplemental Information  His blood pressure was recorded as 130/88 this morning.    MEDICATIONS  Vyvanse    Patient Active Problem List   Diagnosis    Chronic foot pain, left    Achilles tendon contracture due to non-neurologic cause    Essential hypertension    Class 3 severe obesity due to excess calories with serious comorbidity and body mass index (BMI) of 45.0 to 49.9 in adult    Sleep apnea    Gout    Acute calculous cholecystitis    Choledocholithiasis with acute cholecystitis with obstruction    Cholecystitis    Disease due to severe acute respiratory syndrome coronavirus 2 (SARS-CoV-2)    Current severe episode of major depressive disorder without psychotic features    Attention deficit hyperactivity disorder (ADHD), combined type    Acute cough    High risk medication use       Social History     Socioeconomic History    Marital status: Significant  Other   Tobacco Use    Smoking status: Former     Current packs/day: 0.00     Average packs/day: 0.5 packs/day for 2.0 years (1.0 ttl pk-yrs)     Types: Cigarettes     Start date: 2009     Quit date: 2011     Years since quittin.1     Passive exposure: Past    Smokeless tobacco: Former     Types: Chew   Vaping Use    Vaping status: Never Used   Substance and Sexual Activity    Alcohol use: Yes     Comment: 2-3X WEEKLY    Drug use: Never    Sexual activity: Yes     Partners: Female     Birth control/protection: None       No Known Allergies    Current Outpatient Medications on File Prior to Visit   Medication Sig Dispense Refill    allopurinol (Zyloprim) 100 MG tablet Take 1 tab by mouth daily for gout 90 tablet 3    busPIRone (BUSPAR) 5 MG tablet TAKE ONE TABLET BY MOUTH THREE TIMES A DAY 90 tablet 5    citalopram (CeleXA) 40 MG tablet Take 1 tablet by mouth Every Morning. 30 tablet 0    colestipol (COLESTID) 1 g tablet Start with 1 tab twice daily and may increase by 1 tab per week based on bowel habits; max dose 6 tabs/day 120 tablet 5    diclofenac (VOLTAREN) 75 MG EC tablet Take 1 tablet by mouth 2 (Two) Times a Day. 60 tablet 2    esomeprazole (nexIUM) 40 MG capsule Take 1 capsule by mouth Every Morning Before Breakfast. 90 capsule 3    lisdexamfetamine (Vyvanse) 50 MG capsule Take 1 capsule by mouth Every Morning 30 capsule 0    metoprolol succinate XL (TOPROL-XL) 100 MG 24 hr tablet TAKE 1 TABLET BY MOUTH DAILY 90 tablet 2    sildenafil (Viagra) 50 MG tablet Take 1/2 to 2 tablets daily as needed for erectile dysfunction 30 tablet 3    traZODone (DESYREL) 50 MG tablet TAKE ONE TABLET BY MOUTH ONCE NIGHTLY. DO NOT TAKE NIGHTS YOU ARE WORKING 30 tablet 5    [DISCONTINUED] citalopram (CeleXA) 20 MG tablet TAKE 1 TABLET BY MOUTH EVERY MORNING 90 tablet 1    [DISCONTINUED] lisdexamfetamine (Vyvanse) 50 MG capsule Take 1 capsule by mouth Every Morning 30 capsule 0     No current facility-administered  "medications on file prior to visit.       Objective   Vitals:    07/15/25 1513   BP: 144/88   Pulse: 64   SpO2: 95%   Weight: 132 kg (292 lb)   Height: 190.5 cm (75\")     Body mass index is 36.5 kg/m².    Physical Exam  Heart murmur noted.  Physical Exam  HENT:      Right Ear: Tympanic membrane and ear canal normal.      Left Ear: Tympanic membrane and ear canal normal.      Nose: Nose normal.      Mouth/Throat:      Mouth: Mucous membranes are moist.   Eyes:      Pupils: Pupils are equal, round, and reactive to light.   Cardiovascular:      Rate and Rhythm: Normal rate and regular rhythm.   Pulmonary:      Effort: Pulmonary effort is normal.      Breath sounds: Normal breath sounds.   Abdominal:      General: Abdomen is flat. Bowel sounds are normal.      Palpations: Abdomen is soft.   Neurological:      General: No focal deficit present.      Mental Status: He is alert.     Results         Procedures     Assessment & Plan   There are no diagnoses linked to this encounter.     Assessment & Plan  1. Anxiety.  He reports increased anxiety attacks, likely due to recent stressors. His current medication dosage has been adjusted by his other physician to help manage these symptoms.    2. Gout.  He reports that his gout symptoms have improved with his current medication regimen. He notes that missing doses for 2 days leads to the return of symptoms.    3. Low testosterone.  He reports feeling a difference due to not having his testosterone treatment for a month and a half. He is advised to come in before 8:00 AM for the lab draw.                 No follow-ups on file.     Discussed Care Gaps, ordered referrals and encouraged vaccination updates.       - Pt agrees with plan of care and denies further questions/concerns today  - This document is intended for medical expert use only. Persons  reading this document without medical staff guidance may result in misinterpretation and unintended morbidity     Go to the ER for " any possible life-threatening symptoms such as chest pain or shortness of air.      Please allow 3-5 business days for recommendations based on new results      I personally spent time with this patient, preparing for the visit, reviewing tests, obtaining and/or reviewing a separately obtained history, performing a medically appropriate examination and/or evaluation, counseling and educating the patient/family/caregiver, ordering medications,  documenting information in the medical record and indepentently interpreting results.         Patient or patient representative verbalized consent for the use of Ambient Listening during the visit with  MAURILIO Kumar for chart documentation. 7/15/2025  15:38 EDT

## 2025-07-15 ENCOUNTER — OFFICE VISIT (OUTPATIENT)
Age: 35
End: 2025-07-15
Payer: COMMERCIAL

## 2025-07-15 ENCOUNTER — OFFICE VISIT (OUTPATIENT)
Dept: FAMILY MEDICINE CLINIC | Facility: CLINIC | Age: 35
End: 2025-07-15
Payer: COMMERCIAL

## 2025-07-15 VITALS
OXYGEN SATURATION: 95 % | HEART RATE: 64 BPM | WEIGHT: 292 LBS | BODY MASS INDEX: 36.31 KG/M2 | DIASTOLIC BLOOD PRESSURE: 88 MMHG | HEIGHT: 75 IN | SYSTOLIC BLOOD PRESSURE: 144 MMHG

## 2025-07-15 VITALS
DIASTOLIC BLOOD PRESSURE: 85 MMHG | BODY MASS INDEX: 36.68 KG/M2 | SYSTOLIC BLOOD PRESSURE: 131 MMHG | OXYGEN SATURATION: 98 % | WEIGHT: 295 LBS | HEART RATE: 82 BPM | HEIGHT: 75 IN

## 2025-07-15 DIAGNOSIS — F90.2 ATTENTION DEFICIT HYPERACTIVITY DISORDER (ADHD), COMBINED TYPE: Primary | ICD-10-CM

## 2025-07-15 DIAGNOSIS — F33.2 SEVERE EPISODE OF RECURRENT MAJOR DEPRESSIVE DISORDER, WITHOUT PSYCHOTIC FEATURES: ICD-10-CM

## 2025-07-15 DIAGNOSIS — Z79.899 HIGH RISK MEDICATION USE: ICD-10-CM

## 2025-07-15 DIAGNOSIS — E29.1 HYPOGONADISM IN MALE: Primary | ICD-10-CM

## 2025-07-15 PROCEDURE — 99213 OFFICE O/P EST LOW 20 MIN: CPT | Performed by: NURSE PRACTITIONER

## 2025-07-15 RX ORDER — LISDEXAMFETAMINE DIMESYLATE 50 MG/1
50 CAPSULE ORAL EVERY MORNING
Qty: 30 CAPSULE | Refills: 0 | Status: SHIPPED | OUTPATIENT
Start: 2025-07-15

## 2025-07-15 RX ORDER — CITALOPRAM HYDROBROMIDE 40 MG/1
40 TABLET ORAL EVERY MORNING
Qty: 30 TABLET | Refills: 0 | Status: SHIPPED | OUTPATIENT
Start: 2025-07-15

## 2025-07-15 NOTE — PROGRESS NOTES
Subjective   Roscoe Arias is a 34 y.o. male who presents today in follow up for management of ADHD, combined type and major depressive disorder.    Patient reports a progressive worsening of various psychiatric symptoms were compared to clinical status at last visit.  More specifically, patient reports a worsening mood associated with persistent anhedonia, decreased levels of energy/fatigue, difficulties initiating/maintaining sleep, low self worth, excessive feelings of guilt, difficulties directing his attention/concentration, and noticeable psychomotor retardation.  Patient also reports worsening anxiety associated with acute episodes of anxiety that do occur abruptly without any specific trigger.  These episodes are described as sudden onset tachycardia associated with frequent catastrophizing and thinking worst case scenarios.  Patient otherwise does report adequate management of ADHD symptoms associated with current dosage of Vyvanse.  He reports consistent compliance with all psychiatric medications, denies any associated side effects and appears to be tolerating his medications well.  It is of note the patient does associate's recent worsening of psychiatric symptoms with ongoing psychosocial stressors both at home and at work primarily involving interpersonal relationship difficulties with multiple coworkers and the recent death of his grandmother. Patient otherwise denies any auditory, visual, or tactile hallucinations and does not currently appear to be responding to internal stimuli.  Patient denies any generalized paranoia and displays no evidence of delusional thinking.    The following portions of the patient's history were reviewed and updated as appropriate: allergies, current medications, past family history, past medical history, past social history, past surgical history and problem list.  History of Present Illness               Past Medical History:  Past Medical History:   Diagnosis Date     Current severe episode of major depressive disorder without psychotic features 2024    Hypertension     PTSD (post-traumatic stress disorder)     Sleep apnea 2021       Social History:  Social History     Socioeconomic History    Marital status: Significant Other   Tobacco Use    Smoking status: Former     Current packs/day: 0.00     Average packs/day: 0.5 packs/day for 2.0 years (1.0 ttl pk-yrs)     Types: Cigarettes     Start date: 2009     Quit date: 2011     Years since quittin.1     Passive exposure: Past    Smokeless tobacco: Former     Types: Chew   Vaping Use    Vaping status: Never Used   Substance and Sexual Activity    Alcohol use: Yes     Comment: 2-3X WEEKLY    Drug use: Never    Sexual activity: Defer       Family History:  Family History   Problem Relation Age of Onset    Obesity Mother     Hypertension Mother     Sleep apnea Mother     Obesity Sister     Hypertension Sister     Diabetes Maternal Grandmother     Hypertension Maternal Grandmother     Obesity Maternal Grandfather     Hypertension Maternal Grandfather     Diabetes Paternal Grandmother     Hypertension Paternal Grandmother     Cancer Paternal Grandfather     Malig Hyperthermia Neg Hx     Colon cancer Neg Hx     Colon polyps Neg Hx     Crohn's disease Neg Hx     Irritable bowel syndrome Neg Hx     Ulcerative colitis Neg Hx        Past Surgical History:  Past Surgical History:   Procedure Laterality Date    CHOLECYSTECTOMY WITH INTRAOPERATIVE CHOLANGIOGRAM N/A 2022    Procedure: Laparoscopic cholecystectomy with cholangiogram;  Surgeon: Maddi Peter MD;  Location: [facility] MAIN OR;  Service: General;  Laterality: N/A;    ERCP N/A 2022    Procedure: ENDOSCOPIC RETROGRADE CHOLANGIOPANCREATOGRAPHY WITH SPHINCTEROTOMY AND BALLOON SWEEP;  Surgeon: Colin Godwin MD;  Location: [facility] ENDOSCOPY;  Service: Gastroenterology;  Laterality: N/A;  PRE- COMMON BILE DUCT STONES  POST- SAME    GASTRIC SLEEVE  LAPAROSCOPIC  11/08/2021    in [place]       Problem List:  Patient Active Problem List   Diagnosis    Chronic foot pain, left    Achilles tendon contracture due to non-neurologic cause    Essential hypertension    Class 3 severe obesity due to excess calories with serious comorbidity and body mass index (BMI) of 45.0 to 49.9 in adult    Sleep apnea    Gout    Acute calculous cholecystitis    Choledocholithiasis with acute cholecystitis with obstruction    Cholecystitis    Disease due to severe acute respiratory syndrome coronavirus 2 (SARS-CoV-2)    Current severe episode of major depressive disorder without psychotic features    Attention deficit hyperactivity disorder (ADHD), combined type    Acute cough    High risk medication use       Allergy:   No Known Allergies     Current Medications:   Current Outpatient Medications   Medication Sig Dispense Refill    citalopram (CeleXA) 40 MG tablet Take 1 tablet by mouth Every Morning. 30 tablet 0    lisdexamfetamine (Vyvanse) 50 MG capsule Take 1 capsule by mouth Every Morning 30 capsule 0    allopurinol (Zyloprim) 100 MG tablet Take 1 tab by mouth daily for gout 90 tablet 3    busPIRone (BUSPAR) 5 MG tablet TAKE ONE TABLET BY MOUTH THREE TIMES A DAY 90 tablet 5    colestipol (COLESTID) 1 g tablet Start with 1 tab twice daily and may increase by 1 tab per week based on bowel habits; max dose 6 tabs/day 120 tablet 5    diclofenac (VOLTAREN) 75 MG EC tablet Take 1 tablet by mouth 2 (Two) Times a Day. 60 tablet 2    esomeprazole (nexIUM) 40 MG capsule Take 1 capsule by mouth Every Morning Before Breakfast. 90 capsule 3    metoprolol succinate XL (TOPROL-XL) 100 MG 24 hr tablet TAKE 1 TABLET BY MOUTH DAILY 90 tablet 2    sildenafil (Viagra) 50 MG tablet Take 1/2 to 2 tablets daily as needed for erectile dysfunction 30 tablet 3    traZODone (DESYREL) 50 MG tablet TAKE ONE TABLET BY MOUTH ONCE NIGHTLY. DO NOT TAKE NIGHTS YOU ARE WORKING 30 tablet 5     No current  "facility-administered medications for this visit.       Review of Symptoms:    Review of Systems   Constitutional:  Positive for fatigue. Negative for activity change.   HENT:  Negative for tinnitus.    Eyes:  Negative for visual disturbance.   Respiratory:  Negative for shortness of breath.    Cardiovascular:  Negative for chest pain and palpitations.   Gastrointestinal:  Negative for nausea.   Endocrine: Negative for cold intolerance and heat intolerance.   Neurological:  Negative for dizziness and confusion.   Psychiatric/Behavioral:  Positive for decreased concentration, sleep disturbance and depressed mood. Negative for hallucinations, self-injury and suicidal ideas. The patient is nervous/anxious.          Physical Exam:   Blood pressure 131/85, pulse 82, height 190.5 cm (75\"), weight 134 kg (295 lb), SpO2 98%.  Appearance: Appears documented age, appropriate hygiene and grooming.  Gait, Station, Strength: Normal gait, station and strength.  Physical Exam               Mental Status Exam:   Hygiene:   good  Cooperation:  Cooperative  Eye Contact:  Good  Psychomotor Behavior:  Slow  Affect:  Appropriate and Restricted  Mood: sad and depressed  Hopelessness: Denies  Speech:  Normal  Thought Process:  Goal directed and Linear  Thought Content:  Normal  Suicidal:  None  Homicidal:  None  Hallucinations:  None  Delusion:  None  Memory:  Intact  Orientation:  Person, Place, Time, and Situation  Reliability:  good  Insight:  Good  Judgement:  Good  Impulse Control:  Good      Lab Results:   Lab Requisition on 07/11/2025   Component Date Value Ref Range Status    Glucose 07/11/2025 86  65 - 99 mg/dL Final    BUN 07/11/2025 10.0  6.0 - 20.0 mg/dL Final    Sodium 07/11/2025 139  136 - 145 mmol/L Final    Potassium 07/11/2025 3.8  3.5 - 5.2 mmol/L Final    Chloride 07/11/2025 103  98 - 107 mmol/L Final    CO2 07/11/2025 25.0  22.0 - 29.0 mmol/L Final    Calcium 07/11/2025 9.3  8.6 - 10.5 mg/dL Final    Albumin 07/11/2025 " 4.3  3.5 - 5.2 g/dL Final    Total Protein 07/11/2025 7.5  6.0 - 8.5 g/dL Final    AST (SGOT) 07/11/2025 27  1 - 40 U/L Final    ALT (SGPT) 07/11/2025 34  1 - 41 U/L Final    Alkaline Phosphatase 07/11/2025 61  39 - 117 U/L Final    Total Bilirubin 07/11/2025 0.6  0.0 - 1.2 mg/dL Final    Bilirubin, Direct 07/11/2025 0.2  0.0 - 0.3 mg/dL Final    Iron 07/11/2025 61  59 - 158 mcg/dL Final    GGT 07/11/2025 17  8 - 61 U/L Final    Phosphorus 07/11/2025 3.0  2.5 - 4.5 mg/dL Final    LDH 07/11/2025 176  135 - 225 U/L Final    Uric Acid 07/11/2025 7.0  3.4 - 7.0 mg/dL Final    Creatinine 07/11/2025 0.92  0.76 - 1.27 mg/dL Final    Globulin 07/11/2025 3.2  gm/dL Final    A/G Ratio 07/11/2025 1.3  g/dL Final    eGFR 07/11/2025 111.9  >60.0 mL/min/1.73 Final    Total Cholesterol 07/11/2025 208 (H)  0 - 200 mg/dL Final    Triglycerides 07/11/2025 93  0 - 150 mg/dL Final    HDL Cholesterol 07/11/2025 42  40 - 60 mg/dL Final    LDL Cholesterol  07/11/2025 149 (H)  0 - 100 mg/dL Final    VLDL Cholesterol 07/11/2025 17  5 - 40 mg/dL Final    LDL/HDL Ratio 07/11/2025 3.51   Final    WBC 07/11/2025 7.00  3.40 - 10.80 10*3/mm3 Final    RBC 07/11/2025 5.09  4.14 - 5.80 10*6/mm3 Final    Hemoglobin 07/11/2025 14.8  13.0 - 17.7 g/dL Final    Hematocrit 07/11/2025 44.2  37.5 - 51.0 % Final    MCV 07/11/2025 86.8  79.0 - 97.0 fL Final    MCH 07/11/2025 29.1  26.6 - 33.0 pg Final    MCHC 07/11/2025 33.5  31.5 - 35.7 g/dL Final    RDW 07/11/2025 14.2  12.3 - 15.4 % Final    RDW-SD 07/11/2025 45.1  37.0 - 54.0 fl Final    MPV 07/11/2025 11.6  6.0 - 12.0 fL Final    Platelets 07/11/2025 195  140 - 450 10*3/mm3 Final    Neutrophil % 07/11/2025 58.2  42.7 - 76.0 % Final    Lymphocyte % 07/11/2025 25.9  19.6 - 45.3 % Final    Monocyte % 07/11/2025 8.4  5.0 - 12.0 % Final    Eosinophil % 07/11/2025 6.1  0.3 - 6.2 % Final    Basophil % 07/11/2025 1.3  0.0 - 1.5 % Final    Immature Grans % 07/11/2025 0.1  0.0 - 0.5 % Final    Neutrophils,  Absolute 07/11/2025 4.07  1.70 - 7.00 10*3/mm3 Final    Lymphocytes, Absolute 07/11/2025 1.81  0.70 - 3.10 10*3/mm3 Final    Monocytes, Absolute 07/11/2025 0.59  0.10 - 0.90 10*3/mm3 Final    Eosinophils, Absolute 07/11/2025 0.43 (H)  0.00 - 0.40 10*3/mm3 Final    Basophils, Absolute 07/11/2025 0.09  0.00 - 0.20 10*3/mm3 Final    Immature Grans, Absolute 07/11/2025 0.01  0.00 - 0.05 10*3/mm3 Final    nRBC 07/11/2025 0.0  0.0 - 0.2 /100 WBC Final    Color, UA 07/11/2025 Yellow  Yellow, Straw Final    Appearance, UA 07/11/2025 Clear  Clear Final    pH, UA 07/11/2025 5.5  5.0 - 8.0 Final    Specific Gravity, UA 07/11/2025 1.024  1.005 - 1.030 Final    Glucose, UA 07/11/2025 Negative  Negative Final    Ketones, UA 07/11/2025 Negative  Negative Final    Bilirubin, UA 07/11/2025 Negative  Negative Final    Blood, UA 07/11/2025 Negative  Negative Final    Protein, UA 07/11/2025 Negative  Negative Final    Leuk Esterase, UA 07/11/2025 Negative  Negative Final    Nitrite, UA 07/11/2025 Negative  Negative Final    Urobilinogen, UA 07/11/2025 1.0 E.U./dL  0.2 - 1.0 E.U./dL Final     Results            PHQ-9 Total Score: 16   EDEN-7 Total Score: 13     Assessment & Plan    Diagnoses and all orders for this visit:    1. Attention deficit hyperactivity disorder (ADHD), combined type (Primary)  -     lisdexamfetamine (Vyvanse) 50 MG capsule; Take 1 capsule by mouth Every Morning  Dispense: 30 capsule; Refill: 0    2. Severe episode of recurrent major depressive disorder, without psychotic features  -     citalopram (CeleXA) 40 MG tablet; Take 1 tablet by mouth Every Morning.  Dispense: 30 tablet; Refill: 0    3. High risk medication use  -     lisdexamfetamine (Vyvanse) 50 MG capsule; Take 1 capsule by mouth Every Morning  Dispense: 30 capsule; Refill: 0         Roscoe AURELIA Hugo is a 34 y.o. male who presents today in follow up for management of ADHD, combined type and major depressive disorder.    As detailed above patient reports  the progressive worsening of various psychiatric symptoms when compared to clinical status at last visit.  He reports consistent compliance with all psychiatric medications, denies any associated side effects and appears to be tolerating his medications well.  Given recent worsening of depressive and anxious symptoms I do recommend further increasing daily dosage of citalopram from 20 to 40 mg p.o. once daily at this time.  We will make no further medication adjustments and patient was encouraged to remain compliant with current dosage of Vyvanse and with his prescribed buspirone.  Patient will be seen again here in the clinic in approximately 4 weeks for reassessment.  Patient voices understanding of this and is agreeable to today's plan.    Medications:  - Continue increased dose of Vyvanse 50 mg p.o. once daily for management of ADHD, combined type.  - Increase citalopram from 20 to 40 mg p.o. once daily for management of major depressive disorder and to address anxious symptoms.  - Continue buspirone 5 mg p.o. 3 times daily, and trazodone 50 mg p.o. nightly as needed for acute insomnia as prescribed by primary care physician.    TREATMENT PLAN - SHORT AND LONG-TERM GOALS:   -Continue supportive psychotherapy efforts and medications as indicated. Treatment and medication options discussed during today's visit.   -Patient acknowledged and verbally consented to continue with current treatment plan and was educated on the importance of compliance with treatment and follow-up appointments.    SUMMARY/EDUCATION/DISCUSSION:  -Pt was given appropriate time to ask questions and concerns were addressed. A thorough discussion was had that included review of disease process, need for continued monitoring and additional treatment options including use of pharmacological and non-pharmacological approaches to care, decisions were made and agreed upon by patient and provider.   -Discussed medication options and treatment plan  of prescribed medication as well as the risks, benefits, and side effects including potential falls, possible impaired driving and metabolic adversities among others; patient acknowledged and provided verbal consent.   -Patient has been educated regarding multimodal approach with healthy nutrition, healthy sleep, regular physical activity, social activities, counseling, and medications.  -Please call the office at [phone] within normal business hours (Monday-Friday, 8:00 AM - 4:30 PM) with any worsening of symptoms or onset of intolerable side effects. Please ask to leave a message with office staff.  Please allow up to 24-48 hours for response to a patient call/question/refill request.  -Safety plan has been established and discussed in detail with the patient, who is agreeable to contact support system and/or call 911 or go to the nearest ER should he/she/they have any thoughts of harm to self or others.    MEDS ORDERED DURING VISIT:  New Medications Ordered This Visit   Medications    citalopram (CeleXA) 40 MG tablet     Sig: Take 1 tablet by mouth Every Morning.     Dispense:  30 tablet     Refill:  0    lisdexamfetamine (Vyvanse) 50 MG capsule     Sig: Take 1 capsule by mouth Every Morning     Dispense:  30 capsule     Refill:  0       FOLLOW UP:  Return in about 4 weeks (around 8/12/2025) for Next scheduled follow up.      Rosendo Mercer DO    This document has been electronically signed by Rosendo Mercer DO  July 15, 2025 09:12 EDT    Part of this note may be an electronic transcription/translation of spoken language to printed text using the Dragon Dictation System. Some of the data in this electronic note has been brought forward from a previous encounter, any necessary changes have been made, it has been reviewed by this provider, and it is accurate.    Answers submitted by the patient for this visit:  Anxiety (Submitted on 7/8/2025)  Chief Complaint: Anxiety  Visit: follow-up  Frequency:  most days  Severity: moderate  dry mouth: No  excessive worry: Yes  insomnia: Yes  irritability: Yes  malaise/fatigue: Yes  obsessions: No  Sleep quality: fair  Hours of sleep per night: 4 Hours  Medication compliance: %  Side effects: sexual problems

## 2025-08-07 ENCOUNTER — PATIENT MESSAGE (OUTPATIENT)
Dept: FAMILY MEDICINE CLINIC | Facility: CLINIC | Age: 35
End: 2025-08-07
Payer: COMMERCIAL

## 2025-08-07 DIAGNOSIS — E29.1 HYPOGONADISM IN MALE: Primary | ICD-10-CM

## 2025-08-09 LAB
TESTOST FREE SERPL-MCNC: 3.8 PG/ML (ref 8.7–25.1)
TESTOST SERPL-MCNC: 435.6 NG/DL (ref 264–916)

## 2025-08-10 ENCOUNTER — RESULTS FOLLOW-UP (OUTPATIENT)
Dept: FAMILY MEDICINE CLINIC | Facility: CLINIC | Age: 35
End: 2025-08-10
Payer: COMMERCIAL

## 2025-08-10 DIAGNOSIS — E29.1 HYPOGONADISM IN MALE: Primary | ICD-10-CM

## 2025-08-10 RX ORDER — TESTOSTERONE CYPIONATE 200 MG/ML
100 INJECTION, SOLUTION INTRAMUSCULAR
Qty: 6 ML | Refills: 1 | Status: SHIPPED | OUTPATIENT
Start: 2025-08-10

## 2025-08-11 ENCOUNTER — OFFICE VISIT (OUTPATIENT)
Age: 35
End: 2025-08-11
Payer: COMMERCIAL

## 2025-08-11 VITALS
HEIGHT: 75 IN | OXYGEN SATURATION: 98 % | DIASTOLIC BLOOD PRESSURE: 2 MMHG | SYSTOLIC BLOOD PRESSURE: 142 MMHG | BODY MASS INDEX: 36.06 KG/M2 | WEIGHT: 290 LBS | HEART RATE: 90 BPM

## 2025-08-11 DIAGNOSIS — F33.2 SEVERE EPISODE OF RECURRENT MAJOR DEPRESSIVE DISORDER, WITHOUT PSYCHOTIC FEATURES: ICD-10-CM

## 2025-08-11 DIAGNOSIS — F90.2 ATTENTION DEFICIT HYPERACTIVITY DISORDER (ADHD), COMBINED TYPE: Primary | ICD-10-CM

## 2025-08-12 DIAGNOSIS — F33.2 SEVERE EPISODE OF RECURRENT MAJOR DEPRESSIVE DISORDER, WITHOUT PSYCHOTIC FEATURES: ICD-10-CM

## 2025-08-13 RX ORDER — CITALOPRAM HYDROBROMIDE 40 MG/1
40 TABLET ORAL EVERY MORNING
Qty: 90 TABLET | Refills: 0 | Status: SHIPPED | OUTPATIENT
Start: 2025-08-13

## 2025-08-27 DIAGNOSIS — Z79.899 HIGH RISK MEDICATION USE: ICD-10-CM

## 2025-08-27 DIAGNOSIS — F90.2 ATTENTION DEFICIT HYPERACTIVITY DISORDER (ADHD), COMBINED TYPE: ICD-10-CM

## 2025-08-28 RX ORDER — LISDEXAMFETAMINE DIMESYLATE 50 MG/1
50 CAPSULE ORAL EVERY MORNING
Qty: 30 CAPSULE | Refills: 0 | Status: SHIPPED | OUTPATIENT
Start: 2025-08-28

## (undated) DEVICE — ENDOCUT SCISSOR TIP, DISPOSABLE: Brand: RENEW

## (undated) DEVICE — GOWN ,SIRUS,NONREINFORCED SMALL: Brand: MEDLINE

## (undated) DEVICE — RETRIEVAL BALLOON CATHETER: Brand: EXTRACTOR™ PRO RX

## (undated) DEVICE — ADHS SKIN SURG TISS VISC PREMIERPRO EXOFIN HI/VISC FAST/DRY

## (undated) DEVICE — ENDOPATH XCEL UNIVERSAL TROCAR STABLILITY SLEEVES: Brand: ENDOPATH XCEL

## (undated) DEVICE — GLV SURG BIOGEL LTX PF 6

## (undated) DEVICE — ANTIBACTERIAL UNDYED BRAIDED (POLYGLACTIN 910), SYNTHETIC ABSORBABLE SUTURE: Brand: COATED VICRYL

## (undated) DEVICE — ENDOPATH XCEL BLADELESS TROCARS WITH STABILITY SLEEVES: Brand: ENDOPATH XCEL

## (undated) DEVICE — ENDOPATH XCEL BLUNT TIP TROCARS WITH SMOOTH SLEEVES: Brand: ENDOPATH XCEL

## (undated) DEVICE — STPCK 3WY D201 DISCOFIX

## (undated) DEVICE — BITEBLOCK OMNI BLOC

## (undated) DEVICE — KT ORCA ORCAPOD DISP STRL

## (undated) DEVICE — TUBING, SUCTION, 1/4" X 10', STRAIGHT: Brand: MEDLINE

## (undated) DEVICE — ADAPT CLN BIOGUARD AIR/H2O DISP

## (undated) DEVICE — SUT VIC 0/0 UR6 27IN DYED J603H

## (undated) DEVICE — ENDOPOUCH RETRIEVER SPECIMEN RETRIEVAL BAGS: Brand: ENDOPOUCH RETRIEVER

## (undated) DEVICE — LOU LAP CHOLE: Brand: MEDLINE INDUSTRIES, INC.

## (undated) DEVICE — CATH IV INSYTE AUTOGARD 14G 1 1/2IN ORNG

## (undated) DEVICE — CONTAINER,SPECIMEN,OR STERILE,4OZ: Brand: MEDLINE

## (undated) DEVICE — ERBE NESSY®PLATE 170 SPLIT; 168CM²; CABLE 3M: Brand: ERBE

## (undated) DEVICE — VISUALIZATION SYSTEM: Brand: CLEARIFY

## (undated) DEVICE — SENSR O2 OXIMAX FNGR A/ 18IN NONSTR

## (undated) DEVICE — LN SMPL CO2 SHTRM SD STREAM W/M LUER

## (undated) DEVICE — CATH CHOLANG 4.5F18IN BRGNDY

## (undated) DEVICE — DRAPE,REIN 53X77,STERILE: Brand: MEDLINE

## (undated) DEVICE — APPL CHLORAPREP HI/LITE 26ML ORNG

## (undated) DEVICE — DEV LK WIREGUIDE FUSN OLYMP SCP

## (undated) DEVICE — SOL NACL 0.9PCT 1000ML

## (undated) DEVICE — SPHINCTEROTOME: Brand: HYDRATOME RX 44

## (undated) DEVICE — EXTENSION SET, MALE LUER LOCK ADAPTER WITH RETRACTABLE COLLAR

## (undated) DEVICE — CANN O2 ETCO2 FITS ALL CONN CO2 SMPL A/ 7IN DISP LF

## (undated) DEVICE — GLV SURG SENSICARE POLYISPRN W/ALOE PF LF 6.5 GRN STRL

## (undated) DEVICE — DRP C/ARM 41X74IN